# Patient Record
Sex: FEMALE | Race: BLACK OR AFRICAN AMERICAN | Employment: UNEMPLOYED | URBAN - METROPOLITAN AREA
[De-identification: names, ages, dates, MRNs, and addresses within clinical notes are randomized per-mention and may not be internally consistent; named-entity substitution may affect disease eponyms.]

---

## 2017-01-01 ENCOUNTER — APPOINTMENT (OUTPATIENT)
Dept: GENERAL RADIOLOGY | Age: 67
DRG: 166 | End: 2017-01-01
Attending: INTERNAL MEDICINE
Payer: MEDICARE

## 2017-01-01 ENCOUNTER — APPOINTMENT (OUTPATIENT)
Dept: GENERAL RADIOLOGY | Age: 67
DRG: 166 | End: 2017-01-01
Attending: HOSPITALIST
Payer: MEDICARE

## 2017-01-01 ENCOUNTER — APPOINTMENT (OUTPATIENT)
Dept: CT IMAGING | Age: 67
DRG: 166 | End: 2017-01-01
Attending: PHYSICIAN ASSISTANT
Payer: MEDICARE

## 2017-01-01 ENCOUNTER — APPOINTMENT (OUTPATIENT)
Dept: CT IMAGING | Age: 67
DRG: 166 | End: 2017-01-01
Attending: INTERNAL MEDICINE
Payer: MEDICARE

## 2017-01-01 ENCOUNTER — APPOINTMENT (OUTPATIENT)
Dept: GENERAL RADIOLOGY | Age: 67
DRG: 166 | End: 2017-01-01
Attending: NURSE PRACTITIONER
Payer: MEDICARE

## 2017-01-01 ENCOUNTER — HOSPITAL ENCOUNTER (INPATIENT)
Age: 67
LOS: 1 days | Discharge: HOME | DRG: 252 | End: 2017-09-22
Attending: EMERGENCY MEDICINE | Admitting: INTERNAL MEDICINE
Payer: MEDICARE

## 2017-01-01 ENCOUNTER — APPOINTMENT (OUTPATIENT)
Dept: GENERAL RADIOLOGY | Age: 67
DRG: 166 | End: 2017-01-01
Attending: PHYSICIAN ASSISTANT
Payer: MEDICARE

## 2017-01-01 ENCOUNTER — HOSPITAL ENCOUNTER (INPATIENT)
Age: 67
LOS: 7 days | DRG: 166 | End: 2017-10-28
Attending: EMERGENCY MEDICINE | Admitting: HOSPITALIST
Payer: MEDICARE

## 2017-01-01 ENCOUNTER — APPOINTMENT (OUTPATIENT)
Dept: CT IMAGING | Age: 67
DRG: 166 | End: 2017-01-01
Attending: EMERGENCY MEDICINE
Payer: MEDICARE

## 2017-01-01 ENCOUNTER — APPOINTMENT (OUTPATIENT)
Dept: GENERAL RADIOLOGY | Age: 67
DRG: 252 | End: 2017-01-01
Attending: SURGERY
Payer: MEDICARE

## 2017-01-01 ENCOUNTER — APPOINTMENT (OUTPATIENT)
Dept: GENERAL RADIOLOGY | Age: 67
DRG: 166 | End: 2017-01-01
Attending: EMERGENCY MEDICINE
Payer: MEDICARE

## 2017-01-01 ENCOUNTER — OFFICE VISIT (OUTPATIENT)
Dept: CARDIOLOGY CLINIC | Age: 67
End: 2017-01-01

## 2017-01-01 ENCOUNTER — ANESTHESIA EVENT (OUTPATIENT)
Dept: CARDIOTHORACIC SURGERY | Age: 67
DRG: 252 | End: 2017-01-01
Payer: MEDICARE

## 2017-01-01 ENCOUNTER — ANESTHESIA (OUTPATIENT)
Dept: CARDIOTHORACIC SURGERY | Age: 67
DRG: 252 | End: 2017-01-01
Payer: MEDICARE

## 2017-01-01 VITALS
WEIGHT: 168 LBS | DIASTOLIC BLOOD PRESSURE: 70 MMHG | HEIGHT: 67 IN | SYSTOLIC BLOOD PRESSURE: 140 MMHG | BODY MASS INDEX: 26.37 KG/M2 | HEART RATE: 82 BPM

## 2017-01-01 VITALS
HEART RATE: 106 BPM | WEIGHT: 182.98 LBS | RESPIRATION RATE: 21 BRPM | BODY MASS INDEX: 30.49 KG/M2 | TEMPERATURE: 99.6 F | DIASTOLIC BLOOD PRESSURE: 63 MMHG | SYSTOLIC BLOOD PRESSURE: 91 MMHG | HEIGHT: 65 IN | OXYGEN SATURATION: 100 %

## 2017-01-01 VITALS
WEIGHT: 176.15 LBS | BODY MASS INDEX: 28.31 KG/M2 | RESPIRATION RATE: 16 BRPM | TEMPERATURE: 97.7 F | OXYGEN SATURATION: 96 % | HEART RATE: 61 BPM | DIASTOLIC BLOOD PRESSURE: 52 MMHG | HEIGHT: 66 IN | SYSTOLIC BLOOD PRESSURE: 103 MMHG

## 2017-01-01 DIAGNOSIS — I11.9 HYPERTENSIVE HEART DISEASE WITHOUT HEART FAILURE: Primary | ICD-10-CM

## 2017-01-01 DIAGNOSIS — I44.30 AV BLOCK: ICD-10-CM

## 2017-01-01 DIAGNOSIS — N18.6: ICD-10-CM

## 2017-01-01 DIAGNOSIS — N18.6 ESRD ON DIALYSIS (HCC): ICD-10-CM

## 2017-01-01 DIAGNOSIS — J90 PLEURAL EFFUSION: ICD-10-CM

## 2017-01-01 DIAGNOSIS — R06.00 DYSPNEA, UNSPECIFIED TYPE: Primary | ICD-10-CM

## 2017-01-01 DIAGNOSIS — Z95.0 CARDIAC PACEMAKER IN SITU: ICD-10-CM

## 2017-01-01 DIAGNOSIS — Z99.2 ESRD ON DIALYSIS (HCC): ICD-10-CM

## 2017-01-01 DIAGNOSIS — I10 ESSENTIAL HYPERTENSION: ICD-10-CM

## 2017-01-01 DIAGNOSIS — T82.49XA CLOTTED DIALYSIS ACCESS, INITIAL ENCOUNTER (HCC): Primary | ICD-10-CM

## 2017-01-01 LAB
ACID FAST STN SPEC: NEGATIVE
ALBUMIN SERPL-MCNC: 2 G/DL (ref 3.4–5)
ALBUMIN SERPL-MCNC: 2.7 G/DL (ref 3.4–5)
ALBUMIN SERPL-MCNC: 3.2 G/DL (ref 3.4–5)
ALBUMIN SERPL-MCNC: 3.2 G/DL (ref 3.4–5)
ALBUMIN SERPL-MCNC: 3.4 G/DL (ref 3.4–5)
ALBUMIN/GLOB SERPL: 0.5 {RATIO} (ref 0.8–1.7)
ALBUMIN/GLOB SERPL: 0.7 {RATIO} (ref 0.8–1.7)
ALBUMIN/GLOB SERPL: 0.7 {RATIO} (ref 0.8–1.7)
ALBUMIN/GLOB SERPL: 0.8 {RATIO} (ref 0.8–1.7)
ALBUMIN/GLOB SERPL: 0.8 {RATIO} (ref 0.8–1.7)
ALP SERPL-CCNC: 142 U/L (ref 45–117)
ALP SERPL-CCNC: 173 U/L (ref 45–117)
ALP SERPL-CCNC: 178 U/L (ref 45–117)
ALP SERPL-CCNC: 206 U/L (ref 45–117)
ALP SERPL-CCNC: 214 U/L (ref 45–117)
ALT SERPL-CCNC: 10 U/L (ref 13–56)
ALT SERPL-CCNC: 11 U/L (ref 13–56)
ALT SERPL-CCNC: 15 U/L (ref 13–56)
ALT SERPL-CCNC: 9 U/L (ref 13–56)
ALT SERPL-CCNC: 9 U/L (ref 13–56)
ANION GAP SERPL CALC-SCNC: 12 MMOL/L (ref 3–18)
ANION GAP SERPL CALC-SCNC: 13 MMOL/L (ref 3–18)
ANION GAP SERPL CALC-SCNC: 5 MMOL/L (ref 3–18)
ANION GAP SERPL CALC-SCNC: 6 MMOL/L (ref 3–18)
ANION GAP SERPL CALC-SCNC: 7 MMOL/L (ref 3–18)
ANION GAP SERPL CALC-SCNC: 7 MMOL/L (ref 3–18)
ANION GAP SERPL CALC-SCNC: 8 MMOL/L (ref 3–18)
ANION GAP SERPL CALC-SCNC: 8 MMOL/L (ref 3–18)
ANION GAP SERPL CALC-SCNC: 9 MMOL/L (ref 3–18)
APPEARANCE FLD: ABNORMAL
APPEARANCE FLD: ABNORMAL
APTT PPP: 33 SEC (ref 23–36.4)
APTT PPP: 36.4 SEC (ref 23–36.4)
ARTERIAL PATENCY WRIST A: ABNORMAL
ARTERIAL PATENCY WRIST A: YES
AST SERPL-CCNC: 11 U/L (ref 15–37)
AST SERPL-CCNC: 16 U/L (ref 15–37)
AST SERPL-CCNC: 20 U/L (ref 15–37)
AST SERPL-CCNC: 7 U/L (ref 15–37)
AST SERPL-CCNC: 7 U/L (ref 15–37)
ATRIAL RATE: 76 BPM
ATRIAL RATE: 87 BPM
BACTERIA SPEC CULT: NORMAL
BASE DEFICIT BLD-SCNC: 10 MMOL/L
BASE DEFICIT BLD-SCNC: 2 MMOL/L
BASE DEFICIT BLD-SCNC: 3 MMOL/L
BASE DEFICIT BLD-SCNC: 4 MMOL/L
BASE DEFICIT BLD-SCNC: 4 MMOL/L
BASE DEFICIT BLD-SCNC: 6 MMOL/L
BASE DEFICIT BLD-SCNC: 6 MMOL/L
BASE DEFICIT BLD-SCNC: 7 MMOL/L
BASE DEFICIT BLD-SCNC: 8 MMOL/L
BASE DEFICIT BLD-SCNC: 9 MMOL/L
BASOPHILS # BLD: 0 K/UL (ref 0–0.06)
BASOPHILS # BLD: 0 K/UL (ref 0–0.1)
BASOPHILS NFR BLD: 0 % (ref 0–2)
BASOPHILS NFR BLD: 0 % (ref 0–3)
BASOPHILS NFR BLD: 0 % (ref 0–3)
BDY SITE: ABNORMAL
BILIRUB DIRECT SERPL-MCNC: 0.1 MG/DL (ref 0–0.2)
BILIRUB SERPL-MCNC: 0.3 MG/DL (ref 0.2–1)
BILIRUB SERPL-MCNC: 0.3 MG/DL (ref 0.2–1)
BILIRUB SERPL-MCNC: 0.4 MG/DL (ref 0.2–1)
BILIRUB SERPL-MCNC: 0.4 MG/DL (ref 0.2–1)
BILIRUB SERPL-MCNC: 0.5 MG/DL (ref 0.2–1)
BODY TEMPERATURE: 37
BODY TEMPERATURE: 98.6
BUN SERPL-MCNC: 16 MG/DL (ref 7–18)
BUN SERPL-MCNC: 22 MG/DL (ref 7–18)
BUN SERPL-MCNC: 25 MG/DL (ref 7–18)
BUN SERPL-MCNC: 26 MG/DL (ref 7–18)
BUN SERPL-MCNC: 30 MG/DL (ref 7–18)
BUN SERPL-MCNC: 39 MG/DL (ref 7–18)
BUN SERPL-MCNC: 40 MG/DL (ref 7–18)
BUN SERPL-MCNC: 43 MG/DL (ref 7–18)
BUN SERPL-MCNC: 70 MG/DL (ref 7–18)
BUN/CREAT SERPL: 5 (ref 12–20)
BUN/CREAT SERPL: 6 (ref 12–20)
BUN/CREAT SERPL: 7 (ref 12–20)
BUN/CREAT SERPL: 7 (ref 12–20)
BUN/CREAT SERPL: 8 (ref 12–20)
BUN/CREAT SERPL: 8 (ref 12–20)
BUN/CREAT SERPL: 9 (ref 12–20)
CALCIUM SERPL-MCNC: 8 MG/DL (ref 8.5–10.1)
CALCIUM SERPL-MCNC: 8 MG/DL (ref 8.5–10.1)
CALCIUM SERPL-MCNC: 8.1 MG/DL (ref 8.5–10.1)
CALCIUM SERPL-MCNC: 8.2 MG/DL (ref 8.5–10.1)
CALCIUM SERPL-MCNC: 8.3 MG/DL (ref 8.5–10.1)
CALCIUM SERPL-MCNC: 8.4 MG/DL (ref 8.5–10.1)
CALCIUM SERPL-MCNC: 8.4 MG/DL (ref 8.5–10.1)
CALCIUM SERPL-MCNC: 8.5 MG/DL (ref 8.5–10.1)
CALCIUM SERPL-MCNC: 8.6 MG/DL (ref 8.5–10.1)
CALCULATED R AXIS, ECG10: -122 DEGREES
CALCULATED R AXIS, ECG10: -86 DEGREES
CALCULATED T AXIS, ECG11: -2 DEGREES
CALCULATED T AXIS, ECG11: 94 DEGREES
CHLORIDE SERPL-SCNC: 101 MMOL/L (ref 100–108)
CHLORIDE SERPL-SCNC: 104 MMOL/L (ref 100–108)
CHLORIDE SERPL-SCNC: 107 MMOL/L (ref 100–108)
CHLORIDE SERPL-SCNC: 108 MMOL/L (ref 100–108)
CHLORIDE SERPL-SCNC: 108 MMOL/L (ref 100–108)
CHLORIDE SERPL-SCNC: 110 MMOL/L (ref 100–108)
CK MB CFR SERPL CALC: 1.8 % (ref 0–4)
CK MB CFR SERPL CALC: 10.4 % (ref 0–4)
CK MB CFR SERPL CALC: 8.3 % (ref 0–4)
CK MB SERPL-MCNC: 2.9 NG/ML (ref 5–25)
CK MB SERPL-MCNC: 3.4 NG/ML (ref 5–25)
CK MB SERPL-MCNC: 9 NG/ML (ref 5–25)
CK SERPL-CCNC: 28 U/L (ref 26–192)
CK SERPL-CCNC: 41 U/L (ref 26–192)
CK SERPL-CCNC: 502 U/L (ref 26–192)
CO2 SERPL-SCNC: 16 MMOL/L (ref 21–32)
CO2 SERPL-SCNC: 18 MMOL/L (ref 21–32)
CO2 SERPL-SCNC: 20 MMOL/L (ref 21–32)
CO2 SERPL-SCNC: 22 MMOL/L (ref 21–32)
CO2 SERPL-SCNC: 22 MMOL/L (ref 21–32)
CO2 SERPL-SCNC: 23 MMOL/L (ref 21–32)
CO2 SERPL-SCNC: 25 MMOL/L (ref 21–32)
CO2 SERPL-SCNC: 25 MMOL/L (ref 21–32)
CO2 SERPL-SCNC: 27 MMOL/L (ref 21–32)
COLOR FLD: ABNORMAL
COLOR FLD: YELLOW
CORTIS SERPL-MCNC: 22.7 UG/DL (ref 3.09–22.4)
CREAT SERPL-MCNC: 2.92 MG/DL (ref 0.6–1.3)
CREAT SERPL-MCNC: 3.76 MG/DL (ref 0.6–1.3)
CREAT SERPL-MCNC: 3.99 MG/DL (ref 0.6–1.3)
CREAT SERPL-MCNC: 4.18 MG/DL (ref 0.6–1.3)
CREAT SERPL-MCNC: 4.71 MG/DL (ref 0.6–1.3)
CREAT SERPL-MCNC: 4.79 MG/DL (ref 0.6–1.3)
CREAT SERPL-MCNC: 5.17 MG/DL (ref 0.6–1.3)
CREAT SERPL-MCNC: 6.06 MG/DL (ref 0.6–1.3)
CREAT SERPL-MCNC: 8.16 MG/DL (ref 0.6–1.3)
D DIMER PPP FEU-MCNC: 8.27 UG/ML(FEU)
DIAGNOSIS, 93000: NORMAL
DIAGNOSIS, 93000: NORMAL
DIFFERENTIAL METHOD BLD: ABNORMAL
EOSINOPHIL # BLD: 0 K/UL (ref 0–0.4)
EOSINOPHIL # BLD: 0.1 K/UL (ref 0–0.4)
EOSINOPHIL # BLD: 0.2 K/UL (ref 0–0.4)
EOSINOPHIL NFR BLD: 1 % (ref 0–5)
EOSINOPHIL NFR BLD: 2 % (ref 0–5)
EOSINOPHIL NFR FLD MANUAL: 0 %
EOSINOPHIL NFR FLD MANUAL: 0 %
ERYTHROCYTE [DISTWIDTH] IN BLOOD BY AUTOMATED COUNT: 13.5 % (ref 11.6–14.5)
ERYTHROCYTE [DISTWIDTH] IN BLOOD BY AUTOMATED COUNT: 13.8 % (ref 11.6–14.5)
ERYTHROCYTE [DISTWIDTH] IN BLOOD BY AUTOMATED COUNT: 13.8 % (ref 11.6–14.5)
ERYTHROCYTE [DISTWIDTH] IN BLOOD BY AUTOMATED COUNT: 13.9 % (ref 11.6–14.5)
ERYTHROCYTE [DISTWIDTH] IN BLOOD BY AUTOMATED COUNT: 13.9 % (ref 11.6–14.5)
ERYTHROCYTE [DISTWIDTH] IN BLOOD BY AUTOMATED COUNT: 14.2 % (ref 11.6–14.5)
ERYTHROCYTE [DISTWIDTH] IN BLOOD BY AUTOMATED COUNT: 15 % (ref 11.6–14.5)
ERYTHROCYTE [DISTWIDTH] IN BLOOD BY AUTOMATED COUNT: 16.5 % (ref 11.6–14.5)
ERYTHROCYTE [DISTWIDTH] IN BLOOD BY AUTOMATED COUNT: 16.6 % (ref 11.6–14.5)
GAS FLOW.O2 O2 DELIVERY SYS: ABNORMAL L/MIN
GAS FLOW.O2 SETTING OXYMISER: 15 BPM
GAS FLOW.O2 SETTING OXYMISER: 2.5 L/M
GAS FLOW.O2 SETTING OXYMISER: 20 BPM
GAS FLOW.O2 SETTING OXYMISER: 20 BPM
GAS FLOW.O2 SETTING OXYMISER: 22 BPM
GAS FLOW.O2 SETTING OXYMISER: 26 BPM
GAS FLOW.O2 SETTING OXYMISER: 26 BPM
GLOBULIN SER CALC-MCNC: 3.9 G/DL (ref 2–4)
GLOBULIN SER CALC-MCNC: 3.9 G/DL (ref 2–4)
GLOBULIN SER CALC-MCNC: 4 G/DL (ref 2–4)
GLOBULIN SER CALC-MCNC: 4.2 G/DL (ref 2–4)
GLOBULIN SER CALC-MCNC: 4.6 G/DL (ref 2–4)
GLUCOSE BLD STRIP.AUTO-MCNC: 100 MG/DL (ref 70–110)
GLUCOSE BLD STRIP.AUTO-MCNC: 102 MG/DL (ref 70–110)
GLUCOSE BLD STRIP.AUTO-MCNC: 103 MG/DL (ref 70–110)
GLUCOSE BLD STRIP.AUTO-MCNC: 55 MG/DL (ref 70–110)
GLUCOSE BLD STRIP.AUTO-MCNC: 61 MG/DL (ref 70–110)
GLUCOSE BLD STRIP.AUTO-MCNC: 69 MG/DL (ref 70–110)
GLUCOSE BLD STRIP.AUTO-MCNC: 76 MG/DL (ref 70–110)
GLUCOSE BLD STRIP.AUTO-MCNC: 81 MG/DL (ref 70–110)
GLUCOSE BLD STRIP.AUTO-MCNC: 82 MG/DL (ref 70–110)
GLUCOSE BLD STRIP.AUTO-MCNC: 84 MG/DL (ref 70–110)
GLUCOSE BLD STRIP.AUTO-MCNC: 85 MG/DL (ref 70–110)
GLUCOSE BLD STRIP.AUTO-MCNC: 86 MG/DL (ref 70–110)
GLUCOSE BLD STRIP.AUTO-MCNC: 86 MG/DL (ref 70–110)
GLUCOSE BLD STRIP.AUTO-MCNC: 87 MG/DL (ref 70–110)
GLUCOSE BLD STRIP.AUTO-MCNC: 87 MG/DL (ref 70–110)
GLUCOSE BLD STRIP.AUTO-MCNC: 88 MG/DL (ref 70–110)
GLUCOSE BLD STRIP.AUTO-MCNC: 88 MG/DL (ref 70–110)
GLUCOSE BLD STRIP.AUTO-MCNC: 90 MG/DL (ref 70–110)
GLUCOSE BLD STRIP.AUTO-MCNC: 90 MG/DL (ref 70–110)
GLUCOSE BLD STRIP.AUTO-MCNC: 91 MG/DL (ref 70–110)
GLUCOSE BLD STRIP.AUTO-MCNC: 91 MG/DL (ref 70–110)
GLUCOSE BLD STRIP.AUTO-MCNC: 92 MG/DL (ref 70–110)
GLUCOSE BLD STRIP.AUTO-MCNC: 93 MG/DL (ref 70–110)
GLUCOSE BLD STRIP.AUTO-MCNC: 93 MG/DL (ref 70–110)
GLUCOSE FLD-MCNC: 78 MG/DL
GLUCOSE FLD-MCNC: 89 MG/DL
GLUCOSE SERPL-MCNC: 107 MG/DL (ref 74–99)
GLUCOSE SERPL-MCNC: 117 MG/DL (ref 74–99)
GLUCOSE SERPL-MCNC: 73 MG/DL (ref 74–99)
GLUCOSE SERPL-MCNC: 75 MG/DL (ref 74–99)
GLUCOSE SERPL-MCNC: 76 MG/DL (ref 74–99)
GLUCOSE SERPL-MCNC: 82 MG/DL (ref 74–99)
GLUCOSE SERPL-MCNC: 84 MG/DL (ref 74–99)
GLUCOSE SERPL-MCNC: 89 MG/DL (ref 74–99)
GLUCOSE SERPL-MCNC: 90 MG/DL (ref 74–99)
GRAM STN SPEC: NORMAL
HBV SURFACE AB SER QL IA: NEGATIVE
HBV SURFACE AB SER QL IA: NEGATIVE
HBV SURFACE AB SERPL IA-ACNC: <3.1 MIU/ML
HBV SURFACE AB SERPL IA-ACNC: <3.1 MIU/ML
HBV SURFACE AG SER QL: <0.1 INDEX
HBV SURFACE AG SER QL: <0.1 INDEX
HBV SURFACE AG SER QL: NEGATIVE
HBV SURFACE AG SER QL: NEGATIVE
HCO3 BLD-SCNC: 17.6 MMOL/L (ref 22–26)
HCO3 BLD-SCNC: 17.9 MMOL/L (ref 22–26)
HCO3 BLD-SCNC: 18 MMOL/L (ref 22–26)
HCO3 BLD-SCNC: 18.9 MMOL/L (ref 22–26)
HCO3 BLD-SCNC: 21.4 MMOL/L (ref 22–26)
HCO3 BLD-SCNC: 21.4 MMOL/L (ref 22–26)
HCO3 BLD-SCNC: 21.7 MMOL/L (ref 22–26)
HCO3 BLD-SCNC: 22.7 MMOL/L (ref 22–26)
HCO3 BLD-SCNC: 23.9 MMOL/L (ref 22–26)
HCO3 BLD-SCNC: 24.4 MMOL/L (ref 22–26)
HCO3 BLD-SCNC: 24.8 MMOL/L (ref 22–26)
HCO3 BLD-SCNC: 26 MMOL/L (ref 22–26)
HCO3 BLD-SCNC: 26.7 MMOL/L (ref 22–26)
HCO3 BLD-SCNC: 27 MMOL/L (ref 22–26)
HCT VFR BLD AUTO: 26.8 % (ref 35–45)
HCT VFR BLD AUTO: 27.1 % (ref 35–45)
HCT VFR BLD AUTO: 27.3 % (ref 35–45)
HCT VFR BLD AUTO: 28.2 % (ref 35–45)
HCT VFR BLD AUTO: 29.8 % (ref 35–45)
HCT VFR BLD AUTO: 32.3 % (ref 35–45)
HCT VFR BLD AUTO: 35.1 % (ref 35–45)
HCT VFR BLD AUTO: 35.7 % (ref 35–45)
HCT VFR BLD AUTO: 35.8 % (ref 35–45)
HEP BS AB COMMENT,HBSAC: ABNORMAL
HEP BS AB COMMENT,HBSAC: ABNORMAL
HGB BLD-MCNC: 10.4 G/DL (ref 12–16)
HGB BLD-MCNC: 10.5 G/DL (ref 12–16)
HGB BLD-MCNC: 11 G/DL (ref 12–16)
HGB BLD-MCNC: 11.1 G/DL (ref 12–16)
HGB BLD-MCNC: 8.8 G/DL (ref 12–16)
HGB BLD-MCNC: 8.9 G/DL (ref 12–16)
HGB BLD-MCNC: 9.3 G/DL (ref 12–16)
HGB BLD-MCNC: 9.3 G/DL (ref 12–16)
HGB BLD-MCNC: 9.7 G/DL (ref 12–16)
INR PPP: 1 (ref 0.8–1.2)
INR PPP: 1.1 (ref 0.8–1.2)
INR PPP: 1.1 (ref 0.8–1.2)
INSPIRATION.DURATION SETTING TIME VENT: 0.9 SEC
INSPIRATION.DURATION SETTING TIME VENT: 1.25 SEC
LACTATE BLD-SCNC: 0.5 MMOL/L (ref 0.4–2)
LDH FLD L TO P-CCNC: 265 U/L
LDH FLD L TO P-CCNC: 69 U/L
LDH SERPL L TO P-CCNC: 260 U/L (ref 81–234)
LYMPHOCYTES # BLD: 0.2 K/UL (ref 0.8–3.5)
LYMPHOCYTES # BLD: 0.5 K/UL (ref 0.8–3.5)
LYMPHOCYTES # BLD: 0.5 K/UL (ref 0.9–3.6)
LYMPHOCYTES # BLD: 0.6 K/UL (ref 0.9–3.6)
LYMPHOCYTES # BLD: 0.8 K/UL (ref 0.9–3.6)
LYMPHOCYTES # BLD: 0.8 K/UL (ref 0.9–3.6)
LYMPHOCYTES # BLD: 0.9 K/UL (ref 0.9–3.6)
LYMPHOCYTES # BLD: 1 K/UL (ref 0.9–3.6)
LYMPHOCYTES # BLD: 1.1 K/UL (ref 0.9–3.6)
LYMPHOCYTES NFR BLD: 10 % (ref 21–52)
LYMPHOCYTES NFR BLD: 12 % (ref 21–52)
LYMPHOCYTES NFR BLD: 15 % (ref 21–52)
LYMPHOCYTES NFR BLD: 16 % (ref 21–52)
LYMPHOCYTES NFR BLD: 18 % (ref 21–52)
LYMPHOCYTES NFR BLD: 2 % (ref 20–51)
LYMPHOCYTES NFR BLD: 6 % (ref 20–51)
LYMPHOCYTES NFR BLD: 6 % (ref 21–52)
LYMPHOCYTES NFR BLD: 6 % (ref 21–52)
LYMPHOCYTES NFR FLD: 73 %
LYMPHOCYTES NFR FLD: 80 %
MACROPHAGES NFR FLD: 18 %
MAGNESIUM SERPL-MCNC: 2 MG/DL (ref 1.6–2.6)
MAGNESIUM SERPL-MCNC: 2.1 MG/DL (ref 1.6–2.6)
MAGNESIUM SERPL-MCNC: 2.1 MG/DL (ref 1.6–2.6)
MCH RBC QN AUTO: 32.7 PG (ref 24–34)
MCH RBC QN AUTO: 32.8 PG (ref 24–34)
MCH RBC QN AUTO: 33 PG (ref 24–34)
MCH RBC QN AUTO: 33 PG (ref 24–34)
MCH RBC QN AUTO: 33.2 PG (ref 24–34)
MCH RBC QN AUTO: 33.3 PG (ref 24–34)
MCH RBC QN AUTO: 33.5 PG (ref 24–34)
MCH RBC QN AUTO: 33.8 PG (ref 24–34)
MCH RBC QN AUTO: 34 PG (ref 24–34)
MCHC RBC AUTO-ENTMCNC: 29.1 G/DL (ref 31–37)
MCHC RBC AUTO-ENTMCNC: 31.1 G/DL (ref 31–37)
MCHC RBC AUTO-ENTMCNC: 31.3 G/DL (ref 31–37)
MCHC RBC AUTO-ENTMCNC: 32.5 G/DL (ref 31–37)
MCHC RBC AUTO-ENTMCNC: 32.5 G/DL (ref 31–37)
MCHC RBC AUTO-ENTMCNC: 32.6 G/DL (ref 31–37)
MCHC RBC AUTO-ENTMCNC: 33 G/DL (ref 31–37)
MCHC RBC AUTO-ENTMCNC: 33.2 G/DL (ref 31–37)
MCHC RBC AUTO-ENTMCNC: 34.1 G/DL (ref 31–37)
MCV RBC AUTO: 100 FL (ref 74–97)
MCV RBC AUTO: 100.3 FL (ref 74–97)
MCV RBC AUTO: 101.1 FL (ref 74–97)
MCV RBC AUTO: 103.9 FL (ref 74–97)
MCV RBC AUTO: 106.9 FL (ref 74–97)
MCV RBC AUTO: 108.3 FL (ref 74–97)
MCV RBC AUTO: 114.7 FL (ref 74–97)
MCV RBC AUTO: 98.2 FL (ref 74–97)
MCV RBC AUTO: 99.3 FL (ref 74–97)
MONOCYTES # BLD: 0.3 K/UL (ref 0.05–1.2)
MONOCYTES # BLD: 0.4 K/UL (ref 0.05–1.2)
MONOCYTES # BLD: 0.4 K/UL (ref 0.05–1.2)
MONOCYTES # BLD: 0.6 K/UL (ref 0.05–1.2)
MONOCYTES # BLD: 0.6 K/UL (ref 0–1)
MONOCYTES # BLD: 0.7 K/UL (ref 0–1)
MONOCYTES # BLD: 0.8 K/UL (ref 0.05–1.2)
MONOCYTES # BLD: 0.9 K/UL (ref 0.05–1.2)
MONOCYTES # BLD: 1 K/UL (ref 0.05–1.2)
MONOCYTES NFR BLD: 10 % (ref 3–10)
MONOCYTES NFR BLD: 13 % (ref 3–10)
MONOCYTES NFR BLD: 6 % (ref 3–10)
MONOCYTES NFR BLD: 7 % (ref 2–9)
MONOCYTES NFR BLD: 7 % (ref 2–9)
MONOCYTES NFR BLD: 7 % (ref 3–10)
MONOCYTES NFR BLD: 8 % (ref 3–10)
MONOCYTES NFR FLD: 0 %
MONOCYTES NFR FLD: 6 %
NEUTROPHILS NFR FLD: 14 %
NEUTROPHILS NFR FLD: 9 %
NEUTS BAND # FLD: 0 %
NEUTS BAND # FLD: 0 %
NEUTS BAND NFR BLD MANUAL: 5 % (ref 0–5)
NEUTS SEG # BLD: 3.7 K/UL (ref 1.8–8)
NEUTS SEG # BLD: 3.9 K/UL (ref 1.8–8)
NEUTS SEG # BLD: 5.3 K/UL (ref 1.8–8)
NEUTS SEG # BLD: 6.1 K/UL (ref 1.8–8)
NEUTS SEG # BLD: 6.4 K/UL (ref 1.8–8)
NEUTS SEG # BLD: 7 K/UL (ref 1.8–8)
NEUTS SEG # BLD: 7 K/UL (ref 1.8–8)
NEUTS SEG # BLD: 7.7 K/UL (ref 1.8–8)
NEUTS SEG # BLD: 9.3 K/UL (ref 1.8–8)
NEUTS SEG NFR BLD: 73 % (ref 40–73)
NEUTS SEG NFR BLD: 75 % (ref 40–73)
NEUTS SEG NFR BLD: 76 % (ref 40–73)
NEUTS SEG NFR BLD: 76 % (ref 40–73)
NEUTS SEG NFR BLD: 77 % (ref 40–73)
NEUTS SEG NFR BLD: 84 % (ref 42–75)
NEUTS SEG NFR BLD: 85 % (ref 40–73)
NEUTS SEG NFR BLD: 86 % (ref 40–73)
NEUTS SEG NFR BLD: 86 % (ref 42–75)
NUC CELL # FLD: 147 /CU MM
NUC CELL # FLD: 213 /CU MM
O2/TOTAL GAS SETTING VFR VENT: 0.3 %
O2/TOTAL GAS SETTING VFR VENT: 0.4 %
O2/TOTAL GAS SETTING VFR VENT: 100 %
O2/TOTAL GAS SETTING VFR VENT: 30 %
O2/TOTAL GAS SETTING VFR VENT: 30 %
O2/TOTAL GAS SETTING VFR VENT: 35 %
O2/TOTAL GAS SETTING VFR VENT: 40 %
O2/TOTAL GAS SETTING VFR VENT: 40 %
O2/TOTAL GAS SETTING VFR VENT: 50 %
O2/TOTAL GAS SETTING VFR VENT: 50 %
O2/TOTAL GAS SETTING VFR VENT: 60 %
P-R INTERVAL, ECG05: 64 MS
P-R INTERVAL, ECG05: 82 MS
PCO2 BLD: 100.2 MMHG (ref 35–45)
PCO2 BLD: 100.5 MMHG (ref 35–45)
PCO2 BLD: 116.1 MMHG (ref 35–45)
PCO2 BLD: 30.2 MMHG (ref 35–45)
PCO2 BLD: 34.2 MMHG (ref 35–45)
PCO2 BLD: 34.3 MMHG (ref 35–45)
PCO2 BLD: 35.7 MMHG (ref 35–45)
PCO2 BLD: 37.4 MMHG (ref 35–45)
PCO2 BLD: 38.2 MMHG (ref 35–45)
PCO2 BLD: 38.6 MMHG (ref 35–45)
PCO2 BLD: 45.3 MMHG (ref 35–45)
PCO2 BLD: 81.9 MMHG (ref 35–45)
PCO2 BLD: 84 MMHG (ref 35–45)
PCO2 BLD: 98.7 MMHG (ref 35–45)
PEEP RESPIRATORY: 10 CMH2O
PEEP RESPIRATORY: 5 CMH2O
PH BLD: 6.94 [PH] (ref 7.35–7.45)
PH BLD: 7.03 [PH] (ref 7.35–7.45)
PH BLD: 7.03 [PH] (ref 7.35–7.45)
PH BLD: 7.04 [PH] (ref 7.35–7.45)
PH BLD: 7.07 [PH] (ref 7.35–7.45)
PH BLD: 7.07 [PH] (ref 7.35–7.45)
PH BLD: 7.23 [PH] (ref 7.35–7.45)
PH BLD: 7.31 [PH] (ref 7.35–7.45)
PH BLD: 7.33 [PH] (ref 7.35–7.45)
PH BLD: 7.36 [PH] (ref 7.35–7.45)
PH BLD: 7.37 [PH] (ref 7.35–7.45)
PH BLD: 7.37 [PH] (ref 7.35–7.45)
PH BLD: 7.38 [PH] (ref 7.35–7.45)
PH BLD: 7.41 [PH] (ref 7.35–7.45)
PHOSPHATE SERPL-MCNC: 1.6 MG/DL (ref 2.5–4.9)
PHOSPHATE SERPL-MCNC: 1.7 MG/DL (ref 2.5–4.9)
PHOSPHATE SERPL-MCNC: 2.2 MG/DL (ref 2.5–4.9)
PHOSPHATE SERPL-MCNC: 2.4 MG/DL (ref 2.5–4.9)
PIP ISTAT,IPIP: 16
PIP ISTAT,IPIP: 16
PIP ISTAT,IPIP: 20
PIP ISTAT,IPIP: 22
PIP ISTAT,IPIP: 22
PIP ISTAT,IPIP: 30
PLATELET # BLD AUTO: 105 K/UL (ref 135–420)
PLATELET # BLD AUTO: 131 K/UL (ref 135–420)
PLATELET # BLD AUTO: 145 K/UL (ref 135–420)
PLATELET # BLD AUTO: 151 K/UL (ref 135–420)
PLATELET # BLD AUTO: 158 K/UL (ref 135–420)
PLATELET # BLD AUTO: 162 K/UL (ref 135–420)
PLATELET # BLD AUTO: 164 K/UL (ref 135–420)
PLATELET # BLD AUTO: 169 K/UL (ref 135–420)
PLATELET # BLD AUTO: 183 K/UL (ref 135–420)
PLATELET COMMENTS,PCOM: ABNORMAL
PLATELET COMMENTS,PCOM: ABNORMAL
PMV BLD AUTO: 10.2 FL (ref 9.2–11.8)
PMV BLD AUTO: 10.4 FL (ref 9.2–11.8)
PMV BLD AUTO: 10.4 FL (ref 9.2–11.8)
PMV BLD AUTO: 10.6 FL (ref 9.2–11.8)
PMV BLD AUTO: 10.7 FL (ref 9.2–11.8)
PMV BLD AUTO: 10.9 FL (ref 9.2–11.8)
PMV BLD AUTO: 12 FL (ref 9.2–11.8)
PMV BLD AUTO: 9.7 FL (ref 9.2–11.8)
PMV BLD AUTO: 9.9 FL (ref 9.2–11.8)
PO2 BLD: 109 MMHG (ref 80–100)
PO2 BLD: 115 MMHG (ref 80–100)
PO2 BLD: 116 MMHG (ref 80–100)
PO2 BLD: 116 MMHG (ref 80–100)
PO2 BLD: 120 MMHG (ref 80–100)
PO2 BLD: 126 MMHG (ref 80–100)
PO2 BLD: 142 MMHG (ref 80–100)
PO2 BLD: 172 MMHG (ref 80–100)
PO2 BLD: 205 MMHG (ref 80–100)
PO2 BLD: 316 MMHG (ref 80–100)
PO2 BLD: 74 MMHG (ref 80–100)
PO2 BLD: 75 MMHG (ref 80–100)
PO2 BLD: 79 MMHG (ref 80–100)
PO2 BLD: 97 MMHG (ref 80–100)
POTASSIUM SERPL-SCNC: 2.7 MMOL/L (ref 3.5–5.5)
POTASSIUM SERPL-SCNC: 3.2 MMOL/L (ref 3.5–5.5)
POTASSIUM SERPL-SCNC: 3.3 MMOL/L (ref 3.5–5.5)
POTASSIUM SERPL-SCNC: 3.6 MMOL/L (ref 3.5–5.5)
POTASSIUM SERPL-SCNC: 3.6 MMOL/L (ref 3.5–5.5)
POTASSIUM SERPL-SCNC: 3.7 MMOL/L (ref 3.5–5.5)
POTASSIUM SERPL-SCNC: 3.7 MMOL/L (ref 3.5–5.5)
POTASSIUM SERPL-SCNC: 3.8 MMOL/L (ref 3.5–5.5)
POTASSIUM SERPL-SCNC: 4.5 MMOL/L (ref 3.5–5.5)
PROT FLD-MCNC: 4 G/DL
PROT FLD-MCNC: 4.7 G/DL
PROT SERPL-MCNC: 5.9 G/DL (ref 6.4–8.2)
PROT SERPL-MCNC: 6.6 G/DL (ref 6.4–8.2)
PROT SERPL-MCNC: 7.2 G/DL (ref 6.4–8.2)
PROT SERPL-MCNC: 7.4 G/DL (ref 6.4–8.2)
PROT SERPL-MCNC: 8 G/DL (ref 6.4–8.2)
PROTHROMBIN TIME: 13.1 SEC (ref 11.5–15.2)
PROTHROMBIN TIME: 13.7 SEC (ref 11.5–15.2)
PROTHROMBIN TIME: 13.9 SEC (ref 11.5–15.2)
Q-T INTERVAL, ECG07: 440 MS
Q-T INTERVAL, ECG07: 480 MS
QRS DURATION, ECG06: 140 MS
QRS DURATION, ECG06: 162 MS
QTC CALCULATION (BEZET), ECG08: 484 MS
QTC CALCULATION (BEZET), ECG08: 540 MS
RBC # BLD AUTO: 2.68 M/UL (ref 4.2–5.3)
RBC # BLD AUTO: 2.7 M/UL (ref 4.2–5.3)
RBC # BLD AUTO: 2.78 M/UL (ref 4.2–5.3)
RBC # BLD AUTO: 2.82 M/UL (ref 4.2–5.3)
RBC # BLD AUTO: 2.97 M/UL (ref 4.2–5.3)
RBC # BLD AUTO: 3.11 M/UL (ref 4.2–5.3)
RBC # BLD AUTO: 3.12 M/UL (ref 4.2–5.3)
RBC # BLD AUTO: 3.24 M/UL (ref 4.2–5.3)
RBC # BLD AUTO: 3.34 M/UL (ref 4.2–5.3)
RBC # FLD: 8738 /CU MM
RBC # FLD: ABNORMAL /CU MM
RBC MORPH BLD: ABNORMAL
SAO2 % BLD: 100 % (ref 92–97)
SAO2 % BLD: 100 % (ref 92–97)
SAO2 % BLD: 84 % (ref 92–97)
SAO2 % BLD: 85 % (ref 92–97)
SAO2 % BLD: 86 % (ref 92–97)
SAO2 % BLD: 95 % (ref 92–97)
SAO2 % BLD: 95 % (ref 92–97)
SAO2 % BLD: 96 % (ref 92–97)
SAO2 % BLD: 97 % (ref 92–97)
SAO2 % BLD: 98 % (ref 92–97)
SAO2 % BLD: 98 % (ref 92–97)
SAO2 % BLD: 99 % (ref 92–97)
SERVICE CMNT-IMP: ABNORMAL
SERVICE CMNT-IMP: NORMAL
SODIUM SERPL-SCNC: 133 MMOL/L (ref 136–145)
SODIUM SERPL-SCNC: 135 MMOL/L (ref 136–145)
SODIUM SERPL-SCNC: 136 MMOL/L (ref 136–145)
SODIUM SERPL-SCNC: 136 MMOL/L (ref 136–145)
SODIUM SERPL-SCNC: 137 MMOL/L (ref 136–145)
SODIUM SERPL-SCNC: 140 MMOL/L (ref 136–145)
SODIUM SERPL-SCNC: 141 MMOL/L (ref 136–145)
SPECIMEN PREPARATION: NORMAL
SPECIMEN SOURCE FLD: ABNORMAL
SPECIMEN SOURCE FLD: ABNORMAL
SPECIMEN SOURCE FLD: NORMAL
SPECIMEN SOURCE: NORMAL
SPECIMEN TYPE: ABNORMAL
SPONTANEOUS TIMED, IST: YES
TOTAL RESP. RATE, ITRR: 12
TOTAL RESP. RATE, ITRR: 14
TOTAL RESP. RATE, ITRR: 15
TOTAL RESP. RATE, ITRR: 16
TOTAL RESP. RATE, ITRR: 16
TOTAL RESP. RATE, ITRR: 18
TOTAL RESP. RATE, ITRR: 20
TOTAL RESP. RATE, ITRR: 20
TOTAL RESP. RATE, ITRR: 22
TOTAL RESP. RATE, ITRR: 23
TOTAL RESP. RATE, ITRR: 24
TOTAL RESP. RATE, ITRR: 26
TOTAL RESP. RATE, ITRR: 26
TOTAL RESP. RATE, ITRR: 41
TROPONIN I SERPL-MCNC: 0.1 NG/ML (ref 0–0.04)
TROPONIN I SERPL-MCNC: 0.11 NG/ML (ref 0–0.04)
TROPONIN I SERPL-MCNC: 0.12 NG/ML (ref 0–0.04)
VANCOMYCIN SERPL-MCNC: 21.3 UG/ML (ref 5–40)
VANCOMYCIN SERPL-MCNC: 22.2 UG/ML (ref 5–40)
VANCOMYCIN SERPL-MCNC: 23.5 UG/ML (ref 5–40)
VENTILATION MODE VENT: ABNORMAL
VENTRICULAR RATE, ECG03: 73 BPM
VENTRICULAR RATE, ECG03: 76 BPM
VOLUME CONTROL IVLC: YES
VOLUME CONTROL IVLC: YES
VOLUME CONTROL PLUS IVLCP: YES
VT SETTING VENT: 350 ML
VT SETTING VENT: 375 ML
WBC # BLD AUTO: 10.4 K/UL (ref 4.6–13.2)
WBC # BLD AUTO: 5.1 K/UL (ref 4.6–13.2)
WBC # BLD AUTO: 5.1 K/UL (ref 4.6–13.2)
WBC # BLD AUTO: 6.9 K/UL (ref 4.6–13.2)
WBC # BLD AUTO: 8.1 K/UL (ref 4.6–13.2)
WBC # BLD AUTO: 8.2 K/UL (ref 4.6–13.2)
WBC # BLD AUTO: 8.2 K/UL (ref 4.6–13.2)
WBC # BLD AUTO: 8.4 K/UL (ref 4.6–13.2)
WBC # BLD AUTO: 9.1 K/UL (ref 4.6–13.2)
WBC MORPH BLD: ABNORMAL

## 2017-01-01 PROCEDURE — 74011250636 HC RX REV CODE- 250/636: Performed by: EMERGENCY MEDICINE

## 2017-01-01 PROCEDURE — 71010 XR CHEST PORT: CPT

## 2017-01-01 PROCEDURE — 99284 EMERGENCY DEPT VISIT MOD MDM: CPT

## 2017-01-01 PROCEDURE — 87340 HEPATITIS B SURFACE AG IA: CPT | Performed by: INTERNAL MEDICINE

## 2017-01-01 PROCEDURE — 74011250637 HC RX REV CODE- 250/637: Performed by: INTERNAL MEDICINE

## 2017-01-01 PROCEDURE — 74011000258 HC RX REV CODE- 258: Performed by: INTERNAL MEDICINE

## 2017-01-01 PROCEDURE — 74011000250 HC RX REV CODE- 250: Performed by: PHYSICIAN ASSISTANT

## 2017-01-01 PROCEDURE — 82550 ASSAY OF CK (CPK): CPT | Performed by: HOSPITALIST

## 2017-01-01 PROCEDURE — 74011000258 HC RX REV CODE- 258: Performed by: PHYSICIAN ASSISTANT

## 2017-01-01 PROCEDURE — 88305 TISSUE EXAM BY PATHOLOGIST: CPT | Performed by: INTERNAL MEDICINE

## 2017-01-01 PROCEDURE — 84100 ASSAY OF PHOSPHORUS: CPT | Performed by: INTERNAL MEDICINE

## 2017-01-01 PROCEDURE — 74011250636 HC RX REV CODE- 250/636

## 2017-01-01 PROCEDURE — 74011000258 HC RX REV CODE- 258: Performed by: HOSPITALIST

## 2017-01-01 PROCEDURE — 82803 BLOOD GASES ANY COMBINATION: CPT

## 2017-01-01 PROCEDURE — 74011250636 HC RX REV CODE- 250/636: Performed by: INTERNAL MEDICINE

## 2017-01-01 PROCEDURE — 0BH17EZ INSERTION OF ENDOTRACHEAL AIRWAY INTO TRACHEA, VIA NATURAL OR ARTIFICIAL OPENING: ICD-10-PCS | Performed by: ANESTHESIOLOGY

## 2017-01-01 PROCEDURE — 83735 ASSAY OF MAGNESIUM: CPT

## 2017-01-01 PROCEDURE — 86431 RHEUMATOID FACTOR QUANT: CPT | Performed by: INTERNAL MEDICINE

## 2017-01-01 PROCEDURE — 80202 ASSAY OF VANCOMYCIN: CPT | Performed by: HOSPITALIST

## 2017-01-01 PROCEDURE — 93005 ELECTROCARDIOGRAM TRACING: CPT

## 2017-01-01 PROCEDURE — 94660 CPAP INITIATION&MGMT: CPT

## 2017-01-01 PROCEDURE — 86706 HEP B SURFACE ANTIBODY: CPT | Performed by: INTERNAL MEDICINE

## 2017-01-01 PROCEDURE — 94640 AIRWAY INHALATION TREATMENT: CPT

## 2017-01-01 PROCEDURE — 74011250636 HC RX REV CODE- 250/636: Performed by: NURSE PRACTITIONER

## 2017-01-01 PROCEDURE — 83615 LACTATE (LD) (LDH) ENZYME: CPT | Performed by: INTERNAL MEDICINE

## 2017-01-01 PROCEDURE — 99218 HC RM OBSERVATION: CPT

## 2017-01-01 PROCEDURE — 65270000029 HC RM PRIVATE

## 2017-01-01 PROCEDURE — 85025 COMPLETE CBC W/AUTO DIFF WBC: CPT | Performed by: NURSE PRACTITIONER

## 2017-01-01 PROCEDURE — 85025 COMPLETE CBC W/AUTO DIFF WBC: CPT | Performed by: INTERNAL MEDICINE

## 2017-01-01 PROCEDURE — 74011000258 HC RX REV CODE- 258: Performed by: SURGERY

## 2017-01-01 PROCEDURE — 36600 WITHDRAWAL OF ARTERIAL BLOOD: CPT

## 2017-01-01 PROCEDURE — 85610 PROTHROMBIN TIME: CPT | Performed by: EMERGENCY MEDICINE

## 2017-01-01 PROCEDURE — 74011000258 HC RX REV CODE- 258

## 2017-01-01 PROCEDURE — 82945 GLUCOSE OTHER FLUID: CPT | Performed by: INTERNAL MEDICINE

## 2017-01-01 PROCEDURE — 74011250636 HC RX REV CODE- 250/636: Performed by: HOSPITALIST

## 2017-01-01 PROCEDURE — 74011250637 HC RX REV CODE- 250/637: Performed by: NURSE PRACTITIONER

## 2017-01-01 PROCEDURE — 87206 SMEAR FLUORESCENT/ACID STAI: CPT | Performed by: INTERNAL MEDICINE

## 2017-01-01 PROCEDURE — 65610000006 HC RM INTENSIVE CARE

## 2017-01-01 PROCEDURE — 74011250636 HC RX REV CODE- 250/636: Performed by: PHYSICIAN ASSISTANT

## 2017-01-01 PROCEDURE — 5A1D00Z PERFORMANCE OF URINARY FILTRATION, SINGLE: ICD-10-PCS | Performed by: INTERNAL MEDICINE

## 2017-01-01 PROCEDURE — 74011250636 HC RX REV CODE- 250/636: Performed by: SURGERY

## 2017-01-01 PROCEDURE — 85025 COMPLETE CBC W/AUTO DIFF WBC: CPT | Performed by: EMERGENCY MEDICINE

## 2017-01-01 PROCEDURE — 51702 INSERT TEMP BLADDER CATH: CPT

## 2017-01-01 PROCEDURE — 77030002933 HC SUT MCRYL J&J -A: Performed by: SURGERY

## 2017-01-01 PROCEDURE — 77030018846 HC SOL IRR STRL H20 ICUM -A

## 2017-01-01 PROCEDURE — 87102 FUNGUS ISOLATION CULTURE: CPT | Performed by: INTERNAL MEDICINE

## 2017-01-01 PROCEDURE — 77030002996 HC SUT SLK J&J -A: Performed by: SURGERY

## 2017-01-01 PROCEDURE — 77030012390 HC DRN CHST BTL GTNG -B

## 2017-01-01 PROCEDURE — 80048 BASIC METABOLIC PNL TOTAL CA: CPT | Performed by: INTERNAL MEDICINE

## 2017-01-01 PROCEDURE — 77030018836 HC SOL IRR NACL ICUM -A: Performed by: SURGERY

## 2017-01-01 PROCEDURE — 83605 ASSAY OF LACTIC ACID: CPT

## 2017-01-01 PROCEDURE — 85730 THROMBOPLASTIN TIME PARTIAL: CPT | Performed by: EMERGENCY MEDICINE

## 2017-01-01 PROCEDURE — 85025 COMPLETE CBC W/AUTO DIFF WBC: CPT | Performed by: PHYSICIAN ASSISTANT

## 2017-01-01 PROCEDURE — 96374 THER/PROPH/DIAG INJ IV PUSH: CPT

## 2017-01-01 PROCEDURE — 74011000250 HC RX REV CODE- 250: Performed by: SURGERY

## 2017-01-01 PROCEDURE — 87070 CULTURE OTHR SPECIMN AEROBIC: CPT | Performed by: INTERNAL MEDICINE

## 2017-01-01 PROCEDURE — 71010 XR CHEST SNGL V: CPT

## 2017-01-01 PROCEDURE — 03783ZZ DILATION OF LEFT BRACHIAL ARTERY, PERCUTANEOUS APPROACH: ICD-10-PCS | Performed by: SURGERY

## 2017-01-01 PROCEDURE — 90935 HEMODIALYSIS ONE EVALUATION: CPT

## 2017-01-01 PROCEDURE — 85610 PROTHROMBIN TIME: CPT | Performed by: HOSPITALIST

## 2017-01-01 PROCEDURE — 94003 VENT MGMT INPAT SUBQ DAY: CPT

## 2017-01-01 PROCEDURE — 36415 COLL VENOUS BLD VENIPUNCTURE: CPT | Performed by: HOSPITALIST

## 2017-01-01 PROCEDURE — 93931 UPPER EXTREMITY STUDY: CPT

## 2017-01-01 PROCEDURE — 87641 MR-STAPH DNA AMP PROBE: CPT | Performed by: INTERNAL MEDICINE

## 2017-01-01 PROCEDURE — 74011000250 HC RX REV CODE- 250: Performed by: HOSPITALIST

## 2017-01-01 PROCEDURE — B51W1ZZ FLUOROSCOPY OF DIALYSIS SHUNT/FISTULA USING LOW OSMOLAR CONTRAST: ICD-10-PCS | Performed by: SURGERY

## 2017-01-01 PROCEDURE — 77030013515 HC DEV INFL ANGI BSC -B: Performed by: SURGERY

## 2017-01-01 PROCEDURE — 82962 GLUCOSE BLOOD TEST: CPT

## 2017-01-01 PROCEDURE — 36415 COLL VENOUS BLD VENIPUNCTURE: CPT | Performed by: INTERNAL MEDICINE

## 2017-01-01 PROCEDURE — C1769 GUIDE WIRE: HCPCS | Performed by: SURGERY

## 2017-01-01 PROCEDURE — 74011636320 HC RX REV CODE- 636/320: Performed by: EMERGENCY MEDICINE

## 2017-01-01 PROCEDURE — 96375 TX/PRO/DX INJ NEW DRUG ADDON: CPT

## 2017-01-01 PROCEDURE — 84157 ASSAY OF PROTEIN OTHER: CPT | Performed by: PHYSICIAN ASSISTANT

## 2017-01-01 PROCEDURE — 86431 RHEUMATOID FACTOR QUANT: CPT

## 2017-01-01 PROCEDURE — 75710 ARTERY X-RAYS ARM/LEG: CPT

## 2017-01-01 PROCEDURE — 5A1D70Z PERFORMANCE OF URINARY FILTRATION, INTERMITTENT, LESS THAN 6 HOURS PER DAY: ICD-10-PCS | Performed by: INTERNAL MEDICINE

## 2017-01-01 PROCEDURE — 0B9J8ZX DRAINAGE OF LEFT LOWER LUNG LOBE, VIA NATURAL OR ARTIFICIAL OPENING ENDOSCOPIC, DIAGNOSTIC: ICD-10-PCS | Performed by: INTERNAL MEDICINE

## 2017-01-01 PROCEDURE — P9047 ALBUMIN (HUMAN), 25%, 50ML: HCPCS | Performed by: INTERNAL MEDICINE

## 2017-01-01 PROCEDURE — C1729 CATH, DRAINAGE: HCPCS

## 2017-01-01 PROCEDURE — 80053 COMPREHEN METABOLIC PANEL: CPT | Performed by: EMERGENCY MEDICINE

## 2017-01-01 PROCEDURE — 93306 TTE W/DOPPLER COMPLETE: CPT

## 2017-01-01 PROCEDURE — 77030011640 HC PAD GRND REM COVD -A: Performed by: SURGERY

## 2017-01-01 PROCEDURE — 74011000250 HC RX REV CODE- 250: Performed by: EMERGENCY MEDICINE

## 2017-01-01 PROCEDURE — 83735 ASSAY OF MAGNESIUM: CPT | Performed by: PHYSICIAN ASSISTANT

## 2017-01-01 PROCEDURE — 36415 COLL VENOUS BLD VENIPUNCTURE: CPT | Performed by: NURSE PRACTITIONER

## 2017-01-01 PROCEDURE — 5A1955Z RESPIRATORY VENTILATION, GREATER THAN 96 CONSECUTIVE HOURS: ICD-10-PCS | Performed by: INTERNAL MEDICINE

## 2017-01-01 PROCEDURE — 77030037878 HC DRSG MEPILEX >48IN BORD MOLN -B: Performed by: SURGERY

## 2017-01-01 PROCEDURE — 89051 BODY FLUID CELL COUNT: CPT | Performed by: PHYSICIAN ASSISTANT

## 2017-01-01 PROCEDURE — 77030013033 HC MSK BPAP/CPAP MMKA -B

## 2017-01-01 PROCEDURE — 76010000112 HC CV SURG 0.5 TO 1 HR: Performed by: SURGERY

## 2017-01-01 PROCEDURE — 82533 TOTAL CORTISOL: CPT | Performed by: PHYSICIAN ASSISTANT

## 2017-01-01 PROCEDURE — 74011000250 HC RX REV CODE- 250: Performed by: NURSE PRACTITIONER

## 2017-01-01 PROCEDURE — 74011250636 HC RX REV CODE- 250/636: Performed by: NURSE ANESTHETIST, CERTIFIED REGISTERED

## 2017-01-01 PROCEDURE — 94002 VENT MGMT INPAT INIT DAY: CPT

## 2017-01-01 PROCEDURE — 80048 BASIC METABOLIC PNL TOTAL CA: CPT | Performed by: PHYSICIAN ASSISTANT

## 2017-01-01 PROCEDURE — 86920 COMPATIBILITY TEST SPIN: CPT | Performed by: INTERNAL MEDICINE

## 2017-01-01 PROCEDURE — 84100 ASSAY OF PHOSPHORUS: CPT | Performed by: PHYSICIAN ASSISTANT

## 2017-01-01 PROCEDURE — 82550 ASSAY OF CK (CPK): CPT | Performed by: EMERGENCY MEDICINE

## 2017-01-01 PROCEDURE — 77030004565 HC CATH ANGI DX TMPO CARD -B: Performed by: SURGERY

## 2017-01-01 PROCEDURE — 88305 TISSUE EXAM BY PATHOLOGIST: CPT | Performed by: PHYSICIAN ASSISTANT

## 2017-01-01 PROCEDURE — 32551 INSERTION OF CHEST TUBE: CPT

## 2017-01-01 PROCEDURE — 74011000250 HC RX REV CODE- 250

## 2017-01-01 PROCEDURE — 77030005514 HC CATH URETH FOL14 BARD -A

## 2017-01-01 PROCEDURE — 85730 THROMBOPLASTIN TIME PARTIAL: CPT | Performed by: HOSPITALIST

## 2017-01-01 PROCEDURE — 32555 ASPIRATE PLEURA W/ IMAGING: CPT

## 2017-01-01 PROCEDURE — 99285 EMERGENCY DEPT VISIT HI MDM: CPT

## 2017-01-01 PROCEDURE — B51V1ZZ FLUOROSCOPY OF OTHER VEINS USING LOW OSMOLAR CONTRAST: ICD-10-PCS | Performed by: SURGERY

## 2017-01-01 PROCEDURE — 84100 ASSAY OF PHOSPHORUS: CPT

## 2017-01-01 PROCEDURE — 84157 ASSAY OF PROTEIN OTHER: CPT | Performed by: INTERNAL MEDICINE

## 2017-01-01 PROCEDURE — 74011000250 HC RX REV CODE- 250: Performed by: INTERNAL MEDICINE

## 2017-01-01 PROCEDURE — 87040 BLOOD CULTURE FOR BACTERIA: CPT | Performed by: EMERGENCY MEDICINE

## 2017-01-01 PROCEDURE — 88112 CYTOPATH CELL ENHANCE TECH: CPT | Performed by: INTERNAL MEDICINE

## 2017-01-01 PROCEDURE — P9016 RBC LEUKOCYTES REDUCED: HCPCS | Performed by: INTERNAL MEDICINE

## 2017-01-01 PROCEDURE — 85025 COMPLETE CBC W/AUTO DIFF WBC: CPT | Performed by: HOSPITALIST

## 2017-01-01 PROCEDURE — C1751 CATH, INF, PER/CENT/MIDLINE: HCPCS

## 2017-01-01 PROCEDURE — 85379 FIBRIN DEGRADATION QUANT: CPT | Performed by: EMERGENCY MEDICINE

## 2017-01-01 PROCEDURE — 80053 COMPREHEN METABOLIC PANEL: CPT | Performed by: NURSE PRACTITIONER

## 2017-01-01 PROCEDURE — 80053 COMPREHEN METABOLIC PANEL: CPT | Performed by: HOSPITALIST

## 2017-01-01 PROCEDURE — 31622 DX BRONCHOSCOPE/WASH: CPT

## 2017-01-01 PROCEDURE — 76060000032 HC ANESTHESIA 0.5 TO 1 HR: Performed by: SURGERY

## 2017-01-01 PROCEDURE — 30233N1 TRANSFUSION OF NONAUTOLOGOUS RED BLOOD CELLS INTO PERIPHERAL VEIN, PERCUTANEOUS APPROACH: ICD-10-PCS | Performed by: HOSPITALIST

## 2017-01-01 PROCEDURE — 83735 ASSAY OF MAGNESIUM: CPT | Performed by: EMERGENCY MEDICINE

## 2017-01-01 PROCEDURE — 0W9B30Z DRAINAGE OF LEFT PLEURAL CAVITY WITH DRAINAGE DEVICE, PERCUTANEOUS APPROACH: ICD-10-PCS | Performed by: INTERNAL MEDICINE

## 2017-01-01 PROCEDURE — 93971 EXTREMITY STUDY: CPT

## 2017-01-01 PROCEDURE — 89050 BODY FLUID CELL COUNT: CPT | Performed by: INTERNAL MEDICINE

## 2017-01-01 PROCEDURE — 36415 COLL VENOUS BLD VENIPUNCTURE: CPT | Performed by: PHYSICIAN ASSISTANT

## 2017-01-01 PROCEDURE — 87070 CULTURE OTHR SPECIMN AEROBIC: CPT | Performed by: PHYSICIAN ASSISTANT

## 2017-01-01 PROCEDURE — 80076 HEPATIC FUNCTION PANEL: CPT | Performed by: INTERNAL MEDICINE

## 2017-01-01 PROCEDURE — 74011636320 HC RX REV CODE- 636/320: Performed by: SURGERY

## 2017-01-01 PROCEDURE — 76210000016 HC OR PH I REC 1 TO 1.5 HR: Performed by: SURGERY

## 2017-01-01 PROCEDURE — 74011250637 HC RX REV CODE- 250/637: Performed by: PHYSICIAN ASSISTANT

## 2017-01-01 PROCEDURE — C1725 CATH, TRANSLUMIN NON-LASER: HCPCS | Performed by: SURGERY

## 2017-01-01 PROCEDURE — 74000 XR ABD PORT  1 V: CPT

## 2017-01-01 PROCEDURE — 83615 LACTATE (LD) (LDH) ENZYME: CPT | Performed by: PHYSICIAN ASSISTANT

## 2017-01-01 PROCEDURE — 71275 CT ANGIOGRAPHY CHEST: CPT

## 2017-01-01 PROCEDURE — 71250 CT THORAX DX C-: CPT

## 2017-01-01 PROCEDURE — 88112 CYTOPATH CELL ENHANCE TECH: CPT | Performed by: PHYSICIAN ASSISTANT

## 2017-01-01 PROCEDURE — 86900 BLOOD TYPING SEROLOGIC ABO: CPT | Performed by: INTERNAL MEDICINE

## 2017-01-01 PROCEDURE — 77030003390 HC NDL ANGI MRTM -A: Performed by: SURGERY

## 2017-01-01 PROCEDURE — 31500 INSERT EMERGENCY AIRWAY: CPT

## 2017-01-01 PROCEDURE — 82945 GLUCOSE OTHER FLUID: CPT | Performed by: PHYSICIAN ASSISTANT

## 2017-01-01 PROCEDURE — 057C3ZZ DILATION OF LEFT BASILIC VEIN, PERCUTANEOUS APPROACH: ICD-10-PCS | Performed by: SURGERY

## 2017-01-01 PROCEDURE — 82550 ASSAY OF CK (CPK): CPT | Performed by: PHYSICIAN ASSISTANT

## 2017-01-01 RX ORDER — SODIUM CHLORIDE 9 MG/ML
1000 INJECTION, SOLUTION INTRAVENOUS ONCE
Status: COMPLETED | OUTPATIENT
Start: 2017-01-01 | End: 2017-01-01

## 2017-01-01 RX ORDER — MIDAZOLAM HYDROCHLORIDE 1 MG/ML
INJECTION, SOLUTION INTRAMUSCULAR; INTRAVENOUS
Status: COMPLETED
Start: 2017-01-01 | End: 2017-01-01

## 2017-01-01 RX ORDER — SODIUM,POTASSIUM PHOSPHATES 280-250MG
1 POWDER IN PACKET (EA) ORAL 2 TIMES DAILY
Status: COMPLETED | OUTPATIENT
Start: 2017-01-01 | End: 2017-01-01

## 2017-01-01 RX ORDER — FENTANYL CITRATE 50 UG/ML
50 INJECTION, SOLUTION INTRAMUSCULAR; INTRAVENOUS AS NEEDED
Status: DISCONTINUED | OUTPATIENT
Start: 2017-01-01 | End: 2017-01-01

## 2017-01-01 RX ORDER — SODIUM CHLORIDE 9 MG/ML
150 INJECTION, SOLUTION INTRAVENOUS CONTINUOUS
Status: DISCONTINUED | OUTPATIENT
Start: 2017-01-01 | End: 2017-01-01

## 2017-01-01 RX ORDER — FENTANYL CITRATE 50 UG/ML
INJECTION, SOLUTION INTRAMUSCULAR; INTRAVENOUS
Status: COMPLETED
Start: 2017-01-01 | End: 2017-01-01

## 2017-01-01 RX ORDER — POTASSIUM CHLORIDE 1.5 G/1.77G
40 POWDER, FOR SOLUTION ORAL
Status: COMPLETED | OUTPATIENT
Start: 2017-01-01 | End: 2017-01-01

## 2017-01-01 RX ORDER — HEPARIN SODIUM 5000 [USP'U]/ML
5000 INJECTION, SOLUTION INTRAVENOUS; SUBCUTANEOUS EVERY 8 HOURS
Status: DISCONTINUED | OUTPATIENT
Start: 2017-01-01 | End: 2017-01-01 | Stop reason: HOSPADM

## 2017-01-01 RX ORDER — DIPHENHYDRAMINE HYDROCHLORIDE 50 MG/ML
12.5 INJECTION, SOLUTION INTRAMUSCULAR; INTRAVENOUS
Status: DISCONTINUED | OUTPATIENT
Start: 2017-01-01 | End: 2017-01-01 | Stop reason: HOSPADM

## 2017-01-01 RX ORDER — POLYETHYLENE GLYCOL 3350 17 G/17G
17 POWDER, FOR SOLUTION ORAL DAILY
Status: DISCONTINUED | OUTPATIENT
Start: 2017-01-01 | End: 2017-01-01 | Stop reason: HOSPADM

## 2017-01-01 RX ORDER — NOREPINEPHRINE BITARTRATE 1 MG/ML
INJECTION, SOLUTION INTRAVENOUS
Status: DISPENSED
Start: 2017-01-01 | End: 2017-01-01

## 2017-01-01 RX ORDER — SODIUM CHLORIDE 9 MG/ML
INJECTION, SOLUTION INTRAVENOUS
Status: DISCONTINUED | OUTPATIENT
Start: 2017-01-01 | End: 2017-01-01 | Stop reason: HOSPADM

## 2017-01-01 RX ORDER — WARFARIN SODIUM 5 MG/1
10 TABLET ORAL
Status: COMPLETED | OUTPATIENT
Start: 2017-01-01 | End: 2017-01-01

## 2017-01-01 RX ORDER — CEFAZOLIN SODIUM 2 G/50ML
2 SOLUTION INTRAVENOUS ONCE
Status: COMPLETED | OUTPATIENT
Start: 2017-01-01 | End: 2017-01-01

## 2017-01-01 RX ORDER — ALBUMIN HUMAN 250 G/1000ML
12.5 SOLUTION INTRAVENOUS
Status: COMPLETED | OUTPATIENT
Start: 2017-01-01 | End: 2017-01-01

## 2017-01-01 RX ORDER — PROPOFOL 10 MG/ML
INJECTION, EMULSION INTRAVENOUS
Status: COMPLETED
Start: 2017-01-01 | End: 2017-01-01

## 2017-01-01 RX ORDER — SODIUM CHLORIDE 9 MG/ML
500 INJECTION, SOLUTION INTRAVENOUS CONTINUOUS
Status: DISCONTINUED | OUTPATIENT
Start: 2017-01-01 | End: 2017-01-01

## 2017-01-01 RX ORDER — METOPROLOL TARTRATE 50 MG/1
50 TABLET ORAL 2 TIMES DAILY
Status: DISCONTINUED | OUTPATIENT
Start: 2017-01-01 | End: 2017-01-01 | Stop reason: HOSPADM

## 2017-01-01 RX ORDER — ONDANSETRON 2 MG/ML
4 INJECTION INTRAMUSCULAR; INTRAVENOUS
Status: DISCONTINUED | OUTPATIENT
Start: 2017-01-01 | End: 2017-01-01 | Stop reason: HOSPADM

## 2017-01-01 RX ORDER — MIDAZOLAM HYDROCHLORIDE 1 MG/ML
2 INJECTION, SOLUTION INTRAMUSCULAR; INTRAVENOUS ONCE
Status: COMPLETED | OUTPATIENT
Start: 2017-01-01 | End: 2017-01-01

## 2017-01-01 RX ORDER — SODIUM CHLORIDE, SODIUM LACTATE, POTASSIUM CHLORIDE, CALCIUM CHLORIDE 600; 310; 30; 20 MG/100ML; MG/100ML; MG/100ML; MG/100ML
75 INJECTION, SOLUTION INTRAVENOUS CONTINUOUS
Status: DISCONTINUED | OUTPATIENT
Start: 2017-01-01 | End: 2017-01-01 | Stop reason: HOSPADM

## 2017-01-01 RX ORDER — ACETAMINOPHEN 650 MG/1
650 SUPPOSITORY RECTAL
Status: DISCONTINUED | OUTPATIENT
Start: 2017-01-01 | End: 2017-01-01 | Stop reason: HOSPADM

## 2017-01-01 RX ORDER — HYDROCODONE BITARTRATE AND ACETAMINOPHEN 5; 325 MG/1; MG/1
1 TABLET ORAL ONCE
Status: ACTIVE | OUTPATIENT
Start: 2017-01-01 | End: 2017-01-01

## 2017-01-01 RX ORDER — ALBUTEROL SULFATE 90 UG/1
2 AEROSOL, METERED RESPIRATORY (INHALATION)
Status: DISCONTINUED | OUTPATIENT
Start: 2017-01-01 | End: 2017-01-01 | Stop reason: CLARIF

## 2017-01-01 RX ORDER — SODIUM CHLORIDE 9 MG/ML
250 INJECTION, SOLUTION INTRAVENOUS AS NEEDED
Status: DISCONTINUED | OUTPATIENT
Start: 2017-01-01 | End: 2017-01-01 | Stop reason: HOSPADM

## 2017-01-01 RX ORDER — SEVELAMER CARBONATE 800 MG/1
800 TABLET, FILM COATED ORAL
Status: DISCONTINUED | OUTPATIENT
Start: 2017-01-01 | End: 2017-01-01 | Stop reason: HOSPADM

## 2017-01-01 RX ORDER — HEPARIN SODIUM 1000 [USP'U]/ML
INJECTION, SOLUTION INTRAVENOUS; SUBCUTANEOUS AS NEEDED
Status: DISCONTINUED | OUTPATIENT
Start: 2017-01-01 | End: 2017-01-01 | Stop reason: HOSPADM

## 2017-01-01 RX ORDER — MORPHINE SULFATE 2 MG/ML
2 INJECTION, SOLUTION INTRAMUSCULAR; INTRAVENOUS
Status: DISCONTINUED | OUTPATIENT
Start: 2017-01-01 | End: 2017-01-01 | Stop reason: HOSPADM

## 2017-01-01 RX ORDER — ALBUMIN HUMAN 250 G/1000ML
12.5 SOLUTION INTRAVENOUS
Status: DISCONTINUED | OUTPATIENT
Start: 2017-01-01 | End: 2017-01-01 | Stop reason: HOSPADM

## 2017-01-01 RX ORDER — LIDOCAINE HYDROCHLORIDE 10 MG/ML
INJECTION, SOLUTION EPIDURAL; INFILTRATION; INTRACAUDAL; PERINEURAL
Status: DISPENSED
Start: 2017-01-01 | End: 2017-01-01

## 2017-01-01 RX ORDER — LIDOCAINE HYDROCHLORIDE 20 MG/ML
INJECTION, SOLUTION EPIDURAL; INFILTRATION; INTRACAUDAL; PERINEURAL AS NEEDED
Status: DISCONTINUED | OUTPATIENT
Start: 2017-01-01 | End: 2017-01-01 | Stop reason: HOSPADM

## 2017-01-01 RX ORDER — FENTANYL CITRATE 50 UG/ML
50 INJECTION, SOLUTION INTRAMUSCULAR; INTRAVENOUS ONCE
Status: COMPLETED | OUTPATIENT
Start: 2017-01-01 | End: 2017-01-01

## 2017-01-01 RX ORDER — ONDANSETRON 2 MG/ML
INJECTION INTRAMUSCULAR; INTRAVENOUS
Status: COMPLETED
Start: 2017-01-01 | End: 2017-01-01

## 2017-01-01 RX ORDER — HEPARIN SODIUM 200 [USP'U]/100ML
INJECTION, SOLUTION INTRAVENOUS
Status: DISPENSED
Start: 2017-01-01 | End: 2017-01-01

## 2017-01-01 RX ORDER — MIDAZOLAM HYDROCHLORIDE 1 MG/ML
4 INJECTION, SOLUTION INTRAMUSCULAR; INTRAVENOUS ONCE
Status: COMPLETED | OUTPATIENT
Start: 2017-01-01 | End: 2017-01-01

## 2017-01-01 RX ORDER — SODIUM,POTASSIUM PHOSPHATES 280-250MG
2 POWDER IN PACKET (EA) ORAL ONCE
Status: COMPLETED | OUTPATIENT
Start: 2017-01-01 | End: 2017-01-01

## 2017-01-01 RX ORDER — DOXERCALCIFEROL 4 UG/2ML
6 INJECTION INTRAVENOUS
Status: DISCONTINUED | OUTPATIENT
Start: 2017-01-01 | End: 2017-01-01 | Stop reason: HOSPADM

## 2017-01-01 RX ORDER — LORAZEPAM 2 MG/ML
0.5 INJECTION INTRAMUSCULAR ONCE
Status: COMPLETED | OUTPATIENT
Start: 2017-01-01 | End: 2017-01-01

## 2017-01-01 RX ORDER — IPRATROPIUM BROMIDE AND ALBUTEROL SULFATE 2.5; .5 MG/3ML; MG/3ML
3 SOLUTION RESPIRATORY (INHALATION)
Status: DISCONTINUED | OUTPATIENT
Start: 2017-01-01 | End: 2017-01-01 | Stop reason: HOSPADM

## 2017-01-01 RX ORDER — FENTANYL CITRATE 50 UG/ML
INJECTION, SOLUTION INTRAMUSCULAR; INTRAVENOUS AS NEEDED
Status: DISCONTINUED | OUTPATIENT
Start: 2017-01-01 | End: 2017-01-01 | Stop reason: HOSPADM

## 2017-01-01 RX ORDER — HEPARIN SODIUM 200 [USP'U]/100ML
INJECTION, SOLUTION INTRAVENOUS
Status: DISCONTINUED | OUTPATIENT
Start: 2017-01-01 | End: 2017-01-01 | Stop reason: HOSPADM

## 2017-01-01 RX ORDER — CHLORHEXIDINE GLUCONATE 1.2 MG/ML
10 RINSE ORAL 2 TIMES DAILY
Status: DISCONTINUED | OUTPATIENT
Start: 2017-01-01 | End: 2017-01-01

## 2017-01-01 RX ORDER — FENTANYL CITRATE 50 UG/ML
25-50 INJECTION, SOLUTION INTRAMUSCULAR; INTRAVENOUS
Status: DISCONTINUED | OUTPATIENT
Start: 2017-01-01 | End: 2017-01-01 | Stop reason: SDUPTHER

## 2017-01-01 RX ORDER — SODIUM CHLORIDE 9 MG/ML
100 INJECTION, SOLUTION INTRAVENOUS
Status: DISCONTINUED | OUTPATIENT
Start: 2017-01-01 | End: 2017-01-01 | Stop reason: HOSPADM

## 2017-01-01 RX ORDER — POTASSIUM CHLORIDE 20MEQ/15ML
40 LIQUID (ML) ORAL
Status: DISCONTINUED | OUTPATIENT
Start: 2017-01-01 | End: 2017-01-01 | Stop reason: RX

## 2017-01-01 RX ORDER — GLYCOPYRROLATE 0.2 MG/ML
0.2 INJECTION INTRAMUSCULAR; INTRAVENOUS
Status: DISCONTINUED | OUTPATIENT
Start: 2017-01-01 | End: 2017-01-01 | Stop reason: HOSPADM

## 2017-01-01 RX ORDER — SODIUM CHLORIDE 9 MG/ML
250 INJECTION, SOLUTION INTRAVENOUS ONCE
Status: COMPLETED | OUTPATIENT
Start: 2017-01-01 | End: 2017-01-01

## 2017-01-01 RX ORDER — DEXTROSE 50 % IN WATER (D50W) INTRAVENOUS SYRINGE
25-50 AS NEEDED
Status: DISCONTINUED | OUTPATIENT
Start: 2017-01-01 | End: 2017-01-01 | Stop reason: HOSPADM

## 2017-01-01 RX ORDER — FLUMAZENIL 0.1 MG/ML
0.2 INJECTION INTRAVENOUS ONCE
Status: COMPLETED | OUTPATIENT
Start: 2017-01-01 | End: 2017-01-01

## 2017-01-01 RX ORDER — PROPOFOL 10 MG/ML
INJECTION, EMULSION INTRAVENOUS AS NEEDED
Status: DISCONTINUED | OUTPATIENT
Start: 2017-01-01 | End: 2017-01-01 | Stop reason: HOSPADM

## 2017-01-01 RX ORDER — MIDAZOLAM HYDROCHLORIDE 1 MG/ML
1-2 INJECTION, SOLUTION INTRAMUSCULAR; INTRAVENOUS
Status: DISCONTINUED | OUTPATIENT
Start: 2017-01-01 | End: 2017-01-01 | Stop reason: HOSPADM

## 2017-01-01 RX ORDER — HEPARIN SODIUM 1000 [USP'U]/ML
1000 INJECTION, SOLUTION INTRAVENOUS; SUBCUTANEOUS
Status: DISCONTINUED | OUTPATIENT
Start: 2017-01-01 | End: 2017-01-01 | Stop reason: HOSPADM

## 2017-01-01 RX ORDER — SODIUM CHLORIDE 9 MG/ML
500 INJECTION, SOLUTION INTRAVENOUS ONCE
Status: COMPLETED | OUTPATIENT
Start: 2017-01-01 | End: 2017-01-01

## 2017-01-01 RX ORDER — IPRATROPIUM BROMIDE AND ALBUTEROL SULFATE 2.5; .5 MG/3ML; MG/3ML
3 SOLUTION RESPIRATORY (INHALATION)
Status: COMPLETED | OUTPATIENT
Start: 2017-01-01 | End: 2017-01-01

## 2017-01-01 RX ORDER — POTASSIUM CHLORIDE 1.5 G/1.77G
20 POWDER, FOR SOLUTION ORAL
Status: DISCONTINUED | OUTPATIENT
Start: 2017-01-01 | End: 2017-01-01

## 2017-01-01 RX ORDER — DEXTROSE MONOHYDRATE AND SODIUM CHLORIDE 5; .225 G/100ML; G/100ML
25 INJECTION, SOLUTION INTRAVENOUS CONTINUOUS
Status: DISCONTINUED | OUTPATIENT
Start: 2017-01-01 | End: 2017-01-01 | Stop reason: HOSPADM

## 2017-01-01 RX ORDER — MAGNESIUM SULFATE 100 %
4 CRYSTALS MISCELLANEOUS AS NEEDED
Status: DISCONTINUED | OUTPATIENT
Start: 2017-01-01 | End: 2017-01-01 | Stop reason: HOSPADM

## 2017-01-01 RX ORDER — POTASSIUM CHLORIDE 20MEQ/15ML
20 LIQUID (ML) ORAL
Status: DISCONTINUED | OUTPATIENT
Start: 2017-01-01 | End: 2017-01-01

## 2017-01-01 RX ORDER — ACETAMINOPHEN 325 MG/1
650 TABLET ORAL
Status: DISCONTINUED | OUTPATIENT
Start: 2017-01-01 | End: 2017-01-01 | Stop reason: HOSPADM

## 2017-01-01 RX ORDER — DIPHENHYDRAMINE HYDROCHLORIDE 50 MG/ML
25 INJECTION, SOLUTION INTRAMUSCULAR; INTRAVENOUS
Status: DISCONTINUED | OUTPATIENT
Start: 2017-01-01 | End: 2017-01-01 | Stop reason: HOSPADM

## 2017-01-01 RX ORDER — HEPARIN SODIUM 5000 [USP'U]/ML
5000 INJECTION, SOLUTION INTRAVENOUS; SUBCUTANEOUS EVERY 12 HOURS
Status: DISCONTINUED | OUTPATIENT
Start: 2017-01-01 | End: 2017-01-01

## 2017-01-01 RX ORDER — PROPOFOL 10 MG/ML
0-50 VIAL (ML) INTRAVENOUS
Status: DISCONTINUED | OUTPATIENT
Start: 2017-01-01 | End: 2017-01-01

## 2017-01-01 RX ORDER — MORPHINE SULFATE 2 MG/ML
2 INJECTION, SOLUTION INTRAMUSCULAR; INTRAVENOUS
Status: DISCONTINUED | OUTPATIENT
Start: 2017-01-01 | End: 2017-01-01

## 2017-01-01 RX ORDER — HYDROCODONE BITARTRATE AND ACETAMINOPHEN 5; 325 MG/1; MG/1
1 TABLET ORAL
Status: DISCONTINUED | OUTPATIENT
Start: 2017-01-01 | End: 2017-01-01 | Stop reason: HOSPADM

## 2017-01-01 RX ORDER — FUROSEMIDE 10 MG/ML
60 INJECTION INTRAMUSCULAR; INTRAVENOUS 2 TIMES DAILY
Status: DISCONTINUED | OUTPATIENT
Start: 2017-01-01 | End: 2017-01-01

## 2017-01-01 RX ORDER — HYDRALAZINE HYDROCHLORIDE 50 MG/1
50 TABLET, FILM COATED ORAL 3 TIMES DAILY
Status: DISCONTINUED | OUTPATIENT
Start: 2017-01-01 | End: 2017-01-01 | Stop reason: HOSPADM

## 2017-01-01 RX ORDER — PROPOFOL 10 MG/ML
INJECTION, EMULSION INTRAVENOUS
Status: DISCONTINUED | OUTPATIENT
Start: 2017-01-01 | End: 2017-01-01 | Stop reason: HOSPADM

## 2017-01-01 RX ORDER — LIDOCAINE HYDROCHLORIDE 10 MG/ML
INJECTION, SOLUTION EPIDURAL; INFILTRATION; INTRACAUDAL; PERINEURAL AS NEEDED
Status: DISCONTINUED | OUTPATIENT
Start: 2017-01-01 | End: 2017-01-01 | Stop reason: HOSPADM

## 2017-01-01 RX ORDER — HEPARIN SODIUM 5000 [USP'U]/ML
INJECTION, SOLUTION INTRAVENOUS; SUBCUTANEOUS
Status: DISPENSED
Start: 2017-01-01 | End: 2017-01-01

## 2017-01-01 RX ORDER — FAMOTIDINE 20 MG/50ML
20 INJECTION, SOLUTION INTRAVENOUS EVERY 12 HOURS
Status: DISCONTINUED | OUTPATIENT
Start: 2017-01-01 | End: 2017-01-01 | Stop reason: CLARIF

## 2017-01-01 RX ORDER — ALBUTEROL SULFATE 0.83 MG/ML
2.5 SOLUTION RESPIRATORY (INHALATION)
Status: DISCONTINUED | OUTPATIENT
Start: 2017-01-01 | End: 2017-01-01 | Stop reason: HOSPADM

## 2017-01-01 RX ADMIN — PROPOFOL 20 MG: 10 INJECTION, EMULSION INTRAVENOUS at 18:26

## 2017-01-01 RX ADMIN — SODIUM CHLORIDE 250 ML: 9 INJECTION, SOLUTION INTRAVENOUS at 09:19

## 2017-01-01 RX ADMIN — IPRATROPIUM BROMIDE AND ALBUTEROL SULFATE 3 ML: 2.5; .5 SOLUTION RESPIRATORY (INHALATION) at 17:03

## 2017-01-01 RX ADMIN — IPRATROPIUM BROMIDE AND ALBUTEROL SULFATE 3 ML: 2.5; .5 SOLUTION RESPIRATORY (INHALATION) at 20:45

## 2017-01-01 RX ADMIN — LIDOCAINE HYDROCHLORIDE: 10 INJECTION, SOLUTION EPIDURAL; INFILTRATION; INTRACAUDAL; PERINEURAL at 10:00

## 2017-01-01 RX ADMIN — MIDAZOLAM HYDROCHLORIDE 2 MG: 1 INJECTION, SOLUTION INTRAMUSCULAR; INTRAVENOUS at 20:00

## 2017-01-01 RX ADMIN — POTASSIUM & SODIUM PHOSPHATES POWDER PACK 280-160-250 MG 1 PACKET: 280-160-250 PACK at 08:23

## 2017-01-01 RX ADMIN — MIDAZOLAM HYDROCHLORIDE 2 MG: 1 INJECTION, SOLUTION INTRAMUSCULAR; INTRAVENOUS at 17:50

## 2017-01-01 RX ADMIN — FENTANYL CITRATE 25 MCG: 50 INJECTION, SOLUTION INTRAMUSCULAR; INTRAVENOUS at 18:42

## 2017-01-01 RX ADMIN — IPRATROPIUM BROMIDE AND ALBUTEROL SULFATE 3 ML: 2.5; .5 SOLUTION RESPIRATORY (INHALATION) at 03:27

## 2017-01-01 RX ADMIN — DEXTROSE MONOHYDRATE 4 MCG/MIN: 5 INJECTION, SOLUTION INTRAVENOUS at 09:04

## 2017-01-01 RX ADMIN — CHLORHEXIDINE GLUCONATE 10 ML: 1.2 RINSE ORAL at 17:18

## 2017-01-01 RX ADMIN — AMIODARONE HYDROCHLORIDE 0.5 MG/MIN: 1.8 INJECTION, SOLUTION INTRAVENOUS at 20:08

## 2017-01-01 RX ADMIN — IPRATROPIUM BROMIDE AND ALBUTEROL SULFATE 3 ML: 2.5; .5 SOLUTION RESPIRATORY (INHALATION) at 10:55

## 2017-01-01 RX ADMIN — FAMOTIDINE 20 MG: 10 INJECTION INTRAVENOUS at 22:21

## 2017-01-01 RX ADMIN — NOREPINEPHRINE BITARTRATE 2 MCG/MIN: 1 INJECTION INTRAVENOUS at 17:39

## 2017-01-01 RX ADMIN — IPRATROPIUM BROMIDE AND ALBUTEROL SULFATE 3 ML: 2.5; .5 SOLUTION RESPIRATORY (INHALATION) at 08:09

## 2017-01-01 RX ADMIN — CHLORHEXIDINE GLUCONATE 10 ML: 1.2 RINSE ORAL at 18:16

## 2017-01-01 RX ADMIN — PHENYLEPHRINE HYDROCHLORIDE 25 MCG/MIN: 10 INJECTION INTRAVENOUS at 23:11

## 2017-01-01 RX ADMIN — HEPARIN SODIUM 5000 UNITS: 5000 INJECTION, SOLUTION INTRAVENOUS; SUBCUTANEOUS at 00:09

## 2017-01-01 RX ADMIN — CEFEPIME HYDROCHLORIDE 0.5 G: 1 INJECTION, POWDER, FOR SOLUTION INTRAMUSCULAR; INTRAVENOUS at 21:35

## 2017-01-01 RX ADMIN — HEPARIN SODIUM 5000 UNITS: 5000 INJECTION, SOLUTION INTRAVENOUS; SUBCUTANEOUS at 00:38

## 2017-01-01 RX ADMIN — CHLORHEXIDINE GLUCONATE 10 ML: 1.2 RINSE ORAL at 17:28

## 2017-01-01 RX ADMIN — IPRATROPIUM BROMIDE AND ALBUTEROL SULFATE 3 ML: 2.5; .5 SOLUTION RESPIRATORY (INHALATION) at 01:22

## 2017-01-01 RX ADMIN — IPRATROPIUM BROMIDE AND ALBUTEROL SULFATE 3 ML: 2.5; .5 SOLUTION RESPIRATORY (INHALATION) at 09:24

## 2017-01-01 RX ADMIN — IPRATROPIUM BROMIDE AND ALBUTEROL SULFATE 3 ML: 2.5; .5 SOLUTION RESPIRATORY (INHALATION) at 16:20

## 2017-01-01 RX ADMIN — CHLORHEXIDINE GLUCONATE 10 ML: 1.2 RINSE ORAL at 17:20

## 2017-01-01 RX ADMIN — HEPARIN SODIUM 5000 UNITS: 5000 INJECTION, SOLUTION INTRAVENOUS; SUBCUTANEOUS at 00:50

## 2017-01-01 RX ADMIN — Medication 25 MCG/HR: at 20:14

## 2017-01-01 RX ADMIN — HEPARIN SODIUM 1000 UNITS: 1000 INJECTION, SOLUTION INTRAVENOUS; SUBCUTANEOUS at 12:13

## 2017-01-01 RX ADMIN — VANCOMYCIN HYDROCHLORIDE 2000 MG: 10 INJECTION, POWDER, LYOPHILIZED, FOR SOLUTION INTRAVENOUS at 04:46

## 2017-01-01 RX ADMIN — MIDAZOLAM HYDROCHLORIDE 2 MG: 1 INJECTION, SOLUTION INTRAMUSCULAR; INTRAVENOUS at 00:28

## 2017-01-01 RX ADMIN — SODIUM CHLORIDE 1 G: 900 INJECTION, SOLUTION INTRAVENOUS at 00:37

## 2017-01-01 RX ADMIN — ONDANSETRON 4 MG: 2 INJECTION INTRAMUSCULAR; INTRAVENOUS at 19:30

## 2017-01-01 RX ADMIN — PROPOFOL 50 MCG/KG/MIN: 10 INJECTION, EMULSION INTRAVENOUS at 17:41

## 2017-01-01 RX ADMIN — CHLORHEXIDINE GLUCONATE 10 ML: 1.2 RINSE ORAL at 08:23

## 2017-01-01 RX ADMIN — LIDOCAINE HYDROCHLORIDE: 10 INJECTION, SOLUTION EPIDURAL; INFILTRATION; INTRACAUDAL; PERINEURAL at 07:24

## 2017-01-01 RX ADMIN — Medication 2 MG: at 22:00

## 2017-01-01 RX ADMIN — FAMOTIDINE 20 MG: 10 INJECTION, SOLUTION INTRAVENOUS at 03:30

## 2017-01-01 RX ADMIN — ERYTHROPOIETIN 1000 UNITS: 2000 INJECTION, SOLUTION INTRAVENOUS; SUBCUTANEOUS at 21:11

## 2017-01-01 RX ADMIN — HYDRALAZINE HYDROCHLORIDE 50 MG: 50 TABLET, FILM COATED ORAL at 09:30

## 2017-01-01 RX ADMIN — HEPARIN SODIUM 5000 UNITS: 5000 INJECTION, SOLUTION INTRAVENOUS; SUBCUTANEOUS at 02:07

## 2017-01-01 RX ADMIN — HEPARIN SODIUM 5000 UNITS: 5000 INJECTION, SOLUTION INTRAVENOUS; SUBCUTANEOUS at 12:18

## 2017-01-01 RX ADMIN — IPRATROPIUM BROMIDE AND ALBUTEROL SULFATE 3 ML: 2.5; .5 SOLUTION RESPIRATORY (INHALATION) at 19:44

## 2017-01-01 RX ADMIN — FAMOTIDINE 20 MG: 10 INJECTION INTRAVENOUS at 21:37

## 2017-01-01 RX ADMIN — HYDRALAZINE HYDROCHLORIDE 50 MG: 50 TABLET, FILM COATED ORAL at 22:44

## 2017-01-01 RX ADMIN — CHLORHEXIDINE GLUCONATE 10 ML: 1.2 RINSE ORAL at 08:25

## 2017-01-01 RX ADMIN — FENTANYL CITRATE 25 MCG: 50 INJECTION INTRAMUSCULAR; INTRAVENOUS at 13:55

## 2017-01-01 RX ADMIN — IPRATROPIUM BROMIDE AND ALBUTEROL SULFATE 3 ML: 2.5; .5 SOLUTION RESPIRATORY (INHALATION) at 21:10

## 2017-01-01 RX ADMIN — HEPARIN SODIUM 1000 UNITS: 1000 INJECTION, SOLUTION INTRAVENOUS; SUBCUTANEOUS at 21:41

## 2017-01-01 RX ADMIN — FAMOTIDINE 20 MG: 10 INJECTION, SOLUTION INTRAVENOUS at 09:21

## 2017-01-01 RX ADMIN — WARFARIN SODIUM 10 MG: 5 TABLET ORAL at 13:20

## 2017-01-01 RX ADMIN — CHLORHEXIDINE GLUCONATE 10 ML: 1.2 RINSE ORAL at 08:24

## 2017-01-01 RX ADMIN — FENTANYL CITRATE 50 MCG: 50 INJECTION, SOLUTION INTRAMUSCULAR; INTRAVENOUS at 13:20

## 2017-01-01 RX ADMIN — SODIUM CHLORIDE 150 ML/HR: 9 INJECTION, SOLUTION INTRAVENOUS at 23:52

## 2017-01-01 RX ADMIN — FENTANYL CITRATE 50 MCG: 50 INJECTION INTRAMUSCULAR; INTRAVENOUS at 07:10

## 2017-01-01 RX ADMIN — VANCOMYCIN HYDROCHLORIDE 500 MG: 500 INJECTION, POWDER, LYOPHILIZED, FOR SOLUTION INTRAVENOUS at 12:18

## 2017-01-01 RX ADMIN — ALBUMIN (HUMAN) 12.5 G: 0.25 INJECTION, SOLUTION INTRAVENOUS at 19:25

## 2017-01-01 RX ADMIN — VANCOMYCIN HYDROCHLORIDE 500 MG: 500 INJECTION, POWDER, LYOPHILIZED, FOR SOLUTION INTRAVENOUS at 19:56

## 2017-01-01 RX ADMIN — LIDOCAINE HYDROCHLORIDE 100 MG: 20 INJECTION, SOLUTION EPIDURAL; INFILTRATION; INTRACAUDAL; PERINEURAL at 18:30

## 2017-01-01 RX ADMIN — POTASSIUM & SODIUM PHOSPHATES POWDER PACK 280-160-250 MG 2 PACKET: 280-160-250 PACK at 09:22

## 2017-01-01 RX ADMIN — Medication 2 MG: at 18:43

## 2017-01-01 RX ADMIN — CHLORHEXIDINE GLUCONATE 10 ML: 1.2 RINSE ORAL at 08:31

## 2017-01-01 RX ADMIN — CHLORHEXIDINE GLUCONATE 10 ML: 1.2 RINSE ORAL at 09:05

## 2017-01-01 RX ADMIN — FUROSEMIDE 60 MG: 10 INJECTION, SOLUTION INTRAMUSCULAR; INTRAVENOUS at 01:58

## 2017-01-01 RX ADMIN — IPRATROPIUM BROMIDE AND ALBUTEROL SULFATE 3 ML: 2.5; .5 SOLUTION RESPIRATORY (INHALATION) at 14:20

## 2017-01-01 RX ADMIN — DEXTROSE MONOHYDRATE 25 G: 25 INJECTION, SOLUTION INTRAVENOUS at 12:18

## 2017-01-01 RX ADMIN — HEPARIN SODIUM 5000 UNITS: 5000 INJECTION, SOLUTION INTRAVENOUS; SUBCUTANEOUS at 11:31

## 2017-01-01 RX ADMIN — CEFAZOLIN SODIUM 2 G: 2 SOLUTION INTRAVENOUS at 18:21

## 2017-01-01 RX ADMIN — SODIUM CHLORIDE: 9 INJECTION, SOLUTION INTRAVENOUS at 18:21

## 2017-01-01 RX ADMIN — PROPOFOL 100 MCG/KG/MIN: 10 INJECTION, EMULSION INTRAVENOUS at 18:30

## 2017-01-01 RX ADMIN — IPRATROPIUM BROMIDE AND ALBUTEROL SULFATE 3 ML: 2.5; .5 SOLUTION RESPIRATORY (INHALATION) at 13:23

## 2017-01-01 RX ADMIN — SODIUM CHLORIDE 150 ML/HR: 9 INJECTION, SOLUTION INTRAVENOUS at 17:18

## 2017-01-01 RX ADMIN — HEPARIN SODIUM 5000 UNITS: 5000 INJECTION, SOLUTION INTRAVENOUS; SUBCUTANEOUS at 10:00

## 2017-01-01 RX ADMIN — FENTANYL CITRATE 25 MCG: 50 INJECTION INTRAMUSCULAR; INTRAVENOUS at 22:30

## 2017-01-01 RX ADMIN — CHLORHEXIDINE GLUCONATE 10 ML: 1.2 RINSE ORAL at 17:47

## 2017-01-01 RX ADMIN — AMIODARONE HYDROCHLORIDE 0.5 MG/MIN: 1.8 INJECTION, SOLUTION INTRAVENOUS at 07:06

## 2017-01-01 RX ADMIN — SODIUM CHLORIDE 1000 ML: 9 INJECTION, SOLUTION INTRAVENOUS at 19:47

## 2017-01-01 RX ADMIN — IPRATROPIUM BROMIDE AND ALBUTEROL SULFATE 3 ML: 2.5; .5 SOLUTION RESPIRATORY (INHALATION) at 01:24

## 2017-01-01 RX ADMIN — SODIUM CHLORIDE 250 ML/HR: 9 INJECTION, SOLUTION INTRAVENOUS at 11:31

## 2017-01-01 RX ADMIN — FENTANYL CITRATE 50 MCG: 50 INJECTION INTRAMUSCULAR; INTRAVENOUS at 02:28

## 2017-01-01 RX ADMIN — FENTANYL CITRATE 50 MCG: 50 INJECTION, SOLUTION INTRAMUSCULAR; INTRAVENOUS at 18:26

## 2017-01-01 RX ADMIN — DEXTROSE MONOHYDRATE AND SODIUM CHLORIDE 25 ML/HR: 5; .225 INJECTION, SOLUTION INTRAVENOUS at 22:43

## 2017-01-01 RX ADMIN — METOPROLOL TARTRATE 50 MG: 50 TABLET ORAL at 09:30

## 2017-01-01 RX ADMIN — IPRATROPIUM BROMIDE AND ALBUTEROL SULFATE 3 ML: 2.5; .5 SOLUTION RESPIRATORY (INHALATION) at 20:36

## 2017-01-01 RX ADMIN — MIDAZOLAM HYDROCHLORIDE 4 MG: 1 INJECTION, SOLUTION INTRAMUSCULAR; INTRAVENOUS at 22:37

## 2017-01-01 RX ADMIN — POTASSIUM & SODIUM PHOSPHATES POWDER PACK 280-160-250 MG 1 PACKET: 280-160-250 PACK at 17:21

## 2017-01-01 RX ADMIN — DOXERCALCIFEROL 6 MCG: 4 INJECTION, SOLUTION INTRAVENOUS at 11:56

## 2017-01-01 RX ADMIN — IPRATROPIUM BROMIDE AND ALBUTEROL SULFATE 3 ML: 2.5; .5 SOLUTION RESPIRATORY (INHALATION) at 09:21

## 2017-01-01 RX ADMIN — FENTANYL CITRATE 50 MCG: 50 INJECTION INTRAMUSCULAR; INTRAVENOUS at 17:49

## 2017-01-01 RX ADMIN — POLYETHYLENE GLYCOL 3350 17 G: 17 POWDER, FOR SOLUTION ORAL at 08:25

## 2017-01-01 RX ADMIN — FENTANYL CITRATE 50 MCG: 50 INJECTION, SOLUTION INTRAMUSCULAR; INTRAVENOUS at 17:49

## 2017-01-01 RX ADMIN — IPRATROPIUM BROMIDE AND ALBUTEROL SULFATE 3 ML: 2.5; .5 SOLUTION RESPIRATORY (INHALATION) at 01:08

## 2017-01-01 RX ADMIN — PROPOFOL 35 MCG/KG/MIN: 10 INJECTION, EMULSION INTRAVENOUS at 20:13

## 2017-01-01 RX ADMIN — SODIUM CHLORIDE 500 G: 900 INJECTION, SOLUTION INTRAVENOUS at 21:23

## 2017-01-01 RX ADMIN — FENTANYL CITRATE 50 MCG: 50 INJECTION, SOLUTION INTRAMUSCULAR; INTRAVENOUS at 12:24

## 2017-01-01 RX ADMIN — SODIUM CHLORIDE 1 G: 900 INJECTION, SOLUTION INTRAVENOUS at 01:49

## 2017-01-01 RX ADMIN — Medication 2 MG: at 21:00

## 2017-01-01 RX ADMIN — POTASSIUM CHLORIDE 40 MEQ: 1.5 POWDER, FOR SOLUTION ORAL at 08:23

## 2017-01-01 RX ADMIN — IPRATROPIUM BROMIDE AND ALBUTEROL SULFATE 3 ML: 2.5; .5 SOLUTION RESPIRATORY (INHALATION) at 21:20

## 2017-01-01 RX ADMIN — IPRATROPIUM BROMIDE AND ALBUTEROL SULFATE 3 ML: 2.5; .5 SOLUTION RESPIRATORY (INHALATION) at 14:48

## 2017-01-01 RX ADMIN — CHLORHEXIDINE GLUCONATE 10 ML: 1.2 RINSE ORAL at 09:22

## 2017-01-01 RX ADMIN — POLYETHYLENE GLYCOL 3350 17 G: 17 POWDER, FOR SOLUTION ORAL at 10:14

## 2017-01-01 RX ADMIN — MIDAZOLAM HYDROCHLORIDE 2 MG: 1 INJECTION, SOLUTION INTRAMUSCULAR; INTRAVENOUS at 22:28

## 2017-01-01 RX ADMIN — MIDAZOLAM HYDROCHLORIDE 2 MG: 1 INJECTION, SOLUTION INTRAMUSCULAR; INTRAVENOUS at 17:38

## 2017-01-01 RX ADMIN — IOPAMIDOL 50 ML: 755 INJECTION, SOLUTION INTRAVENOUS at 20:34

## 2017-01-01 RX ADMIN — HEPARIN SODIUM 5000 UNITS: 5000 INJECTION, SOLUTION INTRAVENOUS; SUBCUTANEOUS at 12:11

## 2017-01-01 RX ADMIN — SODIUM CHLORIDE 500 G: 900 INJECTION, SOLUTION INTRAVENOUS at 17:47

## 2017-01-01 RX ADMIN — SODIUM CHLORIDE 500 G: 900 INJECTION, SOLUTION INTRAVENOUS at 18:16

## 2017-01-01 RX ADMIN — IPRATROPIUM BROMIDE AND ALBUTEROL SULFATE 3 ML: 2.5; .5 SOLUTION RESPIRATORY (INHALATION) at 07:22

## 2017-01-01 RX ADMIN — POTASSIUM & SODIUM PHOSPHATES POWDER PACK 280-160-250 MG 1 PACKET: 280-160-250 PACK at 17:47

## 2017-01-01 RX ADMIN — MIDAZOLAM HYDROCHLORIDE 2 MG: 1 INJECTION, SOLUTION INTRAMUSCULAR; INTRAVENOUS at 23:58

## 2017-01-01 RX ADMIN — SODIUM CHLORIDE 150 ML/HR: 9 INJECTION, SOLUTION INTRAVENOUS at 06:31

## 2017-01-01 RX ADMIN — FAMOTIDINE 20 MG: 10 INJECTION, SOLUTION INTRAVENOUS at 22:36

## 2017-01-01 RX ADMIN — LIDOCAINE HYDROCHLORIDE: 10 INJECTION, SOLUTION EPIDURAL; INFILTRATION; INTRACAUDAL; PERINEURAL at 11:32

## 2017-01-01 RX ADMIN — HEPARIN SODIUM 5000 UNITS: 5000 INJECTION, SOLUTION INTRAVENOUS; SUBCUTANEOUS at 17:18

## 2017-01-01 RX ADMIN — FAMOTIDINE 20 MG: 10 INJECTION, SOLUTION INTRAVENOUS at 16:34

## 2017-01-01 RX ADMIN — FAMOTIDINE 20 MG: 10 INJECTION INTRAVENOUS at 22:00

## 2017-01-01 RX ADMIN — HEPARIN SODIUM 5000 UNITS: 5000 INJECTION, SOLUTION INTRAVENOUS; SUBCUTANEOUS at 10:13

## 2017-01-01 RX ADMIN — IPRATROPIUM BROMIDE AND ALBUTEROL SULFATE 3 ML: 2.5; .5 SOLUTION RESPIRATORY (INHALATION) at 01:34

## 2017-01-01 RX ADMIN — MIDAZOLAM HYDROCHLORIDE 1 MG: 1 INJECTION, SOLUTION INTRAMUSCULAR; INTRAVENOUS at 22:38

## 2017-01-01 RX ADMIN — SEVELAMER CARBONATE 800 MG: 800 TABLET, FILM COATED ORAL at 09:30

## 2017-01-01 RX ADMIN — SODIUM CHLORIDE 500 ML: 9 INJECTION, SOLUTION INTRAVENOUS at 16:09

## 2017-01-01 RX ADMIN — HEPARIN SODIUM 5000 UNITS: 5000 INJECTION, SOLUTION INTRAVENOUS; SUBCUTANEOUS at 02:18

## 2017-01-01 RX ADMIN — IPRATROPIUM BROMIDE AND ALBUTEROL SULFATE 3 ML: 2.5; .5 SOLUTION RESPIRATORY (INHALATION) at 01:06

## 2017-01-01 RX ADMIN — FAMOTIDINE 20 MG: 10 INJECTION INTRAVENOUS at 23:11

## 2017-01-01 RX ADMIN — IPRATROPIUM BROMIDE AND ALBUTEROL SULFATE 3 ML: 2.5; .5 SOLUTION RESPIRATORY (INHALATION) at 07:40

## 2017-01-01 RX ADMIN — IPRATROPIUM BROMIDE AND ALBUTEROL SULFATE 3 ML: 2.5; .5 SOLUTION RESPIRATORY (INHALATION) at 13:19

## 2017-01-01 RX ADMIN — LIDOCAINE HYDROCHLORIDE: 10 INJECTION, SOLUTION EPIDURAL; INFILTRATION; INTRACAUDAL; PERINEURAL at 09:06

## 2017-01-01 RX ADMIN — IPRATROPIUM BROMIDE AND ALBUTEROL SULFATE 3 ML: 2.5; .5 SOLUTION RESPIRATORY (INHALATION) at 01:48

## 2017-01-01 RX ADMIN — SEVELAMER CARBONATE 800 MG: 800 TABLET, FILM COATED ORAL at 13:05

## 2017-01-01 RX ADMIN — SODIUM CHLORIDE 250 ML: 9 INJECTION, SOLUTION INTRAVENOUS at 22:29

## 2017-01-01 RX ADMIN — DIPHENHYDRAMINE HYDROCHLORIDE 25 MG: 50 INJECTION INTRAMUSCULAR; INTRAVENOUS at 20:30

## 2017-01-01 RX ADMIN — IPRATROPIUM BROMIDE AND ALBUTEROL SULFATE 3 ML: 2.5; .5 SOLUTION RESPIRATORY (INHALATION) at 08:33

## 2017-01-01 RX ADMIN — POTASSIUM & SODIUM PHOSPHATES POWDER PACK 280-160-250 MG 1 PACKET: 280-160-250 PACK at 12:11

## 2017-01-01 RX ADMIN — HEPARIN SODIUM 3000 UNITS: 1000 INJECTION, SOLUTION INTRAVENOUS; SUBCUTANEOUS at 18:39

## 2017-01-01 RX ADMIN — HEPARIN SODIUM 5000 UNITS: 5000 INJECTION, SOLUTION INTRAVENOUS; SUBCUTANEOUS at 09:22

## 2017-01-01 RX ADMIN — SODIUM CHLORIDE: 9 INJECTION, SOLUTION INTRAVENOUS at 23:45

## 2017-01-01 RX ADMIN — PHENYLEPHRINE HYDROCHLORIDE 25 MCG/MIN: 10 INJECTION INTRAVENOUS at 18:23

## 2017-01-01 RX ADMIN — HEPARIN SODIUM 5000 UNITS: 5000 INJECTION, SOLUTION INTRAVENOUS; SUBCUTANEOUS at 02:38

## 2017-01-01 RX ADMIN — ERYTHROPOIETIN 2000 UNITS: 2000 INJECTION, SOLUTION INTRAVENOUS; SUBCUTANEOUS at 11:56

## 2017-01-01 RX ADMIN — IPRATROPIUM BROMIDE AND ALBUTEROL SULFATE 3 ML: 2.5; .5 SOLUTION RESPIRATORY (INHALATION) at 13:45

## 2017-01-01 RX ADMIN — FAMOTIDINE 20 MG: 10 INJECTION INTRAVENOUS at 00:00

## 2017-01-01 RX ADMIN — SODIUM CHLORIDE 250 ML: 9 INJECTION, SOLUTION INTRAVENOUS at 21:24

## 2017-01-01 RX ADMIN — HEPARIN SODIUM 5000 UNITS: 5000 INJECTION, SOLUTION INTRAVENOUS; SUBCUTANEOUS at 17:21

## 2017-01-01 RX ADMIN — FLUMAZENIL 0.2 MG: 0.1 INJECTION, SOLUTION INTRAVENOUS at 21:03

## 2017-01-01 RX ADMIN — IPRATROPIUM BROMIDE AND ALBUTEROL SULFATE 3 ML: 2.5; .5 SOLUTION RESPIRATORY (INHALATION) at 16:12

## 2017-01-01 RX ADMIN — LORAZEPAM 0.5 MG: 2 INJECTION INTRAMUSCULAR; INTRAVENOUS at 20:00

## 2017-01-01 RX ADMIN — DOXERCALCIFEROL 6 MCG: 4 INJECTION, SOLUTION INTRAVENOUS at 21:11

## 2017-01-01 RX ADMIN — Medication 50 MCG/HR: at 06:59

## 2017-01-01 RX ADMIN — POLYETHYLENE GLYCOL 3350 17 G: 17 POWDER, FOR SOLUTION ORAL at 09:22

## 2017-01-01 RX ADMIN — GLYCOPYRROLATE 0.2 MG: 0.2 INJECTION, SOLUTION INTRAMUSCULAR; INTRAVENOUS at 18:50

## 2017-04-17 NOTE — MR AVS SNAPSHOT
Visit Information Date & Time Provider Department Dept. Phone Encounter #  
 4/17/2017  1:40 PM Ryan Live MD [facility] [address] [id number] Your Appointments 5/24/2017  2:40 PM  
CARELINK with Francisco Colóni [facility] ([address]) Appt Note: [address] [place] [address],[facility] Yobany Wilhelm [id number] [phone] [address] [place] [address] [address] Upcoming Health Maintenance Date Due Hepatitis C Screening 1950 DTaP/Tdap/Td series (1 - Tdap) 3/30/1970 BREAST CANCER SCRN MAMMOGRAM 3/30/1999 FOBT Q 1 YEAR AGE 50-75 3/30/1999 ZOSTER VACCINE AGE 60> 3/30/2009 GLAUCOMA SCREENING Q2Y 3/30/2014 OSTEOPOROSIS SCREENING (DEXA) 3/30/2014 MEDICARE YEARLY EXAM 3/30/2014 INFLUENZA AGE 9 TO ADULT 8/1/2016 Pneumococcal 65+ High/Highest Risk (2 of 2 - PCV13) 9/30/2016 Allergies as of 4/17/2017  Review Complete On: 2/17/2016 By: Ryan Live MD  
 No Known Allergies Current Immunizations  Reviewed on 9/30/2015 Name Date Influenza Vaccine (Quad) PF 9/30/2015  8:58 AM  
 Pneumococcal Polysaccharide (PPSV-23) 9/30/2015  9:01 AM  
  
 Not reviewed this visit You Were Diagnosed With   
  
 Codes Comments AV block    -  Primary ICD-10-CM: I44.30 ICD-9-CM: 426.10 Essential hypertension     ICD-10-CM: I10 
ICD-9-CM: 401.9 Hypertensive heart disease without heart failure     ICD-10-CM: I11.9 ICD-9-CM: 402.90 Vitals BP Pulse Height(growth percentile) Weight(growth percentile) BMI OB Status 140/70 82 5' 7\" (1.702 m) 168 lb (76.2 kg) 26.31 kg/m2 Postmenopausal  
 Smoking Status Never Smoker Vitals History BMI and BSA Data Body Mass Index Body Surface Area  
 26.31 kg/m 2 1.9 m 2 Preferred Pharmacy Pharmacy Name Phone [phone] Your Updated Medication List  
  
   
This list is accurate as of: 4/17/17  2:24 PM.  Always use your most recent med list.  
  
  
  
  
 albuterol 90 mcg/actuation inhaler Commonly known as:  PROVENTIL HFA, VENTOLIN HFA, PROAIR HFA Take 2 Puffs by inhalation every four (4) hours as needed for Wheezing. hydrALAZINE 25 mg tablet Commonly known as:  APRESOLINE Take 50 mg by mouth three (3) times daily. metoprolol tartrate 50 mg tablet Commonly known as:  LOPRESSOR 50 mg two (2) times a day. ondansetron hcl 4 mg tablet Commonly known as:  ZOFRAN (AS HYDROCHLORIDE) Take 2 tablets by mouth every eight (8) hours as needed for Nausea. oxyCODONE-acetaminophen 5-325 mg per tablet Commonly known as:  PERCOCET  
  
 sevelamer carbonate 800 mg Tab tab Commonly known as:  Veldon [place] Take 1 Tab by mouth three (3) times daily (with meals). warfarin 5 mg tablet Commonly known as:  COUMADIN We Performed the Following AMB POC EKG ROUTINE W/ 12 LEADS, INTER & REP [13354 CPT(R)] Introducing [facility]! Madelyn Daniel introduces [employer] patient portal. Now you can access parts of your medical record, email your doctor's office, and request medication refills online. 1. In your internet browser, go to [email or web address] 2. Click on the First Time User? Click Here link in the Sign In box. You will see the New Member Sign Up page. 3. Enter your [employer] Access Code exactly as it appears below. You will not need to use this code after youve completed the sign-up process. If you do not sign up before the expiration date, you must request a new code. · [employer] Access Code: [email or web address] Expires: 5/14/2017  4:47 PM 
 
4. Enter the last four digits of your Social Security Number (xxxx) and Date of Birth (mm/dd/yyyy) as indicated and click Submit.  You will be taken to the next sign-up page. 5. Create a [employer] ID. This will be your [employer] login ID and cannot be changed, so think of one that is secure and easy to remember. 6. Create a [employer] password. You can change your password at any time. 7. Enter your Password Reset Question and Answer. This can be used at a later time if you forget your password. 8. Enter your e-mail address. You will receive e-mail notification when new information is available in [address]. 9. Click Sign Up. You can now view and download portions of your medical record. 10. Click the Download Summary menu link to download a portable copy of your medical information. If you have questions, please visit the Frequently Asked Questions section of the [employer] website. Remember, [employer] is NOT to be used for urgent needs. For medical emergencies, dial 911. Now available from your iPhone and Android! Please provide this summary of care documentation to your next provider. Your primary care clinician is listed as Tia Goodwin. If you have any questions after today's visit, please call [phone].

## 2017-04-17 NOTE — PROGRESS NOTES
1. Have you been to the ER, urgent care clinic since your last visit? Hospitalized since your last visit? No     2. Have you seen or consulted any other health care providers outside of the [address] [place] [address] since your last visit? Include any pap smears or colon screening.  No

## 2017-04-17 NOTE — PROGRESS NOTES
HISTORY OF PRESENT ILLNESS  Ned Porter is a 75 y.o. female. Hypertension       Patient presents for a follow-up office visit. Patient has a past medical history significant for hypertension, hypertensive cardiovascular disease, and end-stage renal disease, now on hemodialysis. The patient was found to have frequent episodes of type II second-degree AV block  on a 30 day event monitor. The patient also has frequent 2-1 AV block and reports a near syncopal episode last week associated with bradycardia. Patient subsequently underwent implantation of a dual-chamber permanent pacemaker in October 2014. The patient was last seen in the office over a year ago. Since that time, she reports occasional dizzy spells and episodes of hypotension while on hemodialysis, but this has never happened when she is not on dialysis. Patient denies any prolonged palpitations. No syncope or near syncope. She has not had any chest pain or pressure. She does get leg swelling from time to time, but this usually improves after dialysis or when she elevates her legs. No orthopnea, no PND. Past Medical History:   Diagnosis Date    Cardiac echocardiogram 09/03/2014    Sm LV cavity. Hyperdynamic LV function. EF 75-80%. No WMA. Severe conc LVH. RVSP 25-30 mmHg. Mod-marked LAE. Mild MR.  Carotid duplex 04/26/2005    No significant occlusive disease bilaterally.  Chronic kidney disease     Hypertension     Murmur 2005    Second degree AV block 2014    Medtronic dual-chamber permanent pacemaker    [facility]roke [facility])       Current Outpatient Prescriptions   Medication Sig Dispense Refill    hydrALAZINE (APRESOLINE) 25 mg tablet Take 50 mg by mouth three (3) times daily.  warfarin (COUMADIN) 5 mg tablet       oxyCODONE-acetaminophen (PERCOCET) 5-325 mg per tablet       metoprolol (LOPRESSOR) 50 mg tablet 50 mg two (2) times a day.       albuterol (PROVENTIL HFA, VENTOLIN HFA, PROAIR HFA) 90 mcg/actuation inhaler Take 2 Puffs by inhalation every four (4) hours as needed for Wheezing.  ondansetron hcl (ZOFRAN, AS HYDROCHLORIDE,) 4 mg tablet Take 2 tablets by mouth every eight (8) hours as needed for Nausea. 12 tablet 0    sevelamer carbonate (RENVELA) 800 mg tab tab Take 1 Tab by mouth three (3) times daily (with meals). 90 Tab 0       No Known Allergies     Social History   Substance Use Topics    Smoking status: Never Smoker    Smokeless tobacco: Never Used    Alcohol use No            Review of Systems   Constitutional: Negative for chills, fever and weight loss. HENT: Negative for nosebleeds. Eyes: Negative for blurred vision and double vision. Respiratory: Negative for cough and wheezing. Cardiovascular: Negative for palpitations, orthopnea, claudication, leg swelling and PND. Gastrointestinal: Negative for heartburn, nausea and vomiting. Genitourinary: Negative for dysuria and hematuria. Musculoskeletal: Negative for falls and myalgias. Skin: Negative for rash. Neurological: Negative for dizziness and focal weakness. Endo/Heme/Allergies: Does not bruise/bleed easily. Psychiatric/Behavioral: Negative for substance abuse. Visit Vitals    /70    Pulse 82    Ht 5' 7\" (1.702 m)    Wt 76.2 kg (168 lb)    BMI 26.31 kg/m2      Physical Exam   Constitutional: She is oriented to person, place, and time. She appears well-developed and well-nourished. HENT:   Head: Normocephalic and atraumatic. Eyes: Conjunctivae are normal.   Neck: Neck supple. No JVD present. Carotid bruit is not present. Cardiovascular: Normal rate, regular rhythm, S1 normal, S2 normal and normal pulses. Exam reveals no gallop. Murmur heard. Holosystolic murmur is present   Pulmonary/Chest: Effort normal and breath sounds normal. She has no wheezes. She has no rales. Abdominal: Soft. Bowel sounds are normal. There is no tenderness. Musculoskeletal: She exhibits no edema. Neurological: She is alert and oriented to person, place, and time. Skin: Skin is warm and dry. EKG: Normal sinus rhythm, prolonged first-degree A-V block, intrinsic QRS with right bundle branch block morphology and nonspecific ST-T changes, demand ventricular pacing, left anterior fascicular block, frequent atrial premature contractions. Compared to previous EKG, frequent APCs are now present. Pacemaker interrogation. Battery life estimated at 11 years. Pacing in the atrium 40% in the ventricle 35%. No significant arrhythmias. Normal device function. Scanned document for details. ASSESSMENT and PLAN    Hypertensive cardiovascular disease. Volume status is now well controlled with dialysis. Her blood pressure is also reasonably well controlled on her current regimen which includes metoprolol and hydralazine. High-grade AV block. Patient with documented Mobitz type II, second-degree AV block and 2:1 AV block with correlating symptoms of near syncope. Status post dual-chamber permanent pacemaker In 2014. Normal device function on interrogation today. Patient's battery remains at the beginning of life. End-stage renal disease. Patient started on hemodialysis in 2014. Upper extremity DVT. Patient is now on warfarin therapy. [employer] checks every 3 months. Followup in the office annually, sooner if needed.

## 2017-09-21 PROBLEM — T82.49XA CLOTTED DIALYSIS ACCESS (HCC): Status: ACTIVE | Noted: 2017-01-01

## 2017-09-21 PROBLEM — T82.868A ARTERIOVENOUS FISTULA THROMBOSIS (HCC): Status: ACTIVE | Noted: 2017-01-01

## 2017-09-21 NOTE — ED TRIAGE NOTES
Pt was at dialysis when her AV access site began bleeding. Unable to control bleeding. Pt arrives with a BP cuff to control bleeding. Radial pulse intact, pt c/o cool fingers/ tingling.

## 2017-09-21 NOTE — IP AVS SNAPSHOT
Sai Wyatt 
 
 
 [address] [facility] [address] [place] [address] Patient: Ama Butt MRN: [id number] Current Discharge Medication List  
  
CONTINUE these medications which have NOT CHANGED Dose & Instructions Dispensing Information Comments Morning Noon Evening Bedtime  
 albuterol 90 mcg/actuation inhaler Commonly known as:  PROVENTIL HFA, VENTOLIN HFA, PROAIR HFA Your last dose was: Your next dose is:    
   
   
 Dose:  2 Puff Take 2 Puffs by inhalation every four (4) hours as needed for Wheezing. Refills:  0  
     
   
   
   
  
 hydrALAZINE 25 mg tablet Commonly known as:  APRESOLINE Your last dose was: Your next dose is:    
   
   
 Dose:  50 mg Take 50 mg by mouth three (3) times daily. Refills:  0  
     
   
   
   
  
 metoprolol tartrate 50 mg tablet Commonly known as:  LOPRESSOR Your last dose was: Your next dose is:    
   
   
 Dose:  50 mg  
50 mg two (2) times a day. Refills:  0  
     
   
   
   
  
 ondansetron hcl 4 mg tablet Commonly known as:  ZOFRAN (AS HYDROCHLORIDE) Your last dose was: Your next dose is:    
   
   
 Dose:  8 mg Take 2 tablets by mouth every eight (8) hours as needed for Nausea. Quantity:  12 tablet Refills:  0  
     
   
   
   
  
 oxyCODONE-acetaminophen 5-325 mg per tablet Commonly known as:  PERCOCET Your last dose was: Your next dose is:    
   
   
  Refills:  0  
     
   
   
   
  
 sevelamer carbonate 800 mg Tab tab Commonly known as:  Tesha [place] Your last dose was: Your next dose is:    
   
   
 Dose:  800 mg Take 1 Tab by mouth three (3) times daily (with meals). Quantity:  90 Tab Refills:  0  
     
   
   
   
  
 warfarin 5 mg tablet Commonly known as:  COUMADIN Your last dose was: Your next dose is:    
   
   
  Refills:  0

## 2017-09-21 NOTE — H&P
Hospitalist Admission Note    NAME: Ama Butt   :  1950   MRN:  [id number]     Date/Time of admission:  2017 3:50 PM    Patient PCP: Srinivas Karimi MD  ________________________________________________________________________    My assessment of this patient's clinical condition and my plan of care is as follows. Assessment / Plan:  1. Acute fistula thrombosis  2. ESRD, on HD TTS  3. Benign essential HTN  4. H/o CVA  5. H/o Second degree AVB s/p PPM  6. Anemia of chronic disease    1. Admit to medsurg  2. Nephrology on board for HD  3. Vascular consulted for possible thrombectomy  4. Defer to nephrology for need of anticoagulation - was on coumadin, according to med list, but INR 1.1  5. Otherwise, continue medical management from outpt regime  6. Observation    Code Status: full  Surrogate Decision Maker: patient    DVT Prophylaxis: sc hep  GI Prophylaxis: not indicated          Subjective:   CHIEF COMPLAINT:   Fistula malfunction    HISTORY OF PRESENT ILLNESS:     Maxi Staley is a 75 y.o.  female who presents with inability to effectively access her AVF during HD. Pt has h/o ESRD and HTN who was at HD on today and staff could not access her AVF and it began to bleed uncontrollably, per report. Pt was brought tot he ED and bleeding was controlled. Nephrology was called and fistula was imaged and noted to have an acute occlusive clot within. Vascular has been consulted and nephrology is awaiting appropriate access for completion of HD on today. Of note, med list includes coumadin, but INR 1.1 on today. We were asked to admit for work up and evaluation of the above problems. Past Medical History:   Diagnosis Date    Cardiac echocardiogram 2014    Sm LV cavity. Hyperdynamic LV function. EF 75-80%. No WMA. Severe conc LVH. RVSP 25-30 mmHg. Mod-marked LAE. Mild MR.  Carotid duplex 2005    No significant occlusive disease bilaterally.  Chronic kidney disease     Hypertension     Murmur 2005    Second degree AV block 2014    Medtronic dual-chamber permanent pacemaker    Stroke [facility])         Past Surgical History:   Procedure Laterality Date    HX TUBAL LIGATION         Social History   Substance Use Topics    Smoking status: Never Smoker    Smokeless tobacco: Never Used    Alcohol use No        Family History   Problem Relation Age of Onset    Hypertension Mother      No Known Allergies     Prior to Admission medications    Medication Sig Start Date End Date Taking? Authorizing Provider   hydrALAZINE (APRESOLINE) 25 mg tablet Take 50 mg by mouth three (3) times daily. Historical Provider   warfarin (COUMADIN) 5 mg tablet  2/11/16   Historical Provider   oxyCODONE-acetaminophen (PERCOCET) 5-325 mg per tablet  11/25/15   Historical Provider   metoprolol (LOPRESSOR) 50 mg tablet 50 mg two (2) times a day. Historical Provider   albuterol (PROVENTIL HFA, VENTOLIN HFA, PROAIR HFA) 90 mcg/actuation inhaler Take 2 Puffs by inhalation every four (4) hours as needed for Wheezing. Historical Provider   ondansetron hcl (ZOFRAN, AS HYDROCHLORIDE,) 4 mg tablet Take 2 tablets by mouth every eight (8) hours as needed for Nausea. 2/3/15   Rl Lerma MD   sevelamer carbonate (RENVELA) 800 mg tab tab Take 1 Tab by mouth three (3) times daily (with meals). 6/28/14   Jovanna Baird MD       REVIEW OF SYSTEMS:     I am not able to complete the review of systems because:    The patient is intubated and sedated   x The patient has altered mental status due to his acute medical problems    The patient has baseline aphasia from prior stroke(s)    The patient has baseline dementia and is not reliable historian    The patient is in acute medical distress and unable to provide information           Total of 12 systems reviewed as follows:       POSITIVE= bolded text  Negative = text not underlined  General:  fever, chills, sweats, generalized weakness, weight loss/gain,      loss of appetite   Eyes:    blurred vision, eye pain, loss of vision, double vision  ENT:    rhinorrhea, pharyngitis   Respiratory:   cough, sputum production, SOB, [place], wheezing, pleuritic pain   Cardiology:   chest pain, palpitations, orthopnea, PND, edema, syncope   Gastrointestinal:  abdominal pain , N/V, diarrhea, dysphagia, constipation, bleeding   Genitourinary:  frequency, urgency, dysuria, hematuria, incontinence   Muskuloskeletal :  arthralgia, myalgia, back pain  Hematology:  easy bruising, nose or gum bleeding, lymphadenopathy   Dermatological: rash, ulceration, pruritis, color change / jaundice  Endocrine:   hot flashes or polydipsia   Neurological:  headache, dizziness, confusion, focal weakness, paresthesia,     Speech difficulties, memory loss, gait difficulty  Psychological: Feelings of anxiety, depression, agitation    Objective:   VITALS:    Visit Vitals    /60    Pulse (!) 53    Temp 98.1 °F (36.7 °C)    Resp 30    Ht 5' 6\" (1.676 m)    Wt 79.9 kg (176 lb 2.4 oz)    SpO2 94%    BMI 28.43 kg/m2       PHYSICAL EXAM:    General:    Somnolent but able to be aroused with loud verbal cues, cooperative, no distress, appears stated age, sleeping postoperatively. HEENT: Atraumatic, anicteric sclerae, pink conjunctivae     No oral ulcers, mucosa moist, throat clear, dentition poor  Neck:  Supple, symmetrical,  thyroid: non tender  Lungs:   Clear to auscultation bilaterally. No Wheezing or Rhonchi. No rales. Chest wall:  No tenderness  No Accessory muscle use. Heart:   Regular  rhythm,  No  murmur   No edema  Abdomen:   Soft, non-tender. Not distended. Bowel sounds normal  Extremities: No cyanosis. No clubbing,      Skin turgor normal, Capillary refill normal, Radial dial pulse 2+  Skin:     Not pale. Not Jaundiced  No rashes   Psych:  Good insight. Not depressed. Not anxious or agitated. Neurologic: EOMs intact. No facial asymmetry.  No aphasia or slurred speech. Symmetrical strength, Sensation grossly intact. Alert and oriented X 4.     _______________________________________________________________________  Care Plan discussed with:    Comments   Patient x    Family      RN x    Care Manager                    Consultant:      _______________________________________________________________________  Expected  Disposition:   Home with Family x   HH/PT/OT/RN    SNF/LTC    MOIÉSS    ________________________________________________________________________  TOTAL TIME:  48 Minutes    Critical Care Provided     Minutes non procedure based      Comments    x Reviewed previous records   >50% of visit spent in counseling and coordination of care x Discussion with patient and/or family and questions answered       ________________________________________________________________________      Procedures: see electronic medical records for all procedures/Xrays and details which were not copied into this note but were reviewed prior to creation of Plan. LAB DATA REVIEWED:    Recent Results (from the past 24 hour(s))   CBC WITH AUTOMATED DIFF    Collection Time: 09/21/17  9:50 AM   Result Value Ref Range    WBC 5.1 4.6 - 13.2 K/uL    RBC 3.12 (L) 4.20 - 5.30 M/uL    HGB 10.4 (L) 12.0 - 16.0 g/dL    HCT 35.8 35.0 - 45.0 %    .7 (H) 74.0 - 97.0 FL    MCH 33.3 24.0 - 34.0 PG    MCHC 29.1 (L) 31.0 - 37.0 g/dL    RDW 13.8 11.6 - 14.5 %    PLATELET 476 (L) 964 - 420 K/uL    MPV 12.0 (H) 9.2 - 11.8 FL    NEUTROPHILS 73 40 - 73 %    LYMPHOCYTES 18 (L) 21 - 52 %    MONOCYTES 7 3 - 10 %    EOSINOPHILS 2 0 - 5 %    BASOPHILS 0 0 - 2 %    ABS. NEUTROPHILS 3.7 1.8 - 8.0 K/UL    ABS. LYMPHOCYTES 0.9 0.9 - 3.6 K/UL    ABS. MONOCYTES 0.3 0.05 - 1.2 K/UL    ABS. EOSINOPHILS 0.1 0.0 - 0.4 K/UL    ABS.  BASOPHILS 0.0 0.0 - 0.06 K/UL    DF AUTOMATED     METABOLIC PANEL, COMPREHENSIVE    Collection Time: 09/21/17  9:50 AM   Result Value Ref Range    Sodium 141 136 - 145 mmol/L Potassium 4.5 3.5 - 5.5 mmol/L    Chloride 108 100 - 108 mmol/L    CO2 25 21 - 32 mmol/L    Anion gap 8 3.0 - 18 mmol/L    Glucose 84 74 - 99 mg/dL    BUN 70 (H) 7.0 - 18 MG/DL    Creatinine 8.16 (H) 0.6 - 1.3 MG/DL    BUN/Creatinine ratio 9 (L) 12 - 20      GFR est AA 6 (L) >60 ml/min/1.73m2    GFR est non-AA 5 (L) >60 ml/min/1.73m2    Calcium 8.4 (L) 8.5 - 10.1 MG/DL    Bilirubin, total 0.4 0.2 - 1.0 MG/DL    ALT (SGPT) 11 (L) 13 - 56 U/L    AST (SGOT) 7 (L) 15 - 37 U/L    Alk.  phosphatase 173 (H) 45 - 117 U/L    Protein, total 7.2 6.4 - 8.2 g/dL    Albumin 3.2 (L) 3.4 - 5.0 g/dL    Globulin 4.0 2.0 - 4.0 g/dL    A-G Ratio 0.8 0.8 - 1.7     PROTHROMBIN TIME + INR    Collection Time: 09/21/17  9:50 AM   Result Value Ref Range    Prothrombin time 13.1 11.5 - 15.2 sec    INR 1.0 0.8 - 1.2         Haleigh Devi MD  Internal Medicine  Hospitalist Division

## 2017-09-21 NOTE — PROCEDURES
[facility]  *** [facility] ***    Name: Lisbeth Cuevas  MRN: [id number]  : 30 Mar 1950  HIS Order #: [id number]  [id number] [facility] Visit #: [id number]  Date: 21 Sep 2017    TYPE OF TEST: Extremity Arterial Duplex    REASON FOR TEST  Continued bleeding from left arm AV graft after dialysis cannulation. Right                     Left  Artery               PSV   Finding             PSV   Finding  ------------------  -----  ---------------    -----  ---------------  Subclavian:  Axillary:                                      94.0  Brachial:                                      72.0  Radial:                                        73.0  Ulnar:                                         34.0    Digit pressures:      R:        L:  Wrist/brachial index: R:        L:      INTERPRETATION/FINDINGS  Duplex images were obtained using 2-D gray scale, color flow, and  spectral Doppler analysis. Left arm :  1. No significant occlusive disease in the arteries examined. 2. Acute, nearly occlusive thrombus seen in the AV graft from the  distal arm to the venous anastamosis. ADDITIONAL COMMENTS  Results called to Dr. Brianne Grajeda in the ER. I have personally reviewed the data relevant to the interpretation of  this  study. TECHNOLOGIST: KEVEN Juarez, DARREN  Signed: 2017 02:12 PM    PHYSICIAN: Arya Jean D.O.   Signed: 2017 08:53 AM

## 2017-09-21 NOTE — IP AVS SNAPSHOT
Kevin Ya 
 
 
 [address] [facility] [address] [facility] Patient: Ezekiel Juares MRN: [id number] QME:6/22/9816 You are allergic to the following No active allergies Recent Documentation Height Weight Breastfeeding? BMI OB Status Smoking Status 1.676 m 79.9 kg No 28.43 kg/m2 Postmenopausal Never Smoker Emergency Contacts Name Discharge Info Relation Home Work Mobile Christie Soto DISCHARGE CAREGIVER [3] Child [2] [phone] Sivan Record  Child [2]   [phone] About your hospitalization You were admitted on:  September 21, 2017 You last received care in the:  [facility] [address] You were discharged on:  September 22, 2017 Unit phone number:  [phone] Why you were hospitalized Your primary diagnosis was:  Not on File Your diagnoses also included:  Clotted Dialysis Access (Hcc), Arteriovenous Fistula Thrombosis (Hcc) Providers Seen During Your Hospitalizations Provider Role Specialty Primary office phone Jada Brush MD Attending Provider Emergency Medicine [phone] Arline Cooper MD Attending Provider Internal Medicine [phone] Your Primary Care Physician (PCP) Primary Care Physician Office Phone Office Fax [address] [phone] Follow-up Information Follow up With Details Comments Contact Info Gurjit Blackwood MD  Doctor sees patient out of town, will contact patient and set up home visit upon his return monday. [address] [facility] [address] [place] [id number] [phone] Current Discharge Medication List  
  
CONTINUE these medications which have NOT CHANGED Dose & Instructions Dispensing Information Comments Morning Noon Evening Bedtime  
 albuterol 90 mcg/actuation inhaler Commonly known as:  PROVENTIL HFA, VENTOLIN HFA, PROAIR HFA  
   
 Your last dose was: Your next dose is:    
   
   
 Dose:  2 Puff Take 2 Puffs by inhalation every four (4) hours as needed for Wheezing. Refills:  0  
     
   
   
   
  
 hydrALAZINE 25 mg tablet Commonly known as:  APRESOLINE Your last dose was: Your next dose is:    
   
   
 Dose:  50 mg Take 50 mg by mouth three (3) times daily. Refills:  0  
     
   
   
   
  
 metoprolol tartrate 50 mg tablet Commonly known as:  LOPRESSOR Your last dose was: Your next dose is:    
   
   
 Dose:  50 mg  
50 mg two (2) times a day. Refills:  0  
     
   
   
   
  
 ondansetron hcl 4 mg tablet Commonly known as:  ZOFRAN (AS HYDROCHLORIDE) Your last dose was: Your next dose is:    
   
   
 Dose:  8 mg Take 2 tablets by mouth every eight (8) hours as needed for Nausea. Quantity:  12 tablet Refills:  0  
     
   
   
   
  
 oxyCODONE-acetaminophen 5-325 mg per tablet Commonly known as:  PERCOCET Your last dose was: Your next dose is:    
   
   
  Refills:  0  
     
   
   
   
  
 sevelamer carbonate 800 mg Tab tab Commonly known as:  Nathalia Hickman Your last dose was: Your next dose is:    
   
   
 Dose:  800 mg Take 1 Tab by mouth three (3) times daily (with meals). Quantity:  90 Tab Refills:  0  
     
   
   
   
  
 warfarin 5 mg tablet Commonly known as:  COUMADIN Your last dose was: Your next dose is:    
   
   
  Refills:  0 Discharge Instructions Patient armband removed and shredded Videostirhart Activation Thank you for requesting access to Acteavo. Please follow the instructions below to securely access and download your online medical record. Acteavo allows you to send messages to your doctor, view your test results, renew your prescriptions, schedule appointments, and more. How Do I Sign Up? 1. In your internet browser, go to [email or web address] 
2. Click on the First Time User? Click Here link in the Sign In box. You will be redirect to the New Member Sign Up page. 3. Enter your AnonymAsk Access Code exactly as it appears below. You will not need to use this code after youve completed the sign-up process. If you do not sign up before the expiration date, you must request a new code. AnonymAsk Access Code: [email or web address] Expires: 2017 10:24 AM (This is the date your AnonymAsk access code will ) 4. Enter the last four digits of your Social Security Number (xxxx) and Date of Birth (mm/dd/yyyy) as indicated and click Submit. You will be taken to the next sign-up page. 5. Create a AnonymAsk ID. This will be your AnonymAsk login ID and cannot be changed, so think of one that is secure and easy to remember. 6. Create a AnonymAsk password. You can change your password at any time. 7. Enter your Password Reset Question and Answer. This can be used at a later time if you forget your password. 8. Enter your e-mail address. You will receive e-mail notification when new information is available in [address] 9. Click Sign Up. You can now view and download portions of your medical record. 10. Click the Download Summary menu link to download a portable copy of your medical information. Additional Information If you have questions, please visit the Frequently Asked Questions section of the AnonymAsk website at [email or web address] Remember, AnonymAsk is NOT to be used for urgent needs. For medical emergencies, dial 911. DISCHARGE SUMMARY from Nurse The following personal items are in your possession at time of discharge: 
 
Dental Appliances: None Visual Aid: Glasses, At bedside Home Medications: None Jewelry: Michela Sonia Clothing: Other (comment) ([address]) Other Valuables: [employer] Personal Items Sent to Safe: Wallet and earings PATIENT INSTRUCTIONS: 
 
 
F-face looks uneven A-arms unable to move or move unevenly S-speech slurred or non-existent T-time-call 911 as soon as signs and symptoms begin-DO NOT go Back to bed or wait to see if you get better-TIME IS BRAIN. Warning Signs of HEART ATTACK Call 911 if you have these symptoms: 
? Chest discomfort. Most heart attacks involve discomfort in the center of the chest that lasts more than a few minutes, or that goes away and comes back. It can feel like uncomfortable pressure, squeezing, fullness, or pain. ? Discomfort in other areas of the upper body. Symptoms can include pain or discomfort in one or both arms, the back, neck, jaw, or stomach. ? Shortness of breath with or without chest discomfort. ? Other signs may include breaking out in a cold sweat, nausea, or lightheadedness. Don't wait more than five minutes to call [address]! Fast action can save your life. Calling 911 is almost always the fastest way to get lifesaving treatment. Emergency Medical Services staff can begin treatment when they arrive  up to an hour sooner than if someone gets to the hospital by car. The discharge information has been reviewed with the patient. The patient verbalized understanding. Discharge medications reviewed with the patient and appropriate educational materials and side effects teaching were provided. Discharge Orders None Introducing [facility]!    
 Giles Reese introduces [employer] patient portal. Now you can access parts of your medical record, email your doctor's office, and request medication refills online. 1. In your internet browser, go to [email or web address] 2. Click on the First Time User? Click Here link in the Sign In box. You will see the New Member Sign Up page. 3. Enter your GLSS Access Code exactly as it appears below. You will not need to use this code after youve completed the sign-up process. If you do not sign up before the expiration date, you must request a new code. · GLSS Access Code: [email or web address] Expires: 12/21/2017 10:24 AM 
 
4. Enter the last four digits of your Social Security Number (xxxx) and Date of Birth (mm/dd/yyyy) as indicated and click Submit. You will be taken to the next sign-up page. 5. Create a GLSS ID. This will be your GLSS login ID and cannot be changed, so think of one that is secure and easy to remember. 6. Create a GLSS password. You can change your password at any time. 7. Enter your Password Reset Question and Answer. This can be used at a later time if you forget your password. 8. Enter your e-mail address. You will receive e-mail notification when new information is available in [address] 9. Click Sign Up. You can now view and download portions of your medical record. 10. Click the Download Summary menu link to download a portable copy of your medical information. If you have questions, please visit the Frequently Asked Questions section of the GLSS website. Remember, GLSS is NOT to be used for urgent needs. For medical emergencies, dial 911. Now available from your iPhone and Android! General Information Please provide this summary of care documentation to your next provider. Patient Signature:  ____________________________________________________________ Date:  ____________________________________________________________  
  
Connye Brod Provider Signature:  ____________________________________________________________ Date:  ____________________________________________________________

## 2017-09-21 NOTE — ED NOTES
Pt states she wants to leave. Provider notified. Pt sitting up awake, alert, no bleeding to site noted.

## 2017-09-21 NOTE — CONSULTS
Consult requested by: Dr. Davis Sanders. Veronica Damico is a 75 y.o. female [address] who is being seen on consult for ESRD management . Chief Complaint   Patient presents with    Vascular Access Problem     Admission diagnosis: vascular access    HPI:  69y F with ESRD, HTN, was sent from HD unit as she had bleeding from acess after canulation. In ER her bleeding was secured, no hemodynamic instability. Her USG shows clotted access, admitted for vascular eval and possible intervention. Nephrology service consulted for ESRD management. She denies for any short of breath, nausea, vomiting, chest pain. Her access was created just a year ago, her last fistulogram was around last thanks giving. Patient goes to dialysis unit at [facility] on TTS schedule. Last dialysis was on Tuesday . I have discussed with dialysis unit staff. Past Medical History:   Diagnosis Date    Cardiac echocardiogram 09/03/2014    Sm LV cavity. Hyperdynamic LV function. EF 75-80%. No WMA. Severe conc LVH. RVSP 25-30 mmHg. Mod-marked LAE. Mild MR.  Carotid duplex 04/26/2005    No significant occlusive disease bilaterally.  Chronic kidney disease     Hypertension     Murmur 2005    Second degree AV block 2014    Medtronic dual-chamber permanent pacemaker    Stroke [facility])       Past Surgical History:   Procedure Laterality Date    HX TUBAL LIGATION         Social History     Social History    Marital status: SINGLE     Spouse name: N/A    Number of children: N/A    Years of education: N/A     Occupational History    Not on file.      Social History Main Topics    Smoking status: Never Smoker    Smokeless tobacco: Never Used    Alcohol use No    Drug use: Not on file    Sexual activity: No     Other Topics Concern    Not on file     Social History Narrative       Family History   Problem Relation Age of Onset    Hypertension Mother      No Known Allergies     Home Medications: Prior to Admission Medications   Prescriptions Last Dose Informant Patient Reported? Taking? albuterol (PROVENTIL HFA, VENTOLIN HFA, PROAIR HFA) 90 mcg/actuation inhaler   Yes No   Sig: Take 2 Puffs by inhalation every four (4) hours as needed for Wheezing. hydrALAZINE (APRESOLINE) 25 mg tablet   Yes No   Sig: Take 50 mg by mouth three (3) times daily. metoprolol (LOPRESSOR) 50 mg tablet   Yes No   Si mg two (2) times a day. ondansetron hcl (ZOFRAN, AS HYDROCHLORIDE,) 4 mg tablet   No No   Sig: Take 2 tablets by mouth every eight (8) hours as needed for Nausea. oxyCODONE-acetaminophen (PERCOCET) 5-325 mg per tablet   Yes No   sevelamer carbonate (RENVELA) 800 mg tab tab   No No   Sig: Take 1 Tab by mouth three (3) times daily (with meals). warfarin (COUMADIN) 5 mg tablet   Yes No      Facility-Administered Medications: None       No current facility-administered medications for this encounter. Current Outpatient Prescriptions   Medication Sig    hydrALAZINE (APRESOLINE) 25 mg tablet Take 50 mg by mouth three (3) times daily.  warfarin (COUMADIN) 5 mg tablet     oxyCODONE-acetaminophen (PERCOCET) 5-325 mg per tablet     metoprolol (LOPRESSOR) 50 mg tablet 50 mg two (2) times a day.  albuterol (PROVENTIL HFA, VENTOLIN HFA, PROAIR HFA) 90 mcg/actuation inhaler Take 2 Puffs by inhalation every four (4) hours as needed for Wheezing.  ondansetron hcl (ZOFRAN, AS HYDROCHLORIDE,) 4 mg tablet Take 2 tablets by mouth every eight (8) hours as needed for Nausea.  sevelamer carbonate (RENVELA) 800 mg tab tab Take 1 Tab by mouth three (3) times daily (with meals). Review of Systems:     Complete 10-point review of systems were obtained and discussed in length  with the patient. Complete review of systems was negative/unremarkable  except as mentioned in HPI section.   Data Review:    Labs: Results:       Chemistry Recent Labs      17   0950   GLU  84   NA  141   K  4.5   CL  108 CO2  25   BUN  70*   CREA  8.16*   CA  8.4*   AGAP  8   BUCR  9*   AP  173*   TP  7.2   ALB  3.2*   GLOB  4.0   AGRAT  0.8      CBC w/Diff Recent Labs      09/21/17   0950   WBC  5.1   RBC  3.12*   HGB  10.4*   HCT  35.8   PLT  105*   GRANS  73   LYMPH  18*   EOS  2      Coagulation Recent Labs      09/21/17   0950   PTP  13.1   INR  1.0       Iron/Ferritin No results for input(s): IRON in the last 72 hours. No lab exists for component: TIBCCALC   BNP No results for input(s): BNPP in the last 72 hours. Cardiac Enzymes No results for input(s): CPK, CKND1, BONI in the last 72 hours. No lab exists for component: CKRMB, TROIP   Liver Enzymes Recent Labs      09/21/17   0950   TP  7.2   ALB  3.2*   AP  173*   SGOT  7*      Thyroid Studies Lab Results   Component Value Date/Time    TSH 0.89 09/03/2014 07:40 AM            Physical Assessment:     Visit Vitals    /60    Pulse (!) 53    Temp 98.1 °F (36.7 °C)    Resp 30    Ht 5' 6\" (1.676 m)    Wt 79.9 kg (176 lb 2.4 oz)    SpO2 94%    BMI 28.43 kg/m2     Weight change:   No intake or output data in the 24 hours ending 09/21/17 1447  Physical Exam  General: comfortable, no acute distress   HEENT sclera anicteric, supple neck, no thyromegaly  CVS: S1S2 heard,  no rub  RS: + air entry b/l,   Abd: Soft, Non tender,  Neuro:  A XO x 3bed bound   Extrm: ++edema, no cyanosis, clubbing   Skin: no visible  Rash  Access; lert arm graft , pulsating  Procedures/imaging: see electronic medical records for all procedures, Xrays and details which were not copied into this note but were reviewed prior to creation of Plan      Impression & Plan:   IMPRESSION:   ESRD, TTS, due for HD today did not received HD because of clotted access  Access; left arm AVG, clotted, reviewed USG  Anemia , on epo  Elevated phos, on binders  HTN   PLAN:   Admitted for vascular intervention,awaiting for vascular colleague input  Will arrange HD after fistulogram   Resume phos binders  If patient need IV contrast for CT imaging, need to be coordinated with renal team.   Avoid Gadolinium due to its association with nephrogenic systemic fibrosis in a patients with severe ARF and ESRD.    Please dose all medications for creatinine clearance <15/dialysis.    Avoid blood pressure checks, blood draws, peripheral iv's on arm with access. AvoidPICC lines on either arm in order to preserve veins for dialysis access creation.       Discussed with Dr. Donald Juarez. Thank you very much for allowing me to participate in the care of this patient.     We will continue to monitor with you and make recommendations as needed.           Yashira Barbosa MD  September 21, 2017  [facility]  Office [phone]

## 2017-09-21 NOTE — ANESTHESIA PREPROCEDURE EVALUATION
Anesthetic History     PONV          Review of Systems / Medical History  Patient summary reviewed, nursing notes reviewed and pertinent labs reviewed    Pulmonary  Within defined limits                 Neuro/Psych       CVA       Cardiovascular    Hypertension: well controlled        Dysrhythmias : atrial fibrillation  Pacemaker    Exercise tolerance: <4 METS     GI/Hepatic/Renal         Renal disease: ESRD and dialysis       Endo/Other        Morbid obesity     Other Findings   Comments: Risk Factors for Postoperative nausea/vomiting:YES       History of postoperative nausea/vomiting? Female? YES       Motion sickness? NO       Intended opioid administration for postoperative analgesia? NO      Smoking Abstinence  Current Smoker? NO  Elective Surgery? NO  Seen preoperatively by anesthesiologist or proxy prior to day of surgery? YES  Pt abstained from smoking 24 hours prior to anesthesia?  N/A         Physical Exam    Airway  Mallampati: IV  TM Distance: 4 - 6 cm  Neck ROM: short neck   Mouth opening: Diminished (comment)     Cardiovascular    Rhythm: irregular           Dental    Dentition: Poor dentition     Pulmonary  Breath sounds clear to auscultation               Abdominal  GI exam deferred       Other Findings            Anesthetic Plan    ASA: 4  Anesthesia type: MAC            Anesthetic plan and risks discussed with: Patient

## 2017-09-21 NOTE — ED NOTES
No bleeding noted. Dressing removed by provider. Discolored area to fistula. Two small needle gill observed. Bandaid applied. Awaiting vascular. Pt denies any needs at this time. Call bell placed and pt instructed to notify nurse if any bleeding is observed.

## 2017-09-21 NOTE — ED PROVIDER NOTES
HPI Comments: 9:23 AM Maye Echevarria is a 75 y.o. female with h/o HTN, CKD, Murmur,and Stroke who presents to ED complaining of constant uncontrolled bleeding from dialysis AV access on upper left arm onset today. Patient was at dialysis when the bleeding began. EMS personnel placed a BP cuff on the sight to control the bleeding. Patient states that her fingers are cold and tingly. Her lower av access is fine. Denies numbness. No other concerns or symptoms at this time. PCP: Madisyn Barajas MD      The history is provided by the patient. Past Medical History:   Diagnosis Date    Cardiac echocardiogram 09/03/2014    Sm LV cavity. Hyperdynamic LV function. EF 75-80%. No WMA. Severe conc LVH. RVSP 25-30 mmHg. Mod-marked LAE. Mild MR.  Carotid duplex 04/26/2005    No significant occlusive disease bilaterally.  Chronic kidney disease     Hypertension     Murmur 2005    Second degree AV block 2014    Medtronic dual-chamber permanent pacemaker    Stroke [facility])        Past Surgical History:   Procedure Laterality Date    HX TUBAL LIGATION           Family History:   Problem Relation Age of Onset    Hypertension Mother        Social History     Social History    Marital status: SINGLE     Spouse name: N/A    Number of children: N/A    Years of education: N/A     Occupational History    Not on file. Social History Main Topics    Smoking status: Never Smoker    Smokeless tobacco: Never Used    Alcohol use No    Drug use: Not on file    Sexual activity: No     Other Topics Concern    Not on file     Social History Narrative         ALLERGIES: Review of patient's allergies indicates no known allergies. Review of Systems   Skin:        Bleeding from left arm AV access   Neurological: Negative for numbness. All other systems reviewed and are negative.       Vitals:    09/21/17 0850   BP: (!) 174/112   Pulse: 74   Resp: 20   Temp: 98.1 °F (36.7 °C)   SpO2: 94%   Weight: 79.9 kg (176 lb 2.4 oz)   Height: 5' 6\" (1.676 m)            Physical Exam   Constitutional: She is oriented to person, place, and time. She appears well-developed. HENT:   Head: Normocephalic and atraumatic. Eyes: EOM are normal. Pupils are equal, round, and reactive to light. Neck: Normal range of motion. Neck supple. Cardiovascular: Normal rate, regular rhythm and normal heart sounds. Exam reveals no friction rub. No murmur heard. Left av fistula with thrill     Pulmonary/Chest: Effort normal and breath sounds normal. No respiratory distress. She has no wheezes. Abdominal: Soft. She exhibits no distension. There is no tenderness. There is no rebound and no guarding. Musculoskeletal: Normal range of motion. Neurological: She is alert and oriented to person, place, and time. Skin: Skin is warm and dry. Psychiatric: She has a normal mood and affect. Her behavior is normal. Thought content normal.        MDM  Number of Diagnoses or Management Options  Diagnosis management comments:  79-year-old female presents with bleeding vascular access. She was at dialysis and had not yet started her dialysis. They could not control the bleeding from the needle stick. EMS was able to control it with a blood pressure cuff. We removed the blood pressure cuff and the gauze underneath it was blood soaked but the bleeding has been controlled. We will observe for further brief period of time and then remove  the gauze. 10:50 AM Patient was taking the bleeding has stopped. There is a slight discoloration probably 1.5 mm x 1 centimeter long; The patient states musculoskeletal patient is discussed with vascular surgery    2:37 PM graft is clotted will admt  D/w dr dEward Ziegler. nephrololgy  D/w dr Sanjay Rojas will admit. Pending discussion with vascular.       the graft had a when I initially saw her and after taking off the blood pressure cuff for compression to stop the bleeding,  Minimal flow as per vascular with chronic clot. ED Course     Progress Notes  9:52 AM Checked on patient and tourniquet has been removed for 30 minutes. Bleeding is controlled. Will continue with observation and take down dressing. Procedures          Scribe Attestation      Naseem Farias acting as a scribe for and in the presence of Taylor Gonzalez MD      September 21, 2017 at 9:23 AM       Provider Attestation:      I personally performed the services described in the documentation, reviewed the documentation, as recorded by the scribe in my presence, and it accurately and completely records my words and actions.  September 21, 2017 at 9:23 AM - Taylor Gonzalez MD

## 2017-09-22 NOTE — DISCHARGE INSTRUCTIONS
Patient armband removed and shredded    MyChart Activation    Thank you for requesting access to [employer]. Please follow the instructions below to securely access and download your online medical record. [employer] allows you to send messages to your doctor, view your test results, renew your prescriptions, schedule appointments, and more. How Do I Sign Up? 1. In your internet browser, go to [email or web address]  2. Click on the First Time User? Click Here link in the Sign In box. You will be redirect to the New Member Sign Up page. 3. Enter your [employer] Access Code exactly as it appears below. You will not need to use this code after youve completed the sign-up process. If you do not sign up before the expiration date, you must request a new code. [employer] Access Code: [email or web address]  Expires: 2017 10:24 AM (This is the date your [employer] access code will )    4. Enter the last four digits of your Social Security Number (xxxx) and Date of Birth (mm/dd/yyyy) as indicated and click Submit. You will be taken to the next sign-up page. 5. Create a [employer]or ID. This will be your [employer] login ID and cannot be changed, so think of one that is secure and easy to remember. 6. Create a [employer] password. You can change your password at any time. 7. Enter your Password Reset Question and Answer. This can be used at a later time if you forget your password. 8. Enter your e-mail address. You will receive e-mail notification when new information is available in [address] 9. Click Sign Up. You can now view and download portions of your medical record. 10. Click the Download Summary menu link to download a portable copy of your medical information. Additional Information    If you have questions, please visit the Frequently Asked Questions section of the [employer] website at [email or web address] Remember, [employer] is NOT to be used for urgent needs.  For medical emergencies, dial 911.      DISCHARGE SUMMARY from Nurse    The following personal items are in your possession at time of discharge:    Dental Appliances: None  Visual Aid: Glasses, At bedside     Home Medications: None  Jewelry: Earrings  Clothing: Other (comment) ([address])  Other Valuables: [address] [place] [address] Items Sent to Safe: Wallet and earings          PATIENT INSTRUCTIONS:    After general anesthesia or intravenous sedation, for 24 hours or while taking prescription Narcotics:  · Limit your activities  · Do not drive and operate hazardous machinery  · Do not make important personal or business decisions  · Do  not drink alcoholic beverages  · If you have not urinated within 8 hours after discharge, please contact your surgeon on call. Report the following to your surgeon:  · Excessive pain, swelling, redness or odor of or around the surgical area  · Temperature over 100.5  · Nausea and vomiting lasting longer than 4 hours or if unable to take medications  · Any signs of decreased circulation or nerve impairment to extremity: change in color, persistent  numbness, tingling, coldness or increase pain  · Any questions        What to do at Home:  Recommended activity: Activity as tolerated    If you experience any of the following symptoms Nausea, vomiting, diarrhea, fever greater than 100.5, dizziness, severe headache, shortness of breath, chest pain, increased pain, please follow up with PCP. *  Please give a list of your current medications to your Primary Care Provider. *  Please update this list whenever your medications are discontinued, doses are      changed, or new medications (including over-the-counter products) are added. *  Please carry medication information at all times in case of emergency situations.           These are general instructions for a healthy lifestyle:    No smoking/ No tobacco products/ Avoid exposure to second hand smoke    Surgeon General's Warning:  Quitting smoking now greatly reduces serious risk to your health. Obesity, smoking, and sedentary lifestyle greatly increases your risk for illness    A healthy diet, regular physical exercise & weight monitoring are important for maintaining a healthy lifestyle    You may be retaining fluid if you have a history of heart failure or if you experience any of the following symptoms:  Weight gain of 3 pounds or more overnight or 5 pounds in a week, increased swelling in our hands or feet or shortness of breath while lying flat in bed. Please call your doctor as soon as you notice any of these symptoms; do not wait until your next office visit. Recognize signs and symptoms of STROKE:    F-face looks uneven    A-arms unable to move or move unevenly    S-speech slurred or non-existent    T-time-call 911 as soon as signs and symptoms begin-DO NOT go       Back to bed or wait to see if you get better-TIME IS BRAIN. Warning Signs of HEART ATTACK     Call 911 if you have these symptoms:   Chest discomfort. Most heart attacks involve discomfort in the center of the chest that lasts more than a few minutes, or that goes away and comes back. It can feel like uncomfortable pressure, squeezing, fullness, or pain.  Discomfort in other areas of the upper body. Symptoms can include pain or discomfort in one or both arms, the back, neck, jaw, or stomach.  Shortness of breath with or without chest discomfort.  Other signs may include breaking out in a cold sweat, nausea, or lightheadedness. Don't wait more than five minutes to call 911 - MINUTES MATTER! Fast action can save your life. Calling 911 is almost always the fastest way to get lifesaving treatment. Emergency Medical Services staff can begin treatment when they arrive -- up to an hour sooner than if someone gets to the hospital by car. The discharge information has been reviewed with the patient. The patient verbalized understanding.     Discharge medications reviewed with the patient and appropriate educational materials and side effects teaching were provided.

## 2017-09-22 NOTE — PROGRESS NOTES
TRANSFER - IN REPORT:    Verbal report received from [address] RN(name) on [employer]  being received from [employer]) for routine progression of care      Report consisted of patients Situation, Background, Assessment and   Recommendations(SBAR). Information from the following report(s) SBAR was reviewed with the receiving nurse. Opportunity for questions and clarification was provided. Assessment completed upon patients arrival to unit and care assumed.

## 2017-09-22 NOTE — PROGRESS NOTES
Back on floor from dialysis. D/c instructions given to patient with verbal understanding of instructions. D/cd off floor via stretcher with paramedics.

## 2017-09-22 NOTE — PROGRESS NOTES
Patient is on observation. NERI and Observation paperwork provided. For HD today prior to DC and to continue regular HD schedule. Stretcher transport to be arranged.

## 2017-09-22 NOTE — ANESTHESIA POSTPROCEDURE EVALUATION
Post-Anesthesia Evaluation and Assessment    Patient: Marlee Lu MRN: [id number]  SSN: [id number]    YOB: 1950  Age: 75 y.o. Sex: female       Cardiovascular Function/Vital Signs  Visit Vitals    /74    Pulse 64    Temp 36.3 °C (97.4 °F)    Resp 14    Ht 5' 6\" (1.676 m)    Wt 79.9 kg (176 lb 2.4 oz)    SpO2 99%    BMI 28.43 kg/m2       Patient is status post MAC anesthesia for Procedure(s):  THROMBECTOMY LEFT ARM FISTULA withR/C-ARM. Nausea/Vomiting: None    Postoperative hydration reviewed and adequate. Pain:  Pain Scale 1: Numeric (0 - 10) (09/21/17 1924)  Pain Intensity 1: 0 (09/21/17 1924)   Managed    Neurological Status:   Neuro (WDL): Within Defined Limits (very drowsy post op but answers appropriately) (09/21/17 1924)   At baseline    Mental Status and Level of Consciousness: Alert and oriented     Pulmonary Status:   O2 Device: Nasal cannula (09/21/17 1954)   Adequate oxygenation and airway patent    Complications related to anesthesia: None    Post-anesthesia assessment completed.  No concerns    Signed By: Lyn Esquivel CRNA     September 21, 2017

## 2017-09-22 NOTE — PERIOP NOTES
TRANSFER - OUT REPORT:    Verbal report given to Magdalena CORMIER on Madie Young  being transferred to  for routine post - op       Report consisted of patients Situation, Background, Assessment and   Recommendations(SBAR). Information from the following report(s) SBAR and MAR was reviewed with the receiving nurse. Lines:   Peripheral IV 10/17/14 Right Antecubital (Active)       Peripheral IV 10/17/14 Right Arm (Active)       Peripheral IV 09/21/17 Right Antecubital (Active)   Site Assessment Clean, dry, & intact 9/21/2017  7:24 PM   Phlebitis Assessment 0 9/21/2017  7:24 PM   Infiltration Assessment 0 9/21/2017  7:24 PM   Dressing Status Clean, dry, & intact 9/21/2017  7:24 PM   Dressing Type Tape;Transparent 9/21/2017  7:24 PM   Hub Color/Line Status Pink;Capped 9/21/2017  7:24 PM   Action Taken Blood drawn 9/21/2017  9:50 AM        Opportunity for questions and clarification was provided.       Patient transported with:   O2 @ 2 liters

## 2017-09-22 NOTE — DIALYSIS
ACUTE HEMODIALYSIS FLOW SHEET    HEMODIALYSIS ORDERS: Physician: Angeli Harden     Dialyzer: Revaclear        Duration: 3.5 hr  BFR: 350   DFR: 600   Dialysate:  Temp 37 K+   2    Ca+  2.5 Na 140 Bicarb 35   Weight:   kg    Bed Scale []     Unable to Obtain []      Dry weight/UF Goal: 1500 Access LUE AVF  Needle Gauge 15    Heparin []  Bolus      Units    [] Hourly       Units    [x]None      Catheter locking solution NA   Pre BP:   104/50    Pulse:     60     Temperature:   97.6  Respirations: 16  Tx: NS       ml/Bolus  Other        [x] N/A   Labs: Pre    Hep B Ag, hep B Ab    Post:        [] N/A   Additional Orders:  Okay to run UF in 90s     [] N/A     [x] [facility] Consent Verified     CATHETER ACCESS: [x]N/A   []Right   []Left   []IJ     []Fem   [] First use X-ray verified     []Tunnel                [] Non Tunneled   []No S/S infection  []Redness  []Drainage []Cultured []Swelling []Pain   []Medical Aseptic Prep Utilized   []Dressing Changed  [] Biopatch  Date:       []Clotted   []Patent   Flows: []Good  []Poor  []Reversed   If access problem,  notified: []Yes    []N/A  Date:           GRAFT/FISTULA ACCESS:  []N/A     []Right     [x]Left     [x]UE     []LE   []AVG   [x]AVF        []Buttonhole    [x]Medical Aseptic Prep Utilized   [x]No S/S infection  []Redness  []Drainage []Cultured []Swelling []Pain    Bruit:   [x] Strong    [] Weak       Thrill :   [x] Strong    [] Weak       Needle Gauge: 15   Length: 1 inch   If access problem,  notified: [x]Yes     []N/A  Date:  9/22/17      Please describe access if present and not used:       GENERAL ASSESSMENT:    LUNGS:  Rate 16 SaO2%        [x] N/A    [x] Clear  [] Coarse  [] Crackles  [] Wheezing        [] Diminished     Location : []RLL   []LLL    []RUL  []CHI   Cough: []Productive  []Dry  [x]N/A   Respirations:  [x]Easy  []Labored   Therapy:  [x]RA  []NC  l/min    Mask: []NRB []Venti       O2%                  []Ventilator  []Intubated  [] Trach  [] BiPaP CARDIAC: []Regular      [x] Irregular   [] Pericardial Rub  [] JVD        []  Monitored  [] Bedside  [] Remotely monitored [] N/A  Rhythm:    EDEMA: [] None  [x]Generalized  [] Pitting [] 1    [] 2    [] 3    [] 4                 [] Facial  [] Pedal  []  UE  [] LE   SKIN:   [x] Warm  [] Hot     [] Cold   [x] Dry     [] Pale   [] Diaphoretic                  [] Flushed  [] Jaundiced  [] Cyanotic  [] Rash  [] Weeping   LOC:    [x] Alert      [x]Oriented:    [x] Person     [x] Place  [x]Time               [] Confused  [] Lethargic  [] Medicated  [] Non-responsive     GI / ABDOMEN:  [] Flat    [x] Distended    [] Soft    [] Firm   []  Obese                             [] Diarrhea  [x] Bowel Sounds  [] Nausea  [] Vomiting       / URINE ASSESSMENT:[] Voiding   [x] Oliguria  [] Anuria   []  Saunders     [] Incontinent    []  Incontinent Brief      []  Bathroom Privileges     PAIN: [x] 0 []1  []2   []3   []4   []5   []6   []7   []8   []9   []10            Scale 0-10  Action/Follow Up:    MOBILITY:  [] Amb    [] Amb/Assist    [x] Bed    [] Wheelchair  [] Stretcher      All Vitals and Treatment Details on Attached [address]: [facility]          Room # [phone]     [] 1st Time Acute  [] Stat  [x] Routine  [] Urgent     [x] Acute Room  []  Bedside  [] ICU/CCU  [] ER   Isolation Precautions:  [x] Dialysis   [] Airborne   [] Contact    [] Reverse   Special Considerations:         [] Blood Consent Verified [x]N/A     ALLERGIES:   [x] NKA          Code Status:  [x] Full Code  [] DNR  [] Other           HBsAg ONLY: Date Drawn 9/22/17         [x]Negative []Positive []Unknown   HBsAb: Date  9/22/17    [x] Susceptible   [] [id number] []Not Drawn  [] Drawn     Current Labs:    Date of Labs:    9/21/17       Today []     Results for Estefania Aparicio (MRN [id number]) as of 9/22/2017 17:54   Ref.  Range 9/21/2017 09:50   WBC Latest Ref Range: 4.6 - 13.2 K/uL 5.1   RBC Latest Ref Range: 4.20 - 5.30 M/uL 3.12 (L)   HGB Latest Ref Range: 12.0 - 16.0 g/dL 10.4 (L)   HCT Latest Ref Range: 35.0 - 45.0 % 35.8   MCV Latest Ref Range: 74.0 - 97.0 .7 (H)   MCH Latest Ref Range: 24.0 - 34.0 PG 33.3   MCHC Latest Ref Range: 31.0 - 37.0 g/dL 29.1 (L)   RDW Latest Ref Range: 11.6 - 14.5 % 13.8   PLATELET Latest Ref Range: 135 - 420 K/uL 105 (L)   MPV Latest Ref Range: 9.2 - 11.8 FL 12.0 (H)   NEUTROPHILS Latest Ref Range: 40 - 73 % 73   LYMPHOCYTES Latest Ref Range: 21 - 52 % 18 (L)   MONOCYTES Latest Ref Range: 3 - 10 % 7   EOSINOPHILS Latest Ref Range: 0 - 5 % 2   BASOPHILS Latest Ref Range: 0 - 2 % 0   DF Latest Units:   AUTOMATED   ABS. NEUTROPHILS Latest Ref Range: 1.8 - 8.0 K/UL 3.7   ABS. LYMPHOCYTES Latest Ref Range: 0.9 - 3.6 K/UL 0.9   ABS. MONOCYTES Latest Ref Range: 0.05 - 1.2 K/UL 0.3   ABS. EOSINOPHILS Latest Ref Range: 0.0 - 0.4 K/UL 0.1   ABS. BASOPHILS Latest Ref Range: 0.0 - 0.06 K/UL 0.0   INR Latest Ref Range: 0.8 - 1.2   1.0   Prothrombin time Latest Ref Range: 11.5 - 15.2 sec 13.1   Sodium Latest Ref Range: 136 - 145 mmol/L 141   Potassium Latest Ref Range: 3.5 - 5.5 mmol/L 4.5   Chloride Latest Ref Range: 100 - 108 mmol/L 108   CO2 Latest Ref Range: 21 - 32 mmol/L 25   Anion gap Latest Ref Range: 3.0 - 18 mmol/L 8   Glucose Latest Ref Range: 74 - 99 mg/dL 84   BUN Latest Ref Range: 7.0 - 18 MG/DL 70 (H)   Creatinine Latest Ref Range: 0.6 - 1.3 MG/DL 8.16 (H)   BUN/Creatinine ratio Latest Ref Range: 12 - 20   9 (L)   Calcium Latest Ref Range: 8.5 - 10.1 MG/DL 8.4 (L)   GFR est non-AA Latest Ref Range: >60 ml/min/1.73m2 5 (L)   GFR est AA Latest Ref Range: >60 ml/min/1.73m2 6 (L)   Bilirubin, total Latest Ref Range: 0.2 - 1.0 MG/DL 0.4   Protein, total Latest Ref Range: 6.4 - 8.2 g/dL 7.2   Albumin Latest Ref Range: 3.4 - 5.0 g/dL 3.2 (L)   Globulin Latest Ref Range: 2.0 - 4.0 g/dL 4.0   A-G Ratio Latest Ref Range: 0.8 - 1.7   0.8   ALT (SGPT) Latest Ref Range: 13 - 56 U/L 11 (L)   AST Latest Ref Range: 15 - 37 U/L 7 (L)   Alk. phosphatase Latest Ref Range: 45 - 117 U/L 173 (H)                                                                                                                                   DIET:  [x] Renal    [] Other     [] NPO     []  Diabetic      PRIMARY NURSE REPORT: First initial/Last name/Title      Pre Dialysis: Marcia Gale RN    Time: 1250      EDUCATION:    [x] Patient [] Other         Knowledge Basis: []None []Minimal [x] Substantial   Barriers to learning  [x]N/A   [x] Access Care     [] S&S of infection     [] Fluid Management     []K+     [x]Procedural    []Albumin     [] Medications     [] Tx Options     [] Transplant     [] Diet     [] Other   Teaching Tools:  [x] Explain  [] Demo  [] Handouts [] Video  Patient response:   [x] Verbalized understanding  [] Teach back  [] Return demonstration [x] Requires follow up   Inappropriate due to            [x] Time Out/Safety Check       [address] Before each treatment:     Machine Number:                   [facility]                                   [x] Unit Machine # 9 with centralized RO                                  [] Portable Machine #1/RO serial # [id number]                                  [] Portable Machine #2/RO serial # [id number]                                  [] Portable Machine #3/RO serial # [id number]                                                                                                       [address] [place] [address]                                  [] Portable Machine #11/RO serial # [id number]                                   [] Portable Machine #12/RO serial # [id number]                                  [] Portable Machine #13/RO serial #  [id number]      Alarm Test:  Pass time 1330         Other:         [x] RO/Machine Log Complete      Temp    37            [x]Extracorporeal Circuit Tested for integrity   Dialysate: pH  7.4 Conductivity: Meter   14     HD Machine   14                  TCD: 13.9  Dialyzer Lot # [id number]            Blood Tubing Lot # [id number]          Saline Lot #  [id number]     CHLORINE TESTING-Before each treatment and every 4 hours    Total Chlorine: [x] less than 0.1 ppm  Time: 1200 4 Hr/2nd Check Time: 1600   (if greater than 0.1 ppm from Primary then every 30 minutes from Secondary)     TREATMENT INITIATION  with Dialysis Precautions:   [x] All Connections Secured                 [x] Saline Line Double Clamped   [x] Venous Parameters Set                  [x] Arterial Parameters Set    [x] Prime Given  250                              [x]Air Foam Detector Engaged      Treatment Initiation Note: Patient arrived to unit alert and oriented x4, positive affect, VSS, in no acute distress. Verified consent. Pt went to vascular yesterday for clotted access. Cannulated pt with difficulty today, drawing clots from fistula despite positive thrill and bruit. Charge nurse Sera  also having difficulty, Dr. Jose Mota notified. Was able to cannulate on another attempt. Treatment initiated and maintained at this time. Will continue to monitor. 1630: Arterial acces no longer functioning, Returned blood, increased goal to compensate and re-cannulated successfully. Will continue to monitor     Medication Dose Volume Route Initials Dialyzer Cleared: [] Good [x] Fair  [] Poor    Blood processed:  64.1 L  UF Removed  1500 Ml    Post Wt:     kg  POst BP:   94/55       Pulse: 66      Respirations: 16  Temperature: 97.7                                   Post Tx Vascular Access: AVF/AVG: Bleeding stopped Art 5 min. Howard. 5 Min   N/A                                   Catheter: Locking solution: Heparin 1:1000 Art. Howard.    N/A                                 Post Assessment:                                    Skin:  [x] Warm  [x] Dry [] Diaphoretic    [] Flushed  [] Pale [] Cyanotic   [facility] Signatures Title Initials  Time Lungs: [x] Clear    [] Course  [] Crackles  [] Wheezing [] Diminished   Denita Carroll RN SN Cardiac: [] Regular   [x] Irregular   [] Monitor  [] N/A  Rhythm:           Edema:  [] None    [x] General     [] Facial   [] Pedal    [] UE    [] LE       Pain: [x]0  []1  []2   []3  []4   []5   []6   []7   []8   []9   []10         Post Treatment Note: Pt completed treatment without complication. All blood returned via AVF. VSS and in no acute distress at handoff. Affect appropriate and no complaints at this time.      POST TREATMENT PRIMARY NURSE HANDOFF REPORT:     First initial/Last name/Title         Post Dialysis: Lance Chavez RN Time:  0297     Abbreviations: AVG-arterial venous graft, AVF-arterial venous fistula, IJ-Internal Jugular, Subcl-Subclavian, Fem-Femoral, Tx-treatment, AP/HR-apical heart rate, DFR-dialysate flow rate, BFR-blood flow rate, AP-arterial pressure, -venous pressure, UF-ultrafiltrate, TMP-transmembrane pressure, Howard-Venous, Art-Arterial, RO-Reverse Osmosis

## 2017-09-22 NOTE — PROGRESS NOTES
conducted an initial consultation and Spiritual Assessment for Marlee Lu, who is a 75 y.o.,female. Patients Primary Language is: [place]. According to the patients EMR Yazidi Affiliation is: [facility].     The reason the Patient came to the hospital is:   Patient Active Problem List    Diagnosis Date Noted    Clotted dialysis access [facility]) 09/21/2017    [facility] 09/21/2017    DVT (deep venous thrombosis) ([facility] 02/17/2016    High-grade AV block 02/17/2016    Essential hypertension 02/17/2016    End-stage renal disease needing dialysis ([facility] 09/29/2015    Vomiting 02/03/2015    Diarrhea 02/03/2015    Abdominal pain 02/03/2015    Cough 02/03/2015    Myalgia 02/03/2015    Gastroenteritis 02/03/2015    HCVD (hypertensive cardiovascular disease) 10/15/2014    Second degree heart block 10/15/2014    Near syncope 09/02/2014    Renal failure 06/26/2014    UTI (urinary tract infection) 06/26/2014    Anemia requiring transfusions 06/26/2014    End stage renal failure untreated by renal replacement therapy ([facility] 06/26/2014        The  provided the following Interventions:  Initiated a relationship of care and support. Explored issues of trey, belief, spirituality and Jew/ritual needs while hospitalized. Listened empathically. Provided chaplaincy education. Provided information about Spiritual Care Services. Offered prayer and assurance of continued prayers on patient's behalf. Chart reviewed. The following outcomes where achieved:  Patient shared limited information about both their medical narrative and spiritual journey/beliefs.  confirmed Patient's Yazidi Affiliation. Patient processed feeling about current hospitalization. Patient expressed gratitude for 's visit. Assessment:  Patient does not have any Jew/cultural needs that will affect patients preferences in health care.   There are no spiritual or Roman Catholic issues which require intervention at this time. Plan:  Chaplains will continue to follow and will provide pastoral care on an as needed/requested basis.  recommends bedside caregivers page  on duty if patient shows signs of acute spiritual or emotional distress.  conducted an initial consultation and Spiritual Assessment for Maria Ines Pimentel, who is a 75 y.o.,female. Patients Primary Language is: [place]. According to the patients EMR Roman Catholic Affiliation is: Angelia Fuller.     The reason the Patient came to the hospital is:   Patient Active Problem List    Diagnosis Date Noted    Clotted dialysis access [facility]) 09/21/2017    [facility]ous fistula thrombosis ([facility] 09/21/2017    [facility] 02/17/2016    High-grade AV block 02/17/2016    Essential hypertension 02/17/2016    End-stage renal disease needing dialysis ([facility] 09/29/2015    Vomiting 02/03/2015    Diarrhea 02/03/2015    Abdominal pain 02/03/2015    Cough 02/03/2015    Myalgia 02/03/2015    Gastroenteritis 02/03/2015    HCVD (hypertensive cardiovascular disease) 10/15/2014    Second degree heart block 10/15/2014    Near syncope 09/02/2014    Renal failure 06/26/2014    UTI (urinary tract infection) 06/26/2014    Anemia requiring transfusions 06/26/2014    End stage renal failure untreated by renal replacement therapy ([facility] 06/26/2014        The  provided the following Interventions:  Initiated a relationship of care and support. Explored issues of trey, belief, spirituality and Roman Catholic/ritual needs while hospitalized. Listened empathically. Provided chaplaincy education. Provided information about Spiritual Care Services. Offered prayer and assurance of continued prayers on patient's behalf. Chart reviewed. The following outcomes where achieved:  Patient shared limited information about both their medical narrative and spiritual journey/beliefs.    confirmed Patient's Orthodox Affiliation. Patient processed feeling about current hospitalization. Patient expressed gratitude for 's visit. Assessment:  Patient does not have any Gnosticist/cultural needs that will affect patients preferences in health care. There are no spiritual or Gnosticist issues which require intervention at this time. Plan:  Chaplains will continue to follow and will provide pastoral care on an as needed/requested basis.  recommends bedside caregivers page  on duty if patient shows signs of acute spiritual or emotional distress.     [address] [facility], CPE Resident   Pager: [phone]  Phone: [phone]

## 2017-09-22 NOTE — ROUTINE PROCESS
Bedside and Verbal shift change report given to CASA Castaneda (oncoming nurse) by Rosie Kay RN (offgoing nurse). Report included the following information SBAR, Kardex, MAR and Recent Results. SITUATION:  Code Status: Full Code  Reason for Admission: Clotted dialysis access [facility])  DX  Arteriovenous fistula thrombosis [facility])  Hospital day: 1  Problem List:       Hospital Problems  Date Reviewed: 4/17/2017          Codes Class Noted POA    Clotted dialysis access [facility]) ICD-10-CM: T82.49XA  ICD-9-CM: 996.1  9/21/2017 Unknown        Arteriovenous fistula thrombosis ([facility] ICD-10-CM: A41.087B  ICD-9-CM: 996.73  9/21/2017 Unknown              BACKGROUND:   Past Medical History:   Past Medical History:   Diagnosis Date    Cardiac echocardiogram 09/03/2014    Sm LV cavity. Hyperdynamic LV function. EF 75-80%. No WMA. Severe conc LVH. RVSP 25-30 mmHg. Mod-marked LAE. Mild MR.  Carotid duplex 04/26/2005    No significant occlusive disease bilaterally.  Chronic kidney disease     Hypertension     Murmur 2005    Second degree AV block 2014    Medtronic dual-chamber permanent pacemaker    Stroke [facility])       Patient taking anticoagulants yes    Patient has a defibrillator: no    History of shots YES for example, flu, pneumonia, tetanus   Isolation History NO for example, MRSA, CDiff    ASSESSMENT:  Changes in Assessment Throughout Shift:   Significant Changes in 24 hours (for example, RR/code, fall)  Patient has Central Line: yes Reasons if NO  Patient has Saunders Cath: no Reasons if yes:     Mobility Issues  PT  IV Patency  OR Checklist  Pending Tests    Last Vitals:  Vitals w/ MEWS Score (last day)     Date/Time MEWS Score Pulse Resp Temp BP Level of Consciousness SpO2    09/22/17 0737 3 (!)  34 18 97.1 °F (36.2 °C) 111/64 Alert 95 %    09/22/17 0407 1 62 16 98.4 °F (36.9 °C) 154/78 Alert 97 %    09/22/17 0042 1 68 16 98.6 °F (37 °C) 143/77 Alert 98 %    09/21/17 2130 2 (!)  58 16 97.2 °F (36.2 °C) (!)  178/93 Responds to Voice 98 %    09/21/17 2035 -- -- -- -- 179/73 -- 98 %    09/21/17 2029 -- 69 14 -- -- -- 98 %    09/21/17 2020 -- 60 14 -- -- -- 99 %    09/21/17 2014 -- -- -- -- 151/84 -- 97 %    09/21/17 2011 -- 63 19 -- -- -- 100 %    09/21/17 2005 -- 69 21 -- 130/87 -- --    09/21/17 1954 -- 64 14 -- 168/62 -- 99 %    09/21/17 1947 -- 62 17 -- -- -- 100 %    09/21/17 1944 -- -- -- -- 168/74 -- 100 %    09/21/17 1938 -- 62 15 -- -- -- 100 %    09/21/17 1936 -- 62 16 -- -- -- 100 %    09/21/17 1934 -- 64 19 -- 170/74 -- --    09/21/17 1929 -- 76 13 97.4 °F (36.3 °C) (!)  173/93 -- 100 %    09/21/17 1925 -- 73 14 -- (!)  173/93 -- 100 %    09/21/17 1924 -- 73 14 97.4 °F (36.3 °C) (!)  163/113 -- 100 %    09/21/17 1621 1 68 18 97.8 °F (36.6 °C) 141/64 Alert 96 %    09/21/17 1215 -- (!)  53 30 -- 141/60 -- 94 %    09/21/17 1100 -- 61 (!)  37 -- 154/86 -- 95 %    09/21/17 1030 -- 62 30 -- (!)  168/92 -- 96 %    09/21/17 1000 -- (!)  59 30 -- 153/89 -- 96 %    09/21/17 0930 -- (!)  58 27 -- (!)  163/93 -- 98 %    09/21/17 0850 1 74 20 98.1 °F (36.7 °C) (!)  174/112 Alert 94 %            PAIN    Pain Assessment    Pain Intensity 1: 0 (09/22/17 0400)              Patient Stated Pain Goal: 0  Intervention effective: yes  Time of last intervention: No stated pain Reassessment Completed: yes   Other actions taken for pain:     Last 3 Weights:  Last 3 Recorded Weights in this Encounter    09/21/17 0850   Weight: 79.9 kg (176 lb 2.4 oz)   Weight change:     INTAKE/OUPUT    Current Shift: 09/22 0701 - 09/22 1900  In: 220 [P.O.:220]  Out: -     Last three shifts: 09/20 1901 - 09/22 0700  In: 7 [I.V.:7]  Out: -     RECOMMENDATIONS AND DISCHARGE PLANNING  Patient needs and requests:     Pending tests/procedures:      Discharge plan for patient: yes    Discharge planning Needs or Barriers: none    Estimated Discharge Date: 9/22/17 Posted on Whiteboard in Patients Room: yes       \"HEALS\" SAFETY CHECK  A safety check occurred in the patient's room between off going nurse and oncoming nurse listed above. The safety check included the below items:    H  High Alert Medications Verify all high alert medication drips (heparin, PCA, etc.)  E  Equipment Suction is set up for ALL patients (with sindy)  Red plugs utilized for all equipment (IV pumps, etc.)  WOWs wiped down at end of shift. Room stocked with oxygen, suction, and other unit-specific supplies  A  Alarms Bed alarm is set for fall risk patients  Ensure chair alarm is in place and activated if patient is up in a chair  L  Lines Check IV for any infiltration  Saunders bag is empty if patient has a Saunders   Tubing and IV bags are labeled  S  Safety  Room is clean, patient is clean, and equipment is clean. Hallways are clear from equipment besides carts. Fall bracelet on for fall risk patients  Ensure room is clear and free of clutter  Suction is set up for ALL patients (with sindy)  Hallways are clear from equipment besides carts.    Isolation precautions followed, supplies available outside room, sign posted    Rosa Carey RN

## 2017-09-22 NOTE — PROGRESS NOTES
Hemodialysis Rounding Note      Patient: Lang Khan               Sex: female          DOA: 2017  8:41 AM        YOB: 1950      Age:  75 y.o.        LOS:  LOS: 1 day     Subjective:     Lang Khan is a 75 y.o.  who presents with Clotted dialysis access [facility])  DX  Arteriovenous fistula thrombosis ([address] The patient is dialyzing utilizing the following method:Intermittent Hemodialysis        Complaints none. Current Facility-Administered Medications   Medication Dose Route Frequency    albumin human 25% (BUMINATE) solution 12.5 g  12.5 g IntraVENous DIALYSIS PRN    [START ON 2017] epoetin windy (EPOGEN;PROCRIT) injection 4,000 Units  4,000 Units IntraVENous DIALYSIS TUE, THU & SAT    hydrALAZINE (APRESOLINE) tablet 50 mg  50 mg Oral TID    metoprolol tartrate (LOPRESSOR) tablet 50 mg  50 mg Oral BID    sevelamer carbonate (RENVELA) tab 800 mg  800 mg Oral TID WITH MEALS    acetaminophen (TYLENOL) tablet 650 mg  650 mg Oral Q4H PRN    HYDROcodone-acetaminophen (NORCO) 5-325 mg per tablet 1 Tab  1 Tab Oral Q4H PRN    heparin (porcine) injection 5,000 Units  5,000 Units SubCUTAneous Q8H    dextrose 5 % - 0.2% NaCl infusion  25 mL/hr IntraVENous CONTINUOUS    lactated Ringers infusion  75 mL/hr IntraVENous CONTINUOUS    diphenhydrAMINE (BENADRYL) injection 25 mg  25 mg IntraVENous Multiple    ondansetron (ZOFRAN) injection 4 mg  4 mg IntraVENous Q4H PRN    albuterol (PROVENTIL VENTOLIN) nebulizer solution 2.5 mg  2.5 mg Nebulization Q4H PRN       701 - 1900  In: 560 [P.O.:560]  Out: -   1901 -  0700  In: 7 [I.V.:7]  Out: -       Objective:     Blood pressure 117/71, pulse 63, temperature 97.5 °F (36.4 °C), resp. rate 18, height 5' 6\" (1.676 m), weight 79.9 kg (176 lb 2.4 oz), SpO2 96 %, not currently breastfeeding.   Temp (24hrs), Av.7 °F (36.5 °C), Min:97.1 °F (36.2 °C), Max:98.6 °F (37 °C)      Blood Pressure: BP: 117/71  Pulse: Pulse (Heart Rate): 63  Temp:  Temp: 97.5 °F (36.4 °C)    Artificial Kidney     hours     Heparin Bolus    Blood flow rate     Dialysate rate     Arterial Access Pressure    Venous Return Pressure    Ultrafiltration Rate    Fluid Removal    Net Fluid Removal        PHYSICAL EXAM: alert, oriented x 3 afebrile  HEENT:  Non icteric  NECK:  No JVD  CHEST AND LUNGS:  Clear ant/lat  CVS:  Regular no rub  ABDOMEN:  Soft + bs  EXT:  + LE edema, left arm AVF    DATA REVIEW:    Labs: Results:       Chemistry Recent Labs      09/21/17   0950   GLU  84   NA  141   K  4.5   CL  108   CO2  25   BUN  70*   CREA  8.16*   CA  8.4*   AGAP  8   BUCR  9*   AP  173*   TP  7.2   ALB  3.2*   GLOB  4.0   AGRAT  0.8      CBC w/Diff Recent Labs      09/21/17   0950   WBC  5.1   RBC  3.12*   HGB  10.4*   HCT  35.8   PLT  105*   GRANS  73   LYMPH  18*   EOS  2      Coagulation Recent Labs      09/21/17   0950   PTP  13.1   INR  1.0       Iron/Ferritin No results for input(s): IRON in the last 72 hours. No lab exists for component: TIBCCALC   BNP No results for input(s): BNPP in the last 72 hours. Cardiac Enzymes No results for input(s): CPK, CKND1, BONI in the last 72 hours. No lab exists for component: CKRMB, TROIP   Liver Enzymes Recent Labs      09/21/17   0950   TP  7.2   ALB  3.2*   AP  173*   SGOT  7*      Thyroid Studies Lab Results   Component Value Date/Time    TSH 0.89 09/03/2014 07:40 AM          Olivia gram report noted        Assessment/Plan:     End Stage Renal Disease:  Patient is tolerating dialysis treatment well. .  Additionally the patient has experienced normal dialysis treatment during dialysis. Dry weight   same. She will go back to her outpt unit  tomorrow    At 4:00 PM on 9/22/2017, I saw and examined patient during hemodialysis treatment. The patient was receiving hemodialysis for treatment of end stage renal disease.  I have also reviewed vital signs, intake and output, lab results and recent events, and agreed with today's dialysis order.       Anemia:  Epo with HD    Renal Metabolic Bone Disease:  Cont renvela                      Hypertension: controlled    Access: Fistula adequate and difficult cannulation monitoring/no changes and sched follow up vasc follow up next week         Robert Friedman MD  9/22/2017

## 2017-09-22 NOTE — OP NOTES
[address]    Name:  Gary Chacon  MR#:  [id number]  :  1950  Account #:  [de-identified]  Date of Adm:  2017  Date of Surgery:  2017      PREOPERATIVE DIAGNOSIS: Clotted fistula, left arm, central venous  occlusion. POSTOPERATIVE DIAGNOSIS: Clotted fistula, left arm, central  venous occlusion. PROCEDURES PERFORMED  1. Fistulogram, left arm with interpretation. 2. Reflux arteriogram with interpretation. 3. Balloon angioplasty of arteriovenous fistula with associated  angiography interpretation. 4. Placement of catheter in central vein. 5. Central venogram with radiographic interpretation. COMPLICATIONS: 0.    ESTIMATED BLOOD LOSS: 0.    CONDITION OF THE PATIENT: Stable. SPECIMENS REMOVED: None. ANESTHESIA: Moderate sedation. DESCRIPTION OF PROCEDURE: The patient is a 79-year-old who  came to the emergency department initially with bleeding fistula they  placed a blood pressure cuff on. Unfortunately, it appeared clotted. She was n.p.o. I did bring her to the OR today. She had moderate  sedation, preoperative antibiotic. Her left arm was prepped and draped  in the usual standard fashion. She has what appears to be a basilic  vein transposition. It was pulsatile at the arterial portion. I localized and  accessed here and placed a 4-New Zealander sheath and performed a  fistulogram initially with a reflux arteriogram. The brachial artery and  proximal fistula are totally patent. At the mid fistula, there is total  occlusion, crossed this with a Glidewire and placed a catheter and  exchanged for a V18 wire and then balloon angioplastied the outflow of  the fistula, opening it completely. From the axilla to the elbow, the  fistula was wide open, but there was sluggish flow. I placed the  catheter into the central system. There is a pacemaker here.  I was able  to balloon all the way up to the clavicle, but the central vein to the  pacemaker wire was just totally occluded. I achieved a thrill in the  fistula. There was generous chest wall collaterals. I was uneasy getting  more aggressive for fear of disturbing the pacemaker wires and really  has a pretty dense occlusion. The Glidewire would not pass. I went  through this sheath, put a stitch of 3-0 Ethilon at the access site. There  was no bleeding. She had a palpable thrill in her fistula. We are going  to discharge her to go to dialysis. She will need followup with her  regular surgeon, likely will have to redress possibly with a new access  regarding the central venous occlusion. She understands.         DO LIBAN Myers  D:  09/22/2017   09:05  T:  09/22/2017   09:32  Job #:  [id number]

## 2017-09-22 NOTE — PROGRESS NOTES
Albuterol NEB was therapeutically interchanged for Albuterol INHAl per the P&T Committee approved Therapeutic Interchanges Policy.     Shilpa Vazquez [facility], Pharmacist  9/21/2017 10:27 PM

## 2017-09-22 NOTE — DISCHARGE SUMMARY
Hospitalist Discharge Summary     Patient ID:  Ama Butt  [id number]  22 y.o.  1950    PCP on record: Srinivas Karimi MD    Admit date: 9/21/2017  Discharge date and time: 9/22/2017      DISCHARGE DIAGNOSIS:    1. Acute fistula thrombosis  2. ESRD, on HD TTS  3. Benign essential HTN  4. H/o CVA  5. H/o Second degree AVB s/p PPM  6. Anemia of chronic disease      CONSULTATIONS:  Nephrology  Vascular Surgery    Excerpted HPI from H&P of Sharyle Cowboy, MD:  Christel Collazo is a 75 y.o.  female who presents with inability to effectively access her AVF during HD. Pt has h/o ESRD and HTN who was at HD on today and staff could not access her AVF and it began to bleed uncontrollably, per report. Pt was brought tot he ED and bleeding was controlled. Nephrology was called and fistula was imaged and noted to have an acute occlusive clot within. Vascular has been consulted and nephrology is awaiting appropriate access for completion of HD on today. Of note, med list includes coumadin, but INR 1.1 on today.      We were asked to admit for work up and evaluation of the above problems. ______________________________________________________________________  DISCHARGE SUMMARY/HOSPITAL COURSE:  for full details see H&P, daily progress notes, labs, consult notes. Pt was admitted for vascular procedure on AVF which she tolerated well. She was provided access for HD and had her session on today, 9/22/17. She had no signficant electrolyte issues and is scheduled to go back to her normal schedule tomorrow. Of note, she is on coumadin, but her INR was only 1.1. She was on hep gtt during her hospitalization and started back on coumadin. She was given 10 mg today and will continue 5 mg daily upon discharge. She will need her INR monitored at dialysis and her coumadin adjusted as needed.    _______________________________________________________________________  Patient seen and examined by me on discharge day. Pertinent Findings:  Gen:    Not in distress  Chest: Clear lungs  CVS:   Regular rhythm. No edema  Abd:  Soft, not distended, not tender  Neuro:  Alert, oriented x 3  _______________________________________________________________________  DISCHARGE MEDICATIONS:   Current Discharge Medication List      CONTINUE these medications which have NOT CHANGED    Details   albuterol (PROVENTIL HFA, VENTOLIN HFA, PROAIR HFA) 90 mcg/actuation inhaler Take 2 Puffs by inhalation every four (4) hours as needed for Wheezing. ondansetron hcl (ZOFRAN, AS HYDROCHLORIDE,) 4 mg tablet Take 2 tablets by mouth every eight (8) hours as needed for Nausea. Qty: 12 tablet, Refills: 0      sevelamer carbonate (RENVELA) 800 mg tab tab Take 1 Tab by mouth three (3) times daily (with meals). Qty: 90 Tab, Refills: 0      hydrALAZINE (APRESOLINE) 25 mg tablet Take 50 mg by mouth three (3) times daily. warfarin (COUMADIN) 5 mg tablet       oxyCODONE-acetaminophen (PERCOCET) 5-325 mg per tablet       metoprolol (LOPRESSOR) 50 mg tablet 50 mg two (2) times a day. My Recommended Diet, Activity, Wound Care, and follow-up labs are listed in the patient's Discharge Insturctions which I have personally completed and reviewed. _______________________________________________________________________  DISPOSITION:    Home with Family: x   Home with HH/PT/OT/RN:    SNF/LTC:    MOISÉS:    OTHER:        Condition at Discharge:  Stable  _______________________________________________________________________  Follow up with:   PCP : Gaby Patino MD  Follow-up Information     Follow up With Details Comments Magdalen Heimlich., MD  Doctor sees patient out of town, will contact patient and set up home visit upon his return monday.   [place] [address]  [phone]                Total time in minutes spent coordinating this discharge (includes going over instructions, follow-up, prescriptions, and preparing report for sign off to her PCP) :  45 minutes    Signed:  Junior Georges MD  Internal Medicine  Hospitalist Division

## 2017-10-21 PROBLEM — J96.90 RESPIRATORY FAILURE (HCC): Status: ACTIVE | Noted: 2017-01-01

## 2017-10-21 PROBLEM — Z99.2 ESRD ON DIALYSIS (HCC): Status: ACTIVE | Noted: 2017-01-01

## 2017-10-21 PROBLEM — N18.6 ESRD (END STAGE RENAL DISEASE) (HCC): Status: ACTIVE | Noted: 2017-01-01

## 2017-10-21 PROBLEM — J96.00 ACUTE RESPIRATORY FAILURE (HCC): Status: ACTIVE | Noted: 2017-01-01

## 2017-10-21 PROBLEM — N18.6 ESRD ON DIALYSIS (HCC): Status: ACTIVE | Noted: 2017-01-01

## 2017-10-21 PROBLEM — J90 PLEURAL EFFUSION: Status: ACTIVE | Noted: 2017-01-01

## 2017-10-21 PROBLEM — R41.82 ALTERED MENTAL STATUS: Status: ACTIVE | Noted: 2017-01-01

## 2017-10-21 NOTE — ED TRIAGE NOTES
Pt BIBA with c/o SOB when inside her home. Pt's O2 sats at 92% on RA upon EMS arrival. Pt with pacemaker with frequent pauses that resolved after EMS administered 2 L of O2 with O2 sats to 100%. Pt currently being worked up for possible allergy to something in her home. Pt had dialysis today, complete tx.

## 2017-10-21 NOTE — ED PROVIDER NOTES
HPI Comments: 2:53 PM Mariola Baker is a 75 y.o. female with a h/o HTN, stroke, chronic kidney disease who presents to the ED via EMS c/o intermittent SOB that began a month ago. She reports increased SOB when exposed to pollutants such as cigarette smoke and dust. She states that her inhaler provides only brief relief. She is currently only on O2 at dialysis. She notes associated cought and tremors and reduced appetite over the past few days, but denies fever. Her last dialysis treatment was completed today through a groin catheter. She denies pain associated with the catheter. Patient is unable to ambulate. Nephrology: Dr. Miguel Sanders  PCP:  Tamara Triplett MD      The history is provided by the patient. No  was used. Past Medical History:   Diagnosis Date    Cardiac echocardiogram 09/03/2014    Sm LV cavity. Hyperdynamic LV function. EF 75-80%. No WMA. Severe conc LVH. RVSP 25-30 mmHg. Mod-marked LAE. Mild MR.  Carotid duplex 04/26/2005    No significant occlusive disease bilaterally.  Chronic kidney disease     Hypertension     Murmur 2005    Second degree AV block 2014    Medtronic dual-chamber permanent pacemaker    Stroke [facility])        Past Surgical History:   Procedure Laterality Date    HX TUBAL LIGATION           Family History:   Problem Relation Age of Onset    Hypertension Mother        Social History     Social History    Marital status: SINGLE     Spouse name: N/A    Number of children: N/A    Years of education: N/A     Occupational History    Not on file. Social History Main Topics    Smoking status: Never Smoker    Smokeless tobacco: Never Used    Alcohol use No    Drug use: Not on file    Sexual activity: No     Other Topics Concern    Not on file     Social History Narrative         ALLERGIES: Review of patient's allergies indicates no known allergies.     Review of Systems   Constitutional: Positive for appetite change. Negative for activity change, fatigue and fever. HENT: Negative for congestion and rhinorrhea. Eyes: Negative for visual disturbance. Respiratory: Positive for cough and shortness of breath. Cardiovascular: Negative for chest pain and palpitations. Gastrointestinal: Negative for abdominal pain, diarrhea, nausea and vomiting. Genitourinary: Negative for dysuria and hematuria. Musculoskeletal: Negative for back pain. Skin: Negative for rash. Neurological: Positive for tremors. Negative for dizziness, weakness and light-headedness. Vitals:    10/21/17 2023 10/21/17 2024 10/21/17 2025 10/21/17 2030   BP:    157/64   Pulse: 90 91 95 97   Resp: 30 (!) 35 29 (!) 39   Temp:       SpO2: 99% 98% 99% 99%            Physical Exam   Constitutional: She is oriented to person, place, and time. She appears well-developed and well-nourished. No distress. Globally weak   HENT:   Head: Normocephalic and atraumatic. Right Ear: External ear normal.   Left Ear: External ear normal.   Nose: Nose normal.   Mouth/Throat: Oropharynx is clear and moist.   Eyes: Conjunctivae and EOM are normal. Pupils are equal, round, and reactive to light. No scleral icterus. Neck: Normal range of motion. Neck supple. No JVD present. No tracheal deviation present. No thyromegaly present. Cardiovascular: Normal rate, regular rhythm, normal heart sounds and intact distal pulses. Exam reveals no gallop and no friction rub. No murmur heard. Pacer in L chest wall    Pulmonary/Chest: Effort normal. She has wheezes. She exhibits no tenderness. Decreased air entry B   Abdominal: Soft. Bowel sounds are normal. She exhibits no distension. There is no tenderness. There is no rebound and no guarding. Musculoskeletal: Normal range of motion. She exhibits no edema or tenderness. R groin with HD cath noted  Suture noted in L UE, thrill noted in AVF    Lymphadenopathy:     She has no cervical adenopathy. Neurological: She is alert and oriented to person, place, and time. No cranial nerve deficit. Coordination normal.   Globally weak, nonambulatory, follows commands    Skin: Skin is warm and dry. Psychiatric: She has a normal mood and affect. Her behavior is normal. Judgment and thought content normal.   Supportive family at the bedside    Nursing note and vitals reviewed. MDM  Number of Diagnoses or Management Options  Diagnosis management comments: Pt is a 64yo female with a hx of ESRD on HD (TTS), hx of CVA, pacer for high degree av block, anemia, and reactive airway presents with one month of increasing dyspnea that seems to be related to enviromental changes. She notes that it is clear and productive and does not improve with HD. Suspect reactive airway and will trial of nebs, CXR, cardiac labs, INR, then reevaluate. Pt is on [facility] for a DVT 2 years ago. If not effectively anticoagulated will consider PE as well.  Chely Factor, DO 3:42 PM      ED Course       Procedures    Vitals:  Patient Vitals for the past 12 hrs:   Temp Pulse Resp BP SpO2   10/21/17 2030 - 97 (!) 39 157/64 99 %   10/21/17 2025 - 95 29 - 99 %   10/21/17 2024 - 91 (!) 35 - 98 %   10/21/17 2023 - 90 30 - 99 %   10/21/17 1900 - 75 27 - 100 %   10/21/17 1800 - 95 25 - 100 %   10/21/17 1600 - 73 29 - 99 %   10/21/17 1530 - 72 25 - 100 %   10/21/17 1503 97.4 °F (36.3 °C) 77 24 - 100 %   10/21/17 1500 - 73 28 125/59 100 %       Medications ordered:   Medications   albuterol-ipratropium (DUO-NEB) 2.5 MG-0.5 MG/3 ML (3 mL Nebulization Given 10/21/17 1620)   LORazepam (ATIVAN) injection 0.5 mg (0.5 mg IntraVENous Given 10/21/17 2000)   iopamidol (ISOVUE-370) 76 % injection  mL (50 mL IntraVENous Given 10/21/17 2034)   flumazenil (ROMAZICON) 0.1 mg/mL injection 0.2 mg (0.2 mg IntraVENous Given 10/21/17 8836)         Lab findings:  Recent Results (from the past 12 hour(s))   CBC WITH AUTOMATED DIFF    Collection Time: 10/21/17  3:35 PM   Result Value Ref Range    WBC 5.1 4.6 - 13.2 K/uL    RBC 3.34 (L) 4.20 - 5.30 M/uL    HGB 11.1 (L) 12.0 - 16.0 g/dL    HCT 35.7 35.0 - 45.0 %    .9 (H) 74.0 - 97.0 FL    MCH 33.2 24.0 - 34.0 PG    MCHC 31.1 31.0 - 37.0 g/dL    RDW 13.8 11.6 - 14.5 %    PLATELET 227 679 - 490 K/uL    MPV 9.9 9.2 - 11.8 FL    NEUTROPHILS 76 (H) 40 - 73 %    LYMPHOCYTES 15 (L) 21 - 52 %    MONOCYTES 7 3 - 10 %    EOSINOPHILS 2 0 - 5 %    BASOPHILS 0 0 - 2 %    ABS. NEUTROPHILS 3.9 1.8 - 8.0 K/UL    ABS. LYMPHOCYTES 0.8 (L) 0.9 - 3.6 K/UL    ABS. MONOCYTES 0.4 0.05 - 1.2 K/UL    ABS. EOSINOPHILS 0.1 0.0 - 0.4 K/UL    ABS. BASOPHILS 0.0 0.0 - 0.1 K/UL    DF AUTOMATED     METABOLIC PANEL, COMPREHENSIVE    Collection Time: 10/21/17  3:35 PM   Result Value Ref Range    Sodium 133 (L) 136 - 145 mmol/L    Potassium 3.2 (L) 3.5 - 5.5 mmol/L    Chloride 101 100 - 108 mmol/L    CO2 27 21 - 32 mmol/L    Anion gap 5 3.0 - 18 mmol/L    Glucose 73 (L) 74 - 99 mg/dL    BUN 16 7.0 - 18 MG/DL    Creatinine 2.92 (H) 0.6 - 1.3 MG/DL    BUN/Creatinine ratio 5 (L) 12 - 20      GFR est AA 19 (L) >60 ml/min/1.73m2    GFR est non-AA 16 (L) >60 ml/min/1.73m2    Calcium 8.0 (L) 8.5 - 10.1 MG/DL    Bilirubin, total 0.3 0.2 - 1.0 MG/DL    ALT (SGPT) 10 (L) 13 - 56 U/L    AST (SGOT) 11 (L) 15 - 37 U/L    Alk.  phosphatase 214 (H) 45 - 117 U/L    Protein, total 8.0 6.4 - 8.2 g/dL    Albumin 3.4 3.4 - 5.0 g/dL    Globulin 4.6 (H) 2.0 - 4.0 g/dL    A-G Ratio 0.7 (L) 0.8 - 1.7     MAGNESIUM    Collection Time: 10/21/17  3:35 PM   Result Value Ref Range    Magnesium 2.1 1.6 - 2.6 mg/dL   CARDIAC PANEL,(CK, CKMB & TROPONIN)    Collection Time: 10/21/17  3:35 PM   Result Value Ref Range    CK 41 26 - 192 U/L    CK - MB 3.4 <3.6 ng/ml    CK-MB Index 8.3 (H) 0.0 - 4.0 %    Troponin-I, Qt. 0.11 (H) 0.0 - 0.045 NG/ML   PROTHROMBIN TIME + INR    Collection Time: 10/21/17  3:35 PM   Result Value Ref Range    Prothrombin time 13.9 11.5 - 15.2 sec    INR 1.1 0.8 - 1.2 PTT    Collection Time: 10/21/17  3:35 PM   Result Value Ref Range    aPTT 33.0 23.0 - 36.4 SEC   D DIMER    Collection Time: 10/21/17  3:35 PM   Result Value Ref Range    D DIMER 8.27 (H) <0.46 ug/ml(FEU)   POC LACTIC ACID    Collection Time: 10/21/17  4:16 PM   Result Value Ref Range    Lactic Acid (POC) 0.5 0.4 - 2.0 mmol/L   POC G3    Collection Time: 10/21/17  9:18 PM   Result Value Ref Range    Device: NASAL CANNULA      Flow rate (POC) 2.5 L/M    pH (POC) 7.030 (LL) 7.35 - 7.45      pCO2 (POC) 98.7 (H) 35.0 - 45.0 MMHG    pO2 (POC) 79 (L) 80 - 100 MMHG    HCO3 (POC) 26.0 22 - 26 MMOL/L    sO2 (POC) 86 (L) 92 - 97 %    Base deficit (POC) 7 mmol/L    Allens test (POC) YES      Total resp. rate 41      Site RIGHT RADIAL      Specimen type (POC) ARTERIAL      Performed by Rojas Hu        EKG interpretation by ED Physician:  16:42  Paced @ 73 bpm. No STEMI    X-Ray, CT or other radiology findings or impressions:  XR CHEST SNGL V   1.  Large left pleural effusion which is potentially loculated.     2.  Mild interstitial pulmonary edema. As interpreted by RAD. CTA CHEST W OR W WO CONT    (Results Pending)      Nondiagnostic for PE. Please see above. .      Large left effusion, and resultant near complete collapse of the left lung. Small to moderate right effusion and partial collapse of the dependent right  lower lobe.     Global cardiomegaly with significant enlargement of the right atrium. End-stage  kidney disease.             Progress notes, Consult notes or additional Procedure notes:   Pt D-dimer is elevated and discussed the case with Dr. Sina Portillo. She agrees with contrast and can dialyze her as needed. Called by CT as patient cannot tolerate CT without sedatives. Ordered 0.5 mg of ativan due to the need for the CT. Pt alert and oriented without distress for CT. Pt returned from CT and was drowsy but would respond. Sats 100 on 3L NC. Pt difficult to awaken.   Called RT for ABG and to consider Bipap. CT notes a large L pleural effusion. Pt given as small dose of flumazinil as she is not on a benzo and does not have a hx of etoh use. Pt more alert after the reversal.      Discussed the case with Dr. Ronald Cruz and will admit to step down given her need for a likely procedure. Jona Thornton, DO 9:22 PM    Pt abg is suggestive of acute respiratory failure. Will start bipap and will discuss with the ICU. Jona Thornton DO 9:33 PM    Discussed the case with Dr. Lilian Ji and will admit to the ICU. Jona Thornton,  9:42 PM        Reevaluation of patient:   I have reassessed the patient and is more alert. .    Disposition:  Diagnosis:   1. Dyspnea, unspecified type    2. Pleural effusion    3. ESRD on dialysis [facility])        Disposition: Admit     Follow-up Information     None           Patient's Medications   Start Taking    No medications on file   Continue Taking    ALBUTEROL (PROVENTIL HFA, VENTOLIN HFA, PROAIR HFA) 90 MCG/ACTUATION INHALER    Take 2 Puffs by inhalation every four (4) hours as needed for Wheezing. HYDRALAZINE (APRESOLINE) 25 MG TABLET    Take 50 mg by mouth three (3) times daily. METOPROLOL (LOPRESSOR) 50 MG TABLET    50 mg two (2) times a day. ONDANSETRON HCL (ZOFRAN, AS HYDROCHLORIDE,) 4 MG TABLET    Take 2 tablets by mouth every eight (8) hours as needed for Nausea. OXYCODONE-ACETAMINOPHEN (PERCOCET) 5-325 MG PER TABLET        SEVELAMER CARBONATE (RENVELA) 800 MG TAB TAB    Take 1 Tab by mouth three (3) times daily (with meals).     WARFARIN (COUMADIN) 5 MG TABLET       These Medications have changed    No medications on file   Stop Taking    No medications on file         Via Yu Pruitt acting as a scribe for and in the presence of Lizet Baez DO      October 21, 2017 at 3:05 PM       Provider Attestation:      I personally performed the services described in the documentation, reviewed the documentation, as recorded by the scribe in my presence, and it accurately and completely records my words and actions.  October 21, 2017 at 3:05 PM - Katherine Lora DO

## 2017-10-21 NOTE — Clinical Note
Status[de-identified] Inpatient [101] Type of Bed: Intensive Care [6] Inpatient Hospitalization Certified Necessary for the Following Reasons: 4. Patient requires ICU level of care interventions (further clarification in H&P documentation) Admitting Diagnosis: [facility] [518.81. ICD-9-CM] Admitting Diagnosis: ESRD (end stage renal disease) ([facility] [627710] Admitting Diagnosis: Pleural effusion [520274] Admitting Physician: Shola Pereira [[id number]] Attending Physician: Shola Pereira [[id number]] Estimated Length of Stay: 2 Midnights Discharge Plan[de-identified] Home with Office Follow-up

## 2017-10-21 NOTE — ED NOTES
Bedside shift change report given to Elba Walker RN (oncoming nurse) by Almyra Lombard, RN   (offgoing nurse). Report included the following information SBAR, ED Summary, Intake/Output, MAR and Recent Results.

## 2017-10-22 NOTE — ED NOTES
TRANSFER - OUT REPORT:    Verbal report given to romi rn (name) on America Orellana  being transferred to MICU (unit) for routine progression of care       Report consisted of patients Situation, Background, Assessment and   Recommendations(SBAR). Information from the following report(s) SBAR was reviewed with the receiving nurse. Lines:   Peripheral IV 10/21/17 Right Antecubital (Active)   Site Assessment Clean, dry, & intact 10/21/2017  3:45 PM   Phlebitis Assessment 0 10/21/2017  3:45 PM   Infiltration Assessment 0 10/21/2017  3:45 PM   Dressing Status Clean, dry, & intact 10/21/2017  3:45 PM   Dressing Type Transparent 10/21/2017  3:45 PM   Hub Color/Line Status Pink;Flushed;Patent 10/21/2017  3:45 PM        Opportunity for questions and clarification was provided.       Patient transported with:   Monitor  O2 @ bipap  liters  Registered Nurse

## 2017-10-22 NOTE — PROGRESS NOTES
10/22/17 0025   Oxygen Therapy   O2 Sat (%) 94 %   Pulse via Oximetry 71 beats per minute   O2 Device BIPAP   FIO2 (%) 40 %   CPAP/BIPAP   CPAP/BIPAP Start/Stop On   Device Mode S/T   $$ Bipap Daily Yes   Mask Type and Size Full face   PIP Observed 20 cm H20   IPAP (cm H2O) 20 cm H2O   EPAP (cm H2O) 5 cm H2O   Inspiratory Time (sec) 1 seconds   Vt Spont (ml) 723 ml   Ve Observed (l/min) 12.6 l/min   Backup Rate 6   Total RR (Spontaneous) 14 breaths per minute   Insp Rise Time (sec) 3   Leak (Estimated) 50 L/min   Pt's Home Machine No   Biomedical Check Performed Yes   Settings Verified Yes   Alarm Settings   High Pressure 24   Low Pressure 3   Apnea 30   Low Ve 2   High Rate 45   Low Rate 6     Settings adjusted post ABG results. Increased FiO2 to 40% and IPAP to 20 cmH2O.

## 2017-10-22 NOTE — ED NOTES
Assumed care of patient from [place], [place]. Pt is currently in CT awaiting scan. Pt has dialysis today. Pt has dialysis T-Th-Sat. Pt is on NC 3L; per ;day shift RN pt is AOX4.

## 2017-10-22 NOTE — ED NOTES
Assumed care of pt from nino webber pt stable on cardiac monitor on bipap, no s/s of cardiac or respiratory distress, continue to monitor pt

## 2017-10-22 NOTE — PROGRESS NOTES
Admit Date: 10/21/2017  Date of Service: 10/22/2017    Reason for follow-up: acute respiratory failure      Assessment:         Acute hypercarbic respiratory failure with respiratory acidosis:  Etiology unlcear at this time. Now intubated  Large left pleural effusion: awaiting thoracentesis  ESRD on HD  Anemia of chronic disease:  Stable  Mild hyponatremia    Plan:   Discussed with Dr. Seamus Thompson. Awiating transfer to ICU  Patient awaiting thoracentesis- send fluid for LDH, cell count, protein, cytology and culture. Anticipate decline and requirement for intubation. Continue Vancomycin and cefepime  HD as per nephrology  Follow CBC, CMP  CXR in am  F/u blood cx  NPO      Addendum:   Went to evaluate patient following transfer to ICU. She is currently being intubated with assistance of anaesthesia. Current Antibtiocs:   vancomycin and cefepime 10/21- present    Lines:   Right groin HD cath  Peripheral IV    I have independently examined the patient and reviewed all lab studies and imgaing as well as review of nursing notes and physican notes from the past 24 hours. The plan of care has been discussed with the patient and all questions are answered. [address], [address] [facility]  Office [phone]  Fax [phone]      No Known Allergies        Subjective:      Pt seen and examined. Remains in ED awaiting transfer to ICU. Family in room. Patient occasionally opens eyes but remains poorly responsive. Objective:        Visit Vitals    /49    Pulse 68    Temp 97.4 °F (36.3 °C)    Resp 26    Ht 5' 5\" (1.651 m)    Wt 91.6 kg (202 lb)    SpO2 100%    BMI 33.61 kg/m2     No data recorded.         General:   critically ill appearing, obtunded, poorly responsive   Skin:   no rashes or skin lesions noted on limited exam, dry and warm; LUE AVF without warmth or erythema, right groin HD cath   HEENT:  No scleral icterus or pallor;    Lymph Nodes:   not assessed today   Lungs:   BiPap in place, tachypnic, decreased breath sounds on left with occasional crackle. No wheezes   Heart:  RRR, s1 and s2; systolic murmur; no rubs or gallops; trace b/l LE edema, + diminished pedal pulses   Abdomen:  soft,obese, non-distended, active bowel sounds   Genitourinary:  deferred   Extremities:   average muscle tone; no contractures, no joint effusions   Neurologic:  Obtunded, grimaces to pain   Psychiatric:   unable to assess due to clinical condition         Labs: Results:   Chemistry Recent Labs      10/22/17   0551  10/21/17   1535   GLU  82  73*   NA  135*  133*   K  3.8  3.2*   CL  104  101   CO2  25  27   BUN  22*  16   CREA  3.99*  2.92*   CA  8.3*  8.0*   AGAP  6  5   BUCR  6*  5*   AP  206*  214*   TP  7.4  8.0   ALB  3.2*  3.4   GLOB  4.2*  4.6*   AGRAT  0.8  0.7*      CBC w/Diff Recent Labs      10/22/17   0551  10/21/17   1535   WBC  6.9  5.1   RBC  3.24*  3.34*   HGB  11.0*  11.1*   HCT  35.1  35.7   PLT  164  169   GRANS  77*  76*   LYMPH  16*  15*   EOS  1  2        Lab Results   Component Value Date/Time    Specimen Description: BLOOD 05/15/2011 06:50 PM    Specimen Description: URINE 05/15/2011 06:50 PM    Specimen Description: BLOOD 05/15/2011 06:40 PM    Lab Results   Component Value Date/Time    Culture result: NO GROWTH AFTER 13 HOURS 10/21/2017 05:00 PM    Culture result: NO GROWTH AFTER 13 HOURS 10/21/2017 04:10 PM    Culture result: NO GROWTH 6 DAYS 02/03/2015 09:51 AM    Culture result: >100,000  COLONIES/mL  SERRATIA MARCESCENS   02/03/2015 09:45 AM    Culture result: >100,000  COLONIES/mL  PROVIDENCIA SPECIES   02/03/2015 09:45 AM    Culture result:  02/03/2015 09:45 AM     >100,000  COLONIES/mL  ENTEROCOCCUS FAECALIS GROUP D      Culture result: [id number]  COLONIES/mL  ALCALIGENES FAECALIS   02/03/2015 09:45 AM          Imaging:     10/22 CXR: Large left pleural effusion and probable small right pleural effusion.   Hazy opacities throughout the left lung and in the lower right lung.  Stable  left-sided pacer

## 2017-10-22 NOTE — PROCEDURES
Intubation Note    Referring physician: Torey Cristina MD     Called to bedside secondary to  impending respiratory failure. Patient pre-oxygenated with 100% oxygen. Smooth RSI with [id number], [id number]. DVL x 2. Attempt glide scope, no #3 blade available. #4 blade not adequate. Intubated via DL. Cords poorly visualized. EtCO2 yellow with vent. RN and RT confirmed BBS. O2Sat = 100%  Left unit. Called stat to return. Bag vent in progress. Sat had dropped,  Suspect tube dislodgment vs esop intubation. O2 Sat mid to high 90's  Etom 8mg, sux 60 mg IV  [place] scope from OR in room with #3 blade. Cords visualized. 7.0 ETT passed. BBS, + ETCO2      7.0 Regular/[place] ETT taped and secured at 22 cm at the teeth.    + Bilateral BS, + Chest rise, + ETCO2    VSS. CXR pending.         Abebe Luo MD

## 2017-10-22 NOTE — CONSULTS
[facility]  Pulmonary, Critical Care, and Sleep Medicine      Critical Care Initial Patient Consult      Consulting physician: Ángela Chacon    Reason for consult:     Chief complaints: Hypercarbic respiratory failure    Source:  Pt was very somnolent at bedside so history was obtained from the medical record    HPI:  Pt is a 76 y/o AA female with hx of ESRD on HD, presented with one month of worsening dyspnea. EMS was activated and patient was noted to be dyspneic. CXR showed large left sided effusion. Pt was given ativan and then became more somnolent. ICU was called overnight for advice and pt was started on Bipap for hypercarbic respiratory failure. Pt this morning is still somnolent and unable to provide any history. Past Medical History:   Diagnosis Date    Cardiac echocardiogram 09/03/2014    Sm LV cavity. Hyperdynamic LV function. EF 75-80%. No WMA. Severe conc LVH. RVSP 25-30 mmHg. Mod-marked LAE. Mild MR.  Carotid duplex 04/26/2005    No significant occlusive disease bilaterally.  Chronic kidney disease     Hypertension     Murmur 2005    Second degree AV block 2014    Medtronic dual-chamber permanent pacemaker    Stroke [facility])       Past Surgical History:   Procedure Laterality Date    HX TUBAL LIGATION        Prior to Admission medications    Medication Sig Start Date End Date Taking? Authorizing Provider   hydrALAZINE (APRESOLINE) 25 mg tablet Take 50 mg by mouth three (3) times daily. Historical Provider   warfarin (COUMADIN) 5 mg tablet  2/11/16   Historical Provider   oxyCODONE-acetaminophen (PERCOCET) 5-325 mg per tablet  11/25/15   Historical Provider   metoprolol (LOPRESSOR) 50 mg tablet 50 mg two (2) times a day. Historical Provider   albuterol (PROVENTIL HFA, VENTOLIN HFA, PROAIR HFA) 90 mcg/actuation inhaler Take 2 Puffs by inhalation every four (4) hours as needed for Wheezing.     Historical Provider   ondansetron hcl (ZOFRAN, AS HYDROCHLORIDE,) 4 mg tablet Take 2 tablets by mouth every eight (8) hours as needed for Nausea. 2/3/15   Soy Hightower MD   sevelamer carbonate (RENVELA) 800 mg tab tab Take 1 Tab by mouth three (3) times daily (with meals). 14   Marylee Balo, MD     Current Facility-Administered Medications   Medication Dose Route Frequency    famotidine (PF) (PEPCID) 20 mg in sodium chloride 0.9 % 10 mL injection  20 mg IntraVENous Q12H    VANCOMYCIN INFORMATION NOTE   Other Rx Dosing/Monitoring    [START ON 10/23/2017] Vancomycin random level due at 04:00 on 10-   Other ONCE    sodium chloride 0.9 % bolus infusion 250 mL  250 mL IntraVENous ONCE    heparin (porcine) injection 5,000 Units  5,000 Units SubCUTAneous Q12H    albuterol-ipratropium (DUO-NEB) 2.5 MG-0.5 MG/3 ML  3 mL Nebulization Q6H RT    cefepime (MAXIPIME) 1 g in 0.9% sodium chloride (MBP/ADV) 50 mL MBP  1 g IntraVENous Q8H    furosemide (LASIX) injection 60 mg  60 mg IntraVENous BID     No Known Allergies   Social History   Substance Use Topics    Smoking status: Never Smoker    Smokeless tobacco: Never Used    Alcohol use No      Family History   Problem Relation Age of Onset    Hypertension Mother         Review of Systems:  Review of systems not obtained due to patient factors.     Objective:   Vital Signs:    Visit Vitals    /43    Pulse 70    Temp 97.4 °F (36.3 °C)    Resp 28    Ht 5' 5\" (1.651 m)    Wt 91.6 kg (202 lb)    SpO2 99%    BMI 33.61 kg/m2       O2 Device: BIPAP   O2 Flow Rate (L/min): 3 l/min   Temp (24hrs), Av.4 °F (36.3 °C), Min:97.4 °F (36.3 °C), Max:97.4 °F (36.3 °C)       Intake/Output:   Last shift:         Last 3 shifts:    No intake or output data in the 24 hours ending 10/22/17 1451      Ventilator Settings:  Mode Rate Tidal Volume Pressure FiO2 PEEP            50 %       Peak airway pressure:      Minute ventilation: 12.6 l/min          Physical Exam:      Vitals reviewed    General: somnolent, laying in bed wearing bipap, no acute distress  HEENT:  Anicteric sclerae; pink palpebral conjunctivae; mucosa moist, PERRLA  Resp:  Symmetrical chest expansion, no accessory muscle use; no rales/ wheezing/ rhonchi noted. Pt has decreased breath sounds over left base up to long term up hemithorax  CV:  S1, S2 present; regular rate and rhythm  GI:  Abdomen soft, non-tender; (+) active bowel sounds  Extremities:  no edema/ cyanosis/ clubbing noted  Skin:  Warm; no rashes/ lesions noted  Neurologic:  Pt somonlent, unable to follow commands                                                                 Data:     Recent Results (from the past 24 hour(s))   CBC WITH AUTOMATED DIFF    Collection Time: 10/21/17  3:35 PM   Result Value Ref Range    WBC 5.1 4.6 - 13.2 K/uL    RBC 3.34 (L) 4.20 - 5.30 M/uL    HGB 11.1 (L) 12.0 - 16.0 g/dL    HCT 35.7 35.0 - 45.0 %    .9 (H) 74.0 - 97.0 FL    MCH 33.2 24.0 - 34.0 PG    MCHC 31.1 31.0 - 37.0 g/dL    RDW 13.8 11.6 - 14.5 %    PLATELET 554 820 - 200 K/uL    MPV 9.9 9.2 - 11.8 FL    NEUTROPHILS 76 (H) 40 - 73 %    LYMPHOCYTES 15 (L) 21 - 52 %    MONOCYTES 7 3 - 10 %    EOSINOPHILS 2 0 - 5 %    BASOPHILS 0 0 - 2 %    ABS. NEUTROPHILS 3.9 1.8 - 8.0 K/UL    ABS. LYMPHOCYTES 0.8 (L) 0.9 - 3.6 K/UL    ABS. MONOCYTES 0.4 0.05 - 1.2 K/UL    ABS. EOSINOPHILS 0.1 0.0 - 0.4 K/UL    ABS.  BASOPHILS 0.0 0.0 - 0.1 K/UL    DF AUTOMATED     METABOLIC PANEL, COMPREHENSIVE    Collection Time: 10/21/17  3:35 PM   Result Value Ref Range    Sodium 133 (L) 136 - 145 mmol/L    Potassium 3.2 (L) 3.5 - 5.5 mmol/L    Chloride 101 100 - 108 mmol/L    CO2 27 21 - 32 mmol/L    Anion gap 5 3.0 - 18 mmol/L    Glucose 73 (L) 74 - 99 mg/dL    BUN 16 7.0 - 18 MG/DL    Creatinine 2.92 (H) 0.6 - 1.3 MG/DL    BUN/Creatinine ratio 5 (L) 12 - 20      GFR est AA 19 (L) >60 ml/min/1.73m2    GFR est non-AA 16 (L) >60 ml/min/1.73m2    Calcium 8.0 (L) 8.5 - 10.1 MG/DL    Bilirubin, total 0.3 0.2 - 1.0 MG/DL ALT (SGPT) 10 (L) 13 - 56 U/L    AST (SGOT) 11 (L) 15 - 37 U/L    Alk.  phosphatase 214 (H) 45 - 117 U/L    Protein, total 8.0 6.4 - 8.2 g/dL    Albumin 3.4 3.4 - 5.0 g/dL    Globulin 4.6 (H) 2.0 - 4.0 g/dL    A-G Ratio 0.7 (L) 0.8 - 1.7     MAGNESIUM    Collection Time: 10/21/17  3:35 PM   Result Value Ref Range    Magnesium 2.1 1.6 - 2.6 mg/dL   CARDIAC PANEL,(CK, CKMB & TROPONIN)    Collection Time: 10/21/17  3:35 PM   Result Value Ref Range    CK 41 26 - 192 U/L    CK - MB 3.4 <3.6 ng/ml    CK-MB Index 8.3 (H) 0.0 - 4.0 %    Troponin-I, Qt. 0.11 (H) 0.0 - 0.045 NG/ML   PROTHROMBIN TIME + INR    Collection Time: 10/21/17  3:35 PM   Result Value Ref Range    Prothrombin time 13.9 11.5 - 15.2 sec    INR 1.1 0.8 - 1.2     PTT    Collection Time: 10/21/17  3:35 PM   Result Value Ref Range    aPTT 33.0 23.0 - 36.4 SEC   D DIMER    Collection Time: 10/21/17  3:35 PM   Result Value Ref Range    D DIMER 8.27 (H) <0.46 ug/ml(FEU)   CULTURE, BLOOD    Collection Time: 10/21/17  4:10 PM   Result Value Ref Range    Special Requests: NO SPECIAL REQUESTS      Culture result: NO GROWTH AFTER 13 HOURS     POC LACTIC ACID    Collection Time: 10/21/17  4:16 PM   Result Value Ref Range    Lactic Acid (POC) 0.5 0.4 - 2.0 mmol/L   EKG, 12 LEAD, INITIAL    Collection Time: 10/21/17  4:42 PM   Result Value Ref Range    Ventricular Rate 73 BPM    Atrial Rate 87 BPM    P-R Interval 64 ms    QRS Duration 140 ms    Q-T Interval 440 ms    QTC Calculation (Bezet) 484 ms    Calculated R Axis -122 degrees    Calculated T Axis -2 degrees    Diagnosis       Demand pacemaker, interpretation is based on intrinsic rhythm  Sinus rhythm with marked sinus arrhythmia with short VA with premature   ventricular complexes or fusion complexes with ventricular escape complexes  Abnormal ECG  When compared with ECG of 29-SEP-2015 10:34,  Significant changes have occurred  Confirmed by Maia Jones ([id number]) on 10/22/2017 9:55:15 AM     CULTURE, BLOOD Collection Time: 10/21/17  5:00 PM   Result Value Ref Range    Special Requests: NO SPECIAL REQUESTS      Culture result: NO GROWTH AFTER 13 HOURS     POC G3    Collection Time: 10/21/17  9:18 PM   Result Value Ref Range    Device: NASAL CANNULA      Flow rate (POC) 2.5 L/M    pH (POC) 7.030 (LL) 7.35 - 7.45      pCO2 (POC) 98.7 (H) 35.0 - 45.0 MMHG    pO2 (POC) 79 (L) 80 - 100 MMHG    HCO3 (POC) 26.0 22 - 26 MMOL/L    sO2 (POC) 86 (L) 92 - 97 %    Base deficit (POC) 7 mmol/L    Allens test (POC) YES      Total resp. rate 41      Site RIGHT RADIAL      Specimen type (POC) ARTERIAL      Performed by [employer]    POC G3    Collection Time: 10/21/17 10:44 PM   Result Value Ref Range    Device: BIPAP      FIO2 (POC) 0.30 %    pH (POC) 7.039 (LL) 7.35 - 7.45      pCO2 (POC) 100.2 (H) 35.0 - 45.0 MMHG    pO2 (POC) 75 (L) 80 - 100 MMHG    HCO3 (POC) 27.0 (H) 22 - 26 MMOL/L    sO2 (POC) 85 (L) 92 - 97 %    Base deficit (POC) 6 mmol/L    PEEP/CPAP (POC) 5.0 cmH2O    PIP (POC) 16      Allens test (POC) YES      Total resp. rate 14      Site RIGHT RADIAL      Specimen type (POC) ARTERIAL      Performed by [employer]     Spontaneous timed YES     POC G3    Collection Time: 10/22/17 12:17 AM   Result Value Ref Range    Device: BIPAP      FIO2 (POC) 0.30 %    pH (POC) 7.033 (LL) 7.35 - 7.45      pCO2 (POC) 100.5 (H) 35.0 - 45.0 MMHG    pO2 (POC) 74 (L) 80 - 100 MMHG    HCO3 (POC) 26.7 (H) 22 - 26 MMOL/L    sO2 (POC) 84 (L) 92 - 97 %    Base deficit (POC) 6 mmol/L    PEEP/CPAP (POC) 5.0 cmH2O    PIP (POC) 16      Allens test (POC) YES      Total resp.  rate 23      Site RIGHT RADIAL      Specimen type (POC) ARTERIAL      Performed by [employer]     Spontaneous timed YES     POC G3    Collection Time: 10/22/17  4:10 AM   Result Value Ref Range    Device: BIPAP      FIO2 (POC) 0.40 %    pH (POC) 7.070 (LL) 7.35 - 7.45      pCO2 (POC) 84.0 (H) 35.0 - 45.0 MMHG    pO2 (POC) 109 (H) 80 - 100 MMHG    HCO3 (POC) 24.4 22 - 26 MMOL/L    sO2 (POC) 95 92 - 97 %    Base deficit (POC) 8 mmol/L    PEEP/CPAP (POC) 5.0 cmH2O    PIP (POC) 20      Allens test (POC) YES      Total resp. rate 24      Site RIGHT RADIAL      Specimen type (POC) ARTERIAL      Performed by Ilana Greenfield     Spontaneous timed YES     METABOLIC PANEL, COMPREHENSIVE    Collection Time: 10/22/17  5:51 AM   Result Value Ref Range    Sodium 135 (L) 136 - 145 mmol/L    Potassium 3.8 3.5 - 5.5 mmol/L    Chloride 104 100 - 108 mmol/L    CO2 25 21 - 32 mmol/L    Anion gap 6 3.0 - 18 mmol/L    Glucose 82 74 - 99 mg/dL    BUN 22 (H) 7.0 - 18 MG/DL    Creatinine 3.99 (H) 0.6 - 1.3 MG/DL    BUN/Creatinine ratio 6 (L) 12 - 20      GFR est AA 14 (L) >60 ml/min/1.73m2    GFR est non-AA 11 (L) >60 ml/min/1.73m2    Calcium 8.3 (L) 8.5 - 10.1 MG/DL    Bilirubin, total 0.3 0.2 - 1.0 MG/DL    ALT (SGPT) 9 (L) 13 - 56 U/L    AST (SGOT) 7 (L) 15 - 37 U/L    Alk. phosphatase 206 (H) 45 - 117 U/L    Protein, total 7.4 6.4 - 8.2 g/dL    Albumin 3.2 (L) 3.4 - 5.0 g/dL    Globulin 4.2 (H) 2.0 - 4.0 g/dL    A-G Ratio 0.8 0.8 - 1.7     CBC WITH AUTOMATED DIFF    Collection Time: 10/22/17  5:51 AM   Result Value Ref Range    WBC 6.9 4.6 - 13.2 K/uL    RBC 3.24 (L) 4.20 - 5.30 M/uL    HGB 11.0 (L) 12.0 - 16.0 g/dL    HCT 35.1 35.0 - 45.0 %    .3 (H) 74.0 - 97.0 FL    MCH 34.0 24.0 - 34.0 PG    MCHC 31.3 31.0 - 37.0 g/dL    RDW 13.9 11.6 - 14.5 %    PLATELET 554 131 - 407 K/uL    MPV 9.7 9.2 - 11.8 FL    NEUTROPHILS 77 (H) 40 - 73 %    LYMPHOCYTES 16 (L) 21 - 52 %    MONOCYTES 6 3 - 10 %    EOSINOPHILS 1 0 - 5 %    BASOPHILS 0 0 - 2 %    ABS. NEUTROPHILS 5.3 1.8 - 8.0 K/UL    ABS. LYMPHOCYTES 1.1 0.9 - 3.6 K/UL    ABS. MONOCYTES 0.4 0.05 - 1.2 K/UL    ABS. EOSINOPHILS 0.1 0.0 - 0.4 K/UL    ABS.  BASOPHILS 0.0 0.0 - 0.1 K/UL    DF AUTOMATED     PROTHROMBIN TIME + INR    Collection Time: 10/22/17  5:51 AM   Result Value Ref Range    Prothrombin time 13.7 11.5 - 15.2 sec    INR 1.1 0.8 - 1.2     PTT Collection Time: 10/22/17  5:51 AM   Result Value Ref Range    aPTT 36.4 23.0 - 36.4 SEC   CARDIAC PANEL,(CK, CKMB & TROPONIN)    Collection Time: 10/22/17  5:51 AM   Result Value Ref Range    CK 28 26 - 192 U/L    CK - MB 2.9 <3.6 ng/ml    CK-MB Index 10.4 (H) 0.0 - 4.0 %    Troponin-I, Qt. 0.10 (H) 0.0 - 0.045 NG/ML   POC G3    Collection Time: 10/22/17 11:39 AM   Result Value Ref Range    Device: BIPAP      FIO2 (POC) 50 %    pH (POC) 7.072 (LL) 7.35 - 7.45      pCO2 (POC) 81.9 (H) 35.0 - 45.0 MMHG    pO2 (POC) 116 (H) 80 - 100 MMHG    HCO3 (POC) 23.9 22 - 26 MMOL/L    sO2 (POC) 96 92 - 97 %    Base deficit (POC) 8 mmol/L    PEEP/CPAP (POC) 5 cmH2O    PIP (POC) 22      Allens test (POC) YES      Total resp. rate 12      Site RIGHT RADIAL      Specimen type (POC) ARTERIAL      Performed by Amilcar Wilson     Spontaneous timed YES             Recent Labs      10/22/17   1139  10/22/17   0410  10/22/17   0017   FIO2I  50  0.40  0.30   HCO3I  23.9  24.4  26.7*   PCO2I  81.9*  84.0*  100.5*   PHI  7.072*  7.070*  7.033*   PO2I  116*  109*  74*         Imaging: I reviewed imaging personally -- CXR from today shows left sided large effusion with cardiomegaly, also seen on CT scan from last night as well - large effusion on left side. Assessment and plan:  1. Acute hypercarbic respiratory failure:  Etiology likely secondary to medication effects, also some component of large effusion  2. Large left pleural effusion:  Etiology unclear - fluid overload vs infectious process  3. Acute on chronic dyspnea  4. Respiratory acidosis  5. Hyponatremia  6. Hx of stroke  7. Hx of CAD    Plan:  -Continue support with bipap. Pt initially was asleep and unresponsive but when seen by Nephrology, pt awoke and answered questions.   Will continue therapy and try to improve her acute hypercarbia  -Repeat ABG serially until improvement is seen  -Agree with broad spectrum ABx - Vanc and Cefepime  -Management of renal parameters per Nephrology  -Will plan for thoracentesis today with regular labs including LDH, prot, cell count/diff, cytology, cx  -follow lytes  -repeat CXR in am  -daily labs  -hold ppx until after procedure, then with heparin SC  -does not need GI ppx    Total of 120 min critical care time spent at bedside during the course of care providing evaluation,management and care decisions and ordering appropriate treatment related to critical care problems exclusive of procedures. The reason for providing this level of medical care for this critically ill patient was due a critical illness that impaired one or more vital organ systems such that there was a high probability of imminent or life threatening deterioration in the patients condition. This care involved high complexity decision making to assess, manipulate, and support vital system functions, to treat this degree vital organ system failure and to prevent further life threatening deterioration of the patients condition. Signed By: Chandan Tucker MD     October 22, 2017        Addendum:  ABG repeated, pt still very hypercarbic with resp acidosis and lethargic, plan for patient to be intubated for hypercarbic resp failure. Updated patient's daughters and consent placed in chart for thoracentesis. Pt intubated but required repeat attempt with anesthesia due to edema in neck and mouth with oral secretions, pt hypoxic but corrected with vigorous bag mask ventilation. After intubation, CXR showed RUL atelectatic, so bronchoscopy performed for airway clearance. See procedure notes for details.     Signed By: Chandan Tucker MD     October 22, 2017

## 2017-10-22 NOTE — H&P
Hospitalist Admission History and Physical    NAME:  Akiko Salmeron   :   1950   MRN:   [id number]     PCP:  Chad Agrawal MD  Date/Time:  10/21/2017 9:43 PM  Subjective:   CHIEF COMPLAINT:  Shortness of breath    HISTORY OF PRESENT ILLNESS:     Rigo Gallardo is a 75 y.o. female with history of ESRD on HD presents with a one month history of dyspnea which worsened significantly today. She was brought over to the ER and was found to have a very large left pleural effusion which pretty much has occupied the entire pleural cavity. Patient was started on BiPAP and ABG shows pH 7.03 with PCO2 100. She was very obtunded only being partially aroused to sternal rub during my exam.  She is a DNR but her family is requesting intubation if not improving but no resuscitation. Case discussed with Dr Sd Ramachandran who has been consulted for ICU care. Past Medical History:   Diagnosis Date    Cardiac echocardiogram 2014    Sm LV cavity. Hyperdynamic LV function. EF 75-80%. No WMA. Severe conc LVH. RVSP 25-30 mmHg. Mod-marked LAE. Mild MR.  Carotid duplex 2005    No significant occlusive disease bilaterally.  Chronic kidney disease     Hypertension     Murmur     Second degree AV block     Medtronic dual-chamber permanent pacemaker    [facility])         Past Surgical History:   Procedure Laterality Date    HX TUBAL LIGATION         Social History   Substance Use Topics    Smoking status: Never Smoker    Smokeless tobacco: Never Used    Alcohol use No        Family History   Problem Relation Age of Onset    Hypertension Mother         No Known Allergies     Prior to Admission Medications   Prescriptions Last Dose Informant Patient Reported? Taking? albuterol (PROVENTIL HFA, VENTOLIN HFA, PROAIR HFA) 90 mcg/actuation inhaler   Yes No   Sig: Take 2 Puffs by inhalation every four (4) hours as needed for Wheezing.    hydrALAZINE (APRESOLINE) 25 mg tablet Yes No   Sig: Take 50 mg by mouth three (3) times daily. metoprolol (LOPRESSOR) 50 mg tablet   Yes No   Si mg two (2) times a day. ondansetron hcl (ZOFRAN, AS HYDROCHLORIDE,) 4 mg tablet   No No   Sig: Take 2 tablets by mouth every eight (8) hours as needed for Nausea. oxyCODONE-acetaminophen (PERCOCET) 5-325 mg per tablet   Yes No   sevelamer carbonate (RENVELA) 800 mg tab tab   No No   Sig: Take 1 Tab by mouth three (3) times daily (with meals). warfarin (COUMADIN) 5 mg tablet   Yes No      Facility-Administered Medications: None       REVIEW OF SYSTEMS:     [x] Unable to obtain  ROS due to  [x]mental status change  []sedated   []intubated        Objective:   VITALS:    Visit Vitals    /64 (BP 1 Location: Right arm, BP Patient Position: At rest)    Pulse 97    Temp 97.4 °F (36.3 °C)    Resp (!) 39    SpO2 99%     Temp (24hrs), Av.4 °F (36.3 °C), Min:97.4 °F (36.3 °C), Max:97.4 °F (36.3 °C)      PHYSICAL EXAM:   General:    Morbid obesity. Not Alert in respiratory distress     Head:   Normocephalic, without obvious abnormality, atraumatic. Eyes:   Conjunctivae clear, anicteric sclerae. Pupils are equal  Nose:  Nares normal. No drainage or sinus. Throat:    On BiPAP could not be assessed  Lungs:   Almost no breath sounds left side. Heart:   S1-S2 (+) Regular rate and rhythm, systolic murmur. .  Abdomen:   Obese soft, BS present. Not distended. Extremities: Extremities normal, atraumatic, No cyanosis. No edema. No clubbing  Skin:     Texture, turgor normal. No rashes or lesions.   Not Jaundiced  Psych:  Unresponsive can't assess  Neurologic: Unresponsive [place] 6      LAB DATA REVIEWED:    No components found for: [place]  Recent Labs      10/21/17   1535   NA  133*   K  3.2*   CL  101   CO2  27   BUN  16   CREA  2.92*   GLU  73*   CA  8.0*   ALB  3.4   WBC  5.1   HGB  11.1*   HCT  35.7   PLT  169         IMAGING RESULTS:   [x]  I have personally reviewed the actual   [x]CXR  []AXR [x]CT scan  [] MRI    CXR:  1.  Large left pleural effusion which is potentially loculated. 2.  Mild interstitial pulmonary edema. CT chest:  Nondiagnostic for PE. Please see above. .   Large left effusion, and resultant near complete collapse of the left lung. Small to moderate right effusion and partial collapse of the dependent right  lower lobe. Global cardiomegaly with significant enlargement of the right atrium. End-stage  kidney disease. Assessment/Plan:      1. Acute hypercapnic respiratory failure  2. Large left pleural effusion  3. Acute metabolic encephalopathy  4. Acute respiratory acidosis  5. Hyponatremia  6. Hypokalemia  7. ESRD on HD  8. Elevated troponin likely ESRD vs NSTEMI due to demand ischemia  ___________________________________________________  PLAN:    Risk of deterioration:  []Low    []Moderate  [x]High    -  Will admit for further workup and treatment in the ICU  -  Administer ABX therapy  -  Supportive care  -  Vitals per protocol   -  HD per nephro  -  Despite DNR the pt may require intubation if needed per family. Meds:   -  Oxygen per protocol  -  Administer IV antibiotic to cover for possible PNA being a HD pt will use HCAP coverage  -  Bronchodilators  -  Lasix  Labs:  CMP, CBC/DIFF, troponin, ABG  Drug levels: n/a  Cultures:  BCX  Extras:  Monitor CXRs  Consult(s):   -  Pulmonary  -  Nephrology for HD  -  Pharmacy to monitor and adjust meds if indicated. **Chart reviewed including medications, vitals, notes, labs and pertinent studies. **Assessment, condition, and plan reviewed and all questions and concerns addressed with family.       Patient will need to receive treatment that can only be done on an inpatient basis which will require at 2 or more midnights due to risk of possible complications from respiratory failure to include worsening respiratory failure and/or death    Prophylaxis:  []Lovenox  []Coumadin  [x]Hep SQ  []SCDs  [x]H2B/PPI    Disposition:  []Home w/ Family   [x]HH PT,OT,RN   []SNF/LTC   []SAH/Rehab    Discussed Code Status:    []Full Code      [x]DNR      []DNI     ___________________________________________________    Care Plan discussed with:    []   [x]Family    []ED Care Manager  []ED Doc   []Specialist :    Total Time Coordinating Admission:      minutes    []Total Critical Care Time:     ___________________________________________________  Admitting Physician: Marcellus Duffy MD

## 2017-10-22 NOTE — ED NOTES
Moved patient from ED bed 11 to ED 2. Provided bedside report to Sabrina Richards RN. Pt is on bipap. Pt has IV access, on monitor. Pt arouses to deep sternal rub. MD is aware of patients mental status change.  Currently awaiting update from hospitalist and ICU MD.

## 2017-10-22 NOTE — ED NOTES
Sanz catheter placement failed, RN met resistance, pt complained of pain and demanded sanz to be taken out. Attempted by Nicanor Joseph RN.

## 2017-10-22 NOTE — ED NOTES
RT called for readjustment on bipap, pt o2 dropped to 88% on bipap, serge adjusted peep , pt o2 WNL now , continue to monitor pt

## 2017-10-22 NOTE — ED NOTES
Dr. Shelly Austin informed that pt has radial HR of 42 bpm.  Pt responsive to pain, and pt has been hypotensive. No further orders at this time.

## 2017-10-22 NOTE — CONSULTS
Consult Note  Consult requested by: Dr. Patricia Klinefelter is a 75 y.o. female [address] who is being seen on consult for ESRD/HD  Chief Complaint   Patient presents with    Shortness of Breath     Admission diagnosis: <principal problem not specified>     HPI: 74 yo AA female sent to ER after HD yesterday for SOB. She was found to have pleural effusion on CXR and CTA was done which r/o PE but did show significant bilateral pleural effusion with almost complete collapse of the left and near collapse of the right. She had to have Ativan for this procedure because of claustrophobia and reversed with Flumazenil when they had a hard time waking her up. Daughter and niece at bedside. Patient is awake but not able to provide any hx. Family reports she has been SOB for about a month, no report of fever or chills. She dis have issues with poorly functioning left arm avg and had clotted and been declotted a couple of times in the last 1 month. She has a right femoral HD at this time. She also has chronic hypotension and it is always difficult to do UF. She received a small bolus of NS ( 250 cc) and BP is sl better. She is currently on BIPAP, able to open eyes when called and follow simple commands. Past Medical History:   Diagnosis Date    Cardiac echocardiogram 09/03/2014    Sm LV cavity. Hyperdynamic LV function. EF 75-80%. No WMA. Severe conc LVH. RVSP 25-30 mmHg. Mod-marked LAE. Mild MR.  Carotid duplex 04/26/2005    No significant occlusive disease bilaterally.     Chronic kidney disease     Hypertension     Murmur 2005    Second degree AV block 2014    Medtronic dual-chamber permanent pacemaker    Stroke [facility])       Past Surgical History:   Procedure Laterality Date    HX TUBAL LIGATION         Social History     Social History    Marital status: SINGLE     Spouse name: N/A    Number of children: N/A    Years of education: N/A     Occupational History    Not on file. Social History Main Topics    Smoking status: Never Smoker    Smokeless tobacco: Never Used    Alcohol use No    Drug use: Not on file    Sexual activity: No     Other Topics Concern    Not on file     Social History Narrative       Family History   Problem Relation Age of Onset    Hypertension Mother      No Known Allergies     Home Medications:     Prior to Admission Medications   Prescriptions Last Dose Informant Patient Reported? Taking? albuterol (PROVENTIL HFA, VENTOLIN HFA, PROAIR HFA) 90 mcg/actuation inhaler   Yes No   Sig: Take 2 Puffs by inhalation every four (4) hours as needed for Wheezing. hydrALAZINE (APRESOLINE) 25 mg tablet   Yes No   Sig: Take 50 mg by mouth three (3) times daily. metoprolol (LOPRESSOR) 50 mg tablet   Yes No   Si mg two (2) times a day. ondansetron hcl (ZOFRAN, AS HYDROCHLORIDE,) 4 mg tablet   No No   Sig: Take 2 tablets by mouth every eight (8) hours as needed for Nausea. oxyCODONE-acetaminophen (PERCOCET) 5-325 mg per tablet   Yes No   sevelamer carbonate (RENVELA) 800 mg tab tab   No No   Sig: Take 1 Tab by mouth three (3) times daily (with meals).    warfarin (COUMADIN) 5 mg tablet   Yes No      Facility-Administered Medications: None       Current Facility-Administered Medications   Medication Dose Route Frequency    famotidine (PF) (PEPCID) 20 mg in sodium chloride 0.9 % 10 mL injection  20 mg IntraVENous Q12H    VANCOMYCIN INFORMATION NOTE   Other Rx Dosing/Monitoring    [START ON 10/23/2017] Vancomycin random level due at 04:00 on 10-   Other ONCE    sodium chloride 0.9 % bolus infusion 250 mL  250 mL IntraVENous ONCE    diphenhydrAMINE (BENADRYL) injection 12.5 mg  12.5 mg IntraVENous Q4H PRN    ondansetron (ZOFRAN) injection 4 mg  4 mg IntraVENous Q4H PRN    acetaminophen (TYLENOL) suppository 650 mg  650 mg Rectal Q4H PRN    heparin (porcine) injection 5,000 Units  5,000 Units SubCUTAneous Q12H    albuterol-ipratropium (DUO-NEB) 2.5 MG-0.5 MG/3 ML  3 mL Nebulization Q6H RT    cefepime (MAXIPIME) 1 g in 0.9% sodium chloride (MBP/ADV) 50 mL MBP  1 g IntraVENous Q8H    furosemide (LASIX) injection 60 mg  60 mg IntraVENous BID     Current Outpatient Prescriptions   Medication Sig    hydrALAZINE (APRESOLINE) 25 mg tablet Take 50 mg by mouth three (3) times daily.  warfarin (COUMADIN) 5 mg tablet     oxyCODONE-acetaminophen (PERCOCET) 5-325 mg per tablet     metoprolol (LOPRESSOR) 50 mg tablet 50 mg two (2) times a day.  albuterol (PROVENTIL HFA, VENTOLIN HFA, PROAIR HFA) 90 mcg/actuation inhaler Take 2 Puffs by inhalation every four (4) hours as needed for Wheezing.  ondansetron hcl (ZOFRAN, AS HYDROCHLORIDE,) 4 mg tablet Take 2 tablets by mouth every eight (8) hours as needed for Nausea.  sevelamer carbonate (RENVELA) 800 mg tab tab Take 1 Tab by mouth three (3) times daily (with meals). Review of Systems:   Pertinent items are noted in HPI. Data Review:    Labs: Results:       Chemistry Recent Labs      10/22/17   0551  10/21/17   1535   GLU  82  73*   NA  135*  133*   K  3.8  3.2*   CL  104  101   CO2  25  27   BUN  22*  16   CREA  3.99*  2.92*   CA  8.3*  8.0*   AGAP  6  5   BUCR  6*  5*   AP  206*  214*   TP  7.4  8.0   ALB  3.2*  3.4   GLOB  4.2*  4.6*   AGRAT  0.8  0.7*      CBC w/Diff Recent Labs      10/22/17   0551  10/21/17   1535   WBC  6.9  5.1   RBC  3.24*  3.34*   HGB  11.0*  11.1*   HCT  35.1  35.7   PLT  164  169   GRANS  77*  76*   LYMPH  16*  15*   EOS  1  2      Coagulation Recent Labs      10/22/17   0551  10/21/17   1535   PTP  13.7  13.9   INR  1.1  1.1   APTT  36.4  33.0       Iron/Ferritin No results for input(s): IRON in the last 72 hours. No lab exists for component: TIBCCALC   BNP No results for input(s): BNPP in the last 72 hours.    Cardiac Enzymes Recent Labs      10/22/17   0551  10/21/17   1535   CPK  28  41   CKND1  10.4*  8.3*      Liver Enzymes Recent Labs      10/22/17 0551   TP  7.4   ALB  3.2*   AP  206*   SGOT  7*      Thyroid Studies Lab Results   Component Value Date/Time    TSH 0.89 09/03/2014 07:40 AM           IMAGES: CXR and CTA report noted    CULTURE: Blood c/s pend      Physical Assessment:     Visit Vitals    BP (!) 88/41    Pulse 63    Temp 97.4 °F (36.3 °C)    Resp 23    Ht 5' 5\" (1.651 m)    Wt 91.6 kg (202 lb)    SpO2 96%    BMI 33.61 kg/m2     Last 3 Recorded Weights in this Encounter    10/21/17 2235   Weight: 91.6 kg (202 lb)     No intake or output data in the 24 hours ending 10/22/17 1046    Physial Exam:  General appearance: lethargic, opens eyes when called, BIPAP in place, obese, appears stated age  Skin: no rash  HEENT: non icteric, pinkish conj  Neck: No JVD  Lungs: dec air entry bilaterally, PPM left  Heart: regular rate and rhythm, no rub  Abdomen: soft, non-tender. BS +  Extremities: trace LE edema, Right femoral HD cath    IMPRESSION AND PLAN:   ESRD no acute need for HD today, can sched HD in AM if hemodynamically stable. SOB is most likely from lung collapse and would benefit more from drainage and or intubation than dialysis. Discussed with Dr Emma Jackson (ER) and Dr. Raissa Baldwin (ICU). Fem HD cath care. Hypotension, chronic but could have superimposed acute component from current pulmo compromise/infection. ok with gentle IV hydration /vaspressor if needed  Anemia from CKD cont epo with HD  Bilateral pleural effusion Pulmo consult done. Empiric antibiotics       Thank you will follow with you.     Luisa Felix MD  October 22, 2017

## 2017-10-22 NOTE — ED NOTES
Pt hyptensive dr Christa Moreno and dr Ciara Zimmerman paged and called backed, TORB to bolus patient with 250 ns bolus times 2 to maintain pressure

## 2017-10-23 NOTE — PROGRESS NOTES
NUTRITION    Nutrition Screen      RECOMMENDATIONS / PLAN:     - Start tube feeding of Nepro at 20 mL/hr and advance as tolerated by 10 mL q 6 hours to goal rate of 45 mL/hr with 100 mL q 6 hour water flushes.   - Continue RD inpatient monitoring and evaluation. Goal Regimen: Nepro at 45 mL/hr + 100 mL q 6 hour water flushes to provide: 1944 kcal, 87 gm protein, 104 gm fat, 180 gm CHO, 17 gm fiber, 783 mL free water, 1183 mL total water, 100% RDIs     NUTRITION INTERVENTIONS & DIAGNOSIS:     [x] Enteral nutrition support: start  [x] Coordination of care: interdisciplinary rounds, discussed starting feeds with MD.      Nutrition Diagnosis: Inadequate oral intake related to inability to tolerate po as evidenced by pt NPO. ASSESSMENT:     Intubated 10/22. S/p thoracentesis and bronch. ESRD on HD. NGT to low continuous suction, will start feeds today. Average po intake adequate to meet patients estimated nutritional needs:   [] Yes     [x] No   [] Unable to determine at this time    Diet:   NPO     Food Allergies: NKFA  Current Appetite:   [] Good     [] Fair     [] Poor     [x] Other: NPO  Appetite/meal intake prior to admission:   [] Good     [] Fair     [] Poor     [x] Other: unknown  Feeding Limitations:  [] Swallowing difficulty    [] Chewing difficulty    [x] Other: intubated  Current Meal Intake: No data found.     Anuric   BM: 10/21   Skin Integrity: WDL; chest tube   Edema: none   Pertinent Medications: Reviewed: Zofran, pepcid      Recent Labs      10/23/17   0150  10/22/17   0551  10/21/17   1535   NA  136  135*  133*   K  3.6  3.8  3.2*   CL  107  104  101   CO2  16*  25  27   GLU  76  82  73*   BUN  30*  22*  16   CREA  4.71*  3.99*  2.92*   CA  8.4*  8.3*  8.0*   MG   --    --   2.1   ALB  2.7*  3.2*  3.4   SGOT  16  7*  11*   ALT  9*  9*  10*       Intake/Output Summary (Last 24 hours) at 10/23/17 1003  Last data filed at 10/23/17 0700   Gross per 24 hour   Intake           722.88 ml   Output 1600 ml   Net          -877.12 ml       Anthropometrics:  Ht Readings from Last 1 Encounters:   10/22/17 5' 5\" (1.651 m)     Last 3 Recorded Weights in this Encounter    10/21/17 2235 10/23/17 0400 10/23/17 0600   Weight: 91.6 kg (202 lb) 77.2 kg (170 lb 3.1 oz) 77.2 kg (170 lb 3.1 oz)     Body mass index is 28.32 kg/(m^2). Overweight     Weight History:   Weight Metrics 10/23/2017 9/21/2017 4/17/2017 2/17/2016 9/29/2015 2/3/2015 11/10/2014   Weight 170 lb 3.1 oz 176 lb 2.4 oz 168 lb 180 lb 8.9 oz 155 lb 6.8 oz 167 lb 172 lb   BMI 28.32 kg/m2 28.43 kg/m2 26.31 kg/m2 28.27 kg/m2 24.34 kg/m2 26.97 kg/m2 27.77 kg/m2        Admitting Diagnosis: Pleural effusion  Acute respiratory failure (HCC)  ESRD on dialysis (HCC)  Acute respiratory failure (HCC)  ESRD (end stage renal disease) (HCC)  Pleural effusion  Respiratory failure (HCC)  Altered mental status  Pertinent PMHx: ESRD on HD, HTN, stroke     Education Needs:        [x] None identified  [] Identified - Not appropriate at this time  []  Identified and addressed - refer to education log  Learning Limitations:   [] None identified  [x] Identified: intubated     Cultural, Episcopalian & ethnic food preferences:  [x] None identified    [] Identified and addressed     ESTIMATED NUTRITION NEEDS:     Calories: 6854-0063 kcal ([id number]) based on  [x] Actual BW 77 kg     [] IBW   Protein:  gm (1.2-2 gm/kg) based on  [x] Actual BW      [] IBW   Fluid: 0926-9304 mL     MONITORING & EVALUATION:     Nutrition Goal(s):   1. Nutritional needs will be met through adequate oral intake or nutrition support within the next 7 days.   Outcome:  [] Met/Ongoing    []  Not Met    [x] New/Initial Goal     Monitoring:   [x] EN tolerance   [x] EN infusion   [] Supplement intake   [x] GI symptoms/ability to tolerate po diet   [x] Respiratory status   [x] Plan of care      Previous Recommendations (for follow-up assessments only):     []   Implemented       []   Not Implemented (RD to address)     [] No Recommendation Made     Discharge Planning: pending ability to tolerate po and plan of care  [x] Participated in care planning, discharge planning, & interdisciplinary rounds as appropriate      Rafy Herndon, [address], [address] [place] [address]    Pager: [phone]

## 2017-10-23 NOTE — ROUTINE PROCESS
ABGS DRAWN BY RESP TECH AND VENT SETTINGS ADJUSTED Accordingly. REPEAT Portable CXR DONE. Fentanyl gtt at 50mcg/min and BOTH Periph IV sites w/o sign of infiltration.

## 2017-10-23 NOTE — PROCEDURES
[facility]  Pulmonary, Critical Care, and Sleep Medicine    Name: Eugene Baumann MRN: [id number]   : 1950 Hospital: [address] [place] [address]   Date: 10/22/2017        Thoracentesis Procedure Note -- With Ultrasound    PROCEDURE:  DIAGNOSTIC and THERAPEUTIC THORACENTESIS  CPT code: With Ultrasound imaging- 37564    INDICATION:  PLEURAL EFFUSION    ANESTHESIA:  LOCAL ANESTHESIA WITH 1% LIDOCAINE      CHEST ULTRASOUND FINDINGS:  Ultrasound was used to image the chest and localize the large left pleural effusion. DESCRIPTION OF PROCEDURE:  After obtaining informed consent and localizing the safest location for thoracentesis, the  left, 5th intercostal space was marked with a blunt, plastic needle cap in the mid scapular line. A thoracentesis kit was used to perform the procedure. The skin was prepped and draped in the usual sterile fashion. Using the previously marked location as a giude, 1% lidocaine was injected into the skin and subcutaneous tissue, as well as onto the underlying rib and inter-costal muscles, pleural fluid was aspirated to assure proper location, prior to removing the anesthesia needle. The needle and thoracentesis catheter were then introduced into the chest at the localized site over the lower rib the rib localized with the needle, and the catheter then marched over the rib into the pleural space. After aspirating fluid, the thoracentesis catheter was then placed into the chest using the needle itself as a trocar. The needle was then removed and the catheter was attached to the supplied tubing without complication. A total of 1300 cc of serosanginous fluid, was aspirated and sent for analysis. The patient tolerated the procedure well without complications. Post procedure CXR is pending.   Estimated blood loss less than 100 ml    Signed By: Kimberly Clayton MD     2017

## 2017-10-23 NOTE — PROCEDURES
[facility]  Pulmonary, Critical Care, and Sleep Medicine    Name: Saumya Adams MRN: [id number]   : 1950 Hospital: [address] [place] [address]   Date: 10/22/2017        Bronchoscopy Report    Procedure: Therapeutic bronchoscopy. Indication: Abnormal chest imaging and Mucus Plugging    Consent/Treatment: Informed consent was obtained from the  family after risks, benefits and alternatives were explained. Timeout verified the correct patient and correct procedure. Anesthesia:   Patient on ventilator and receiving  Propofol drip at 35mcg     Procedure Details:   -- The bronchoscope was introduced through an endotracheal tube. -- There was significant tracheobronchomalacia. -- No endobronchial lesions were encountered  -- The right-sided endobronchial anatomy was completely inspected and was found to be normal.  -- The left-sided endobronchial anatomy was completely inspected and was found to be normal.     Specimens:    The bronchoscope was wedged in the LLL and bronchoalveolar lavage was performed; material was sent for  microbiology, cytology, AFB smear and culture and fungal culture    Complications: none    Estimated Blood Loss: none    Destiny Fuller MD  2017  8:09 PM

## 2017-10-23 NOTE — PROGRESS NOTES
Patient admitted from ER, called anaesthesia for intubation, started on propofol and also ordered for fentanyl drip, bronch ed at bedside respiratory sputum sent to lab, then followed by thoracentesis, 1.2 liter of brownish colored pleural fluid noted, specimens sent to lab, left upper arm av fistula no thrill/ bruit noted, right upper thigh HD cath placed prior to admission, sara le noted atrophy with poor circulation and crusty looking toes, ? Toes missing?

## 2017-10-23 NOTE — PROGRESS NOTES
RENAL DAILY PROGRESS NOTE    Subjective:   Admitted for SOB seen for ESRD and HD    Complaint: events from yesterday noted. She remains on vent, very awake and following commands, daughter at bedside.     Current Facility-Administered Medications   Medication Dose Route Frequency    chlorhexidine (PERIDEX) 0.12 % mouthwash 10 mL  10 mL Oral BID    glucose chewable tablet 16 g  4 Tab Oral PRN    glucagon (GLUCAGEN) injection 1 mg  1 mg IntraMUSCular PRN    dextrose (D50W) injection syrg 12.5-25 g  25-50 mL IntraVENous PRN    midazolam (VERSED) injection 1-2 mg  1-2 mg IntraVENous Q4H PRN    [START ON 10/24/2017] Vancomycin Random Level  1 Each Other ONCE    [START ON 10/24/2017] cefepime (MAXIPIME) 500 g in 0.9% sodium chloride 100 mL IVPB  500 g IntraVENous Q24H    famotidine (PF) (PEPCID) 20 mg in sodium chloride 0.9 % 10 mL injection  20 mg IntraVENous Q24H    VANCOMYCIN INFORMATION NOTE   Other Rx Dosing/Monitoring    fentaNYL (PF) 10 mcg/mL infusion  0-200 mcg/hr IntraVENous TITRATE    diphenhydrAMINE (BENADRYL) injection 12.5 mg  12.5 mg IntraVENous Q4H PRN    ondansetron (ZOFRAN) injection 4 mg  4 mg IntraVENous Q4H PRN    acetaminophen (TYLENOL) suppository 650 mg  650 mg Rectal Q4H PRN    heparin (porcine) injection 5,000 Units  5,000 Units SubCUTAneous Q12H    albuterol-ipratropium (DUO-NEB) 2.5 MG-0.5 MG/3 ML  3 mL Nebulization Q6H RT           Objective:   Patient Vitals for the past 24 hrs:   Temp Pulse Resp BP SpO2   10/23/17 1055 - - - - 100 %   10/23/17 1000 - 62 20 123/64 100 %   10/23/17 0900 - 64 20 120/75 -   10/23/17 0800 98.7 °F (37.1 °C) 69 20 113/63 100 %   10/23/17 0500 - 78 22 126/65 100 %   10/23/17 0400 97.8 °F (36.6 °C) 70 20 97/55 100 %   10/23/17 0313 - 60 22 - 100 %   10/23/17 0300 - 69 22 103/48 -   10/23/17 0200 - 75 21 104/56 100 %   10/23/17 0106 - - - - 100 %   10/23/17 0100 - 69 22 125/58 -   10/23/17 0030 - 67 22 118/54 100 %   10/23/17 0015 - 68 22 (!) 123/106 -   10/23/17 0000 97.6 °F (36.4 °C) 67 22 122/49 100 %   10/22/17 2345 - 68 19 116/52 100 %   10/22/17 2330 - 69 22 124/49 -   10/22/17 2327 - 68 22 - 100 %   10/22/17 2315 - 69 22 110/69 -   10/22/17 2300 - 72 22 116/52 -   10/22/17 2245 - 65 21 116/56 -   10/22/17 2230 - 68 21 92/48 -   10/22/17 2215 - 70 22 98/45 100 %   10/22/17 2200 - 66 22 93/53 100 %   10/22/17 2145 - 66 22 101/50 -   10/22/17 2130 - 71 22 (!) 60/34 100 %   10/22/17 2115 - 68 19 (!) 41/32 -   10/22/17 2100 - 60 22 97/60 -   10/22/17 2045 - 61 26 92/64 -   10/22/17 2042 - 74 26 - 100 %   10/22/17 2040 - - - - 100 %   10/22/17 2030 - 74 26 - -   10/22/17 2015 - 60 23 98/53 100 %   10/22/17 2000 - 81 17 103/74 100 %   10/22/17 1945 - 60 26 113/83 -   10/22/17 1930 - 60 26 (!) 89/43 -   10/22/17 1915 - 60 26 - -   10/22/17 1900 - 61 25 (!) 81/46 -   10/22/17 1730 96 °F (35.6 °C) - - - -   10/22/17 1720 - - 26 - 100 %   10/22/17 1530 - 68 - 119/49 100 %   10/22/17 1515 - 71 - 137/60 99 %   10/22/17 1509 - - - - 100 %   10/22/17 1500 - 72 - 128/51 99 %   10/22/17 1445 - 69 20 123/58 -   10/22/17 1430 - 67 20 121/68 99 %   10/22/17 1415 - 69 20 111/60 99 %   10/22/17 1400 - 70 20 91/46 98 %   10/22/17 1345 - 71 24 96/47 98 %   10/22/17 1330 - 67 23 95/50 98 %        Weight change: -14.427 kg (-31 lb 12.9 oz)     10/21 1901 - 10/23 0700  In: 722.9 [I.V.:722.9]  Out: 1600     Intake/Output Summary (Last 24 hours) at 10/23/17 1326  Last data filed at 10/23/17 0800   Gross per 24 hour   Intake           722.88 ml   Output             1600 ml   Net          -877.12 ml     Physical Exam: awake, intubated, appears in no distress    HEENT: non icteric, ET/NGT in place  Neck: No JVD  Cardiovascular: regular no rub  C/L: bilateral vent breath sounds, no rales/wheezing,  left CT to drain  Abdomen: soft + BS  Ext: Right femoral HD cath, no LE edema    Data Review:     LABS:   Hematology: Recent Labs      10/23/17   0150  10/22/17   0551  10/21/17   1535   WBC 8.2  6.9  5.1   HGB  10.5*  11.0*  11.1*   HCT  32.3*  35.1  35.7   Pl 131K  Chemistry: Recent Labs      10/23/17   0150  10/22/17   0551  10/21/17   1535   BUN  30*  22*  16   CREA  4.71*  3.99*  2.92*   CA  8.4*  8.3*  8.0*   ALB  2.7*  3.2*  3.4   K  3.6  3.8  3.2*   NA  136  135*  133*   CL  107  104  101   CO2  16*  25  27   GLU  76  82  73*    Vanco random 21.3  Blood c/s neg so far    IMPRESSION AND PLAN:   ESRD sched HD tomorrow on call  Hypotension better cont to monitor  Anemia cont Epo with HD  2nd HPTH cont with hectorol with HD, check phos  Bilateral pleural effusion, management per Sunitha Wall MD  10/23/2017

## 2017-10-23 NOTE — CONSULTS
[facility] - Consult Note    Consultation request by Cookie Arana MD for advice/opinion related to evaluating Pleural effusion  Acute respiratory failure ([facility] [address]  ESRD on dialysis [facility])  Acute respiratory failure ([address]  ESRD (end stage renal disease) ([address]  Pleural effusion  Respiratory failure ([address]  Altered mental status    Date of  Admission: 10/21/2017  2:46 PM   Primary Care Physician:  Abdoulaye Betancourt MD     Assessment:     Patient Active Problem List   Diagnosis Code    Renal failure N19    UTI (urinary tract infection) N39.0    Anemia requiring transfusions D64.9    End stage renal failure untreated by renal replacement therapy ([facility] [address] N18.6    Near syncope R55    HCVD (hypertensive cardiovascular disease) I11.9    Second degree heart block I44.1    Vomiting R11.10    Diarrhea R19.7    Abdominal pain R10.9    Cough R05    Myalgia M79.1    Gastroenteritis K52.9    End-stage renal disease needing dialysis ([facility] [address] N18.6, Z99.2    DVT (deep venous thrombosis) (HCC) I82.409    High-grade AV block I44.30    Essential hypertension I10    Clotted dialysis access [facility]) T82.49XA    Arteriovenous fistula thrombosis ([facility] [address] X99.743R    Acute respiratory failure (HCC) J96.00    Pleural effusion J90    ESRD on dialysis ([address] N18.6, Z99.2    ESRD (end stage renal disease) ([facility] [address] N18.6    Respiratory failure ([facility] [address] J96.90    Altered mental status R41.82     -Acute hypercarbic respiratory failure with respiratory acidosis, intubated  -NSVT in setting of respiratory failure, PTX, pleural effusion  -Large left pleural effusion, pending cardiothoracic surgery evaluation  -PTX  -Hx high-grade 2nd degree AV block. Pt with documented Mobitz type II, second-degree AV block and 2:1 AV block with near syncope.   -S/p PPM placement - Medtronic Adapta [id number]  -Hx HTN  -ESRD, on HD T/R/S   -Anemia of chronic disease  -Secondary hyperparathyroidism of CKD  -Hx upper extremity DVT  -Quadriplegic, bed bound   -History of CVA with right sided weakness   -HCVD with severe LVH and normal EF 65% 2011, hyperdynamic LF function 75-80% on echo this admission   -Hx sleep apnea, but apparently improved with wt loss     Plan:     -Will have pacemaker interrogated (Medtronic)  -Will start on Amiodarone  -Check magnesium  -Check echo       History of Present Illness: This is a 75 y.o. female admitted for Pleural effusion  Acute respiratory failure ([facility] [address]  ESRD on dialysis ([facility] [address]  Acute respiratory failure ([facility] [address]  ESRD (end stage renal disease) ([facility] [address]  Pleural effusion  Respiratory failure ([facility] [address]  Altered mental status. Patient complains of:  Shortness of breath    Pt is a 74 yo F who presented to the ER 2 days ago with intermittent shortness of breath x 1 month. Pt reported worsened dyspnea with exposure to irritants such as cigarette smoke or dust and that her inhaler only provided temporary relief. She only uses O2 while at dialysis. She also presented with cough, tremors, and decreased appetite for a few days, no fever. Pt was intubated yesterday evening after BiPAP failure. She was noted to have suspected V-tach on telemetry, thus cardiology was consulted. Cardiac risk factors: smoking/ tobacco exposure, sedentary life style, hypertension      Review of Symptoms:      Review of systems not obtained due to patient factors. Pt is intubated. Past Medical History:     Past Medical History:   Diagnosis Date    Cardiac echocardiogram 09/03/2014    Sm LV cavity. Hyperdynamic LV function. EF 75-80%. No WMA. Severe conc LVH. RVSP 25-30 mmHg. Mod-marked LAE. Mild MR.  Carotid duplex 04/26/2005    No significant occlusive disease bilaterally.     Chronic kidney disease     Hypertension     Murmur 2005    Second degree AV block 2014    Medtronic dual-chamber permanent pacemaker    Stroke [facility])          Social History:     Social History     Social History    Marital status: SINGLE     Spouse name: N/A    Number of children: N/A    Years of education: N/A     Social History Main Topics    Smoking status: Never Smoker    Smokeless tobacco: Never Used    Alcohol use No    Drug use: None    Sexual activity: No     Other Topics Concern    None     Social History Narrative        Family History:     Family History   Problem Relation Age of Onset    Hypertension Mother         Medications:   No Known Allergies     Current Facility-Administered Medications   Medication Dose Route Frequency    chlorhexidine (PERIDEX) 0.12 % mouthwash 10 mL  10 mL Oral BID    glucose chewable tablet 16 g  4 Tab Oral PRN    glucagon (GLUCAGEN) injection 1 mg  1 mg IntraMUSCular PRN    dextrose (D50W) injection syrg 12.5-25 g  25-50 mL IntraVENous PRN    midazolam (VERSED) injection 1-2 mg  1-2 mg IntraVENous Q4H PRN    [START ON 10/24/2017] Vancomycin Random Level  1 Each Other ONCE    [START ON 10/24/2017] cefepime (MAXIPIME) 500 g in 0.9% sodium chloride 100 mL IVPB  500 g IntraVENous Q24H    famotidine (PF) (PEPCID) 20 mg in sodium chloride 0.9 % 10 mL injection  20 mg IntraVENous Q24H    0.9% sodium chloride infusion  100 mL/hr IntraVENous DIALYSIS PRN    albumin human 25% (BUMINATE) solution 12.5 g  12.5 g IntraVENous DIALYSIS PRN    [START ON 10/24/2017] doxercalciferol (HECTOROL) 4 mcg/2 mL injection 6 mcg  6 mcg IntraVENous DIALYSIS TUE, THU & SAT    alteplase (CATHFLO) 2 mg in sterile water (preservative free) 2 mL injection  2 mg InterCATHeter ONCE PRN    heparin (porcine) 1,000 unit/mL injection 1,000 Units  1,000 Units InterCATHeter DIALYSIS PRN    [START ON 10/24/2017] epoetin windy (EPOGEN;PROCRIT) injection 1,000 Units  1,000 Units IntraVENous DIALYSIS TUE, THU & SAT    VANCOMYCIN INFORMATION NOTE   Other Rx Dosing/Monitoring    fentaNYL (PF) 10 mcg/mL infusion  0-200 mcg/hr IntraVENous TITRATE    diphenhydrAMINE (BENADRYL) injection 12.5 mg  12.5 mg IntraVENous Q4H PRN    ondansetron [facility]) injection 4 mg  4 mg IntraVENous Q4H PRN    acetaminophen (TYLENOL) suppository 650 mg  650 mg Rectal Q4H PRN    heparin (porcine) injection 5,000 Units  5,000 Units SubCUTAneous Q12H    albuterol-ipratropium (DUO-NEB) 2.5 MG-0.5 MG/3 ML  3 mL Nebulization Q6H RT         Physical Exam:     Visit Vitals    /54    Pulse 67    Temp 98.1 °F (36.7 °C)    Resp 20    Ht 5' 5\" (1.651 m)    Wt 77.2 kg (170 lb 3.1 oz)    SpO2 100%    BMI 28.32 kg/m2     BP Readings from Last 3 Encounters:   10/23/17 114/54   09/22/17 103/52   04/17/17 140/70     Pulse Readings from Last 3 Encounters:   10/23/17 67   09/22/17 61   04/17/17 82     Wt Readings from Last 3 Encounters:   10/23/17 77.2 kg (170 lb 3.1 oz)   09/21/17 79.9 kg (176 lb 2.4 oz)   04/17/17 76.2 kg (168 lb)       General:  alert, cooperative, appears stated age, intubated, moves eyes, able to indicate yes/no  Lungs:  clear to auscultation bilaterally to anterior lung fields  Heart:  Irregular rhythm  Abdomen:  abdomen is soft without significant tenderness, masses, organomegaly or guarding  Extremities:  no edema, pt in soft restraints  Skin: Warm and dry.    Neuro: Alert, moves eyes  Psych: unable to assess     Data Review:     Recent Labs      10/23/17   0150  10/22/17   0551  10/21/17   1535   WBC  8.2  6.9  5.1   HGB  10.5*  11.0*  11.1*   HCT  32.3*  35.1  35.7   PLT  131*  164  169     Recent Labs      10/23/17   0150  10/22/17   0551  10/21/17   1535   NA  136  135*  133*   K  3.6  3.8  3.2*   CL  107  104  101   CO2  16*  25  27   GLU  76  82  73*   BUN  30*  22*  16   CREA  4.71*  3.99*  2.92*   CA  8.4*  8.3*  8.0*   MG   --    --   2.1   ALB  2.7*  3.2*  3.4   SGOT  16  7*  11*   ALT  9*  9*  10*   INR   --   1.1  1.1       Results for orders placed or performed during the hospital encounter of 10/21/17   EKG, 12 LEAD, INITIAL   Result Value Ref Range    Ventricular Rate 73 BPM    Atrial Rate 87 BPM    P-R Interval 64 ms    QRS Duration 140 ms    Q-T Interval 440 ms    QTC Calculation (Bezet) 484 ms    Calculated R Axis -122 degrees    Calculated T Axis -2 degrees    Diagnosis       Demand pacemaker, interpretation is based on intrinsic rhythm  Sinus rhythm with marked sinus arrhythmia with short IA with premature   ventricular complexes or fusion complexes with ventricular escape complexes  Abnormal ECG  When compared with ECG of 29-SEP-2015 10:34,  Significant changes have occurred  Confirmed by Amparo Staley ([id number]) on 10/22/2017 9:55:15 AM     Results for orders placed or performed in visit on 04/17/17   AMB POC EKG ROUTINE W/ 12 LEADS, INTER & REP    Impression    See progress note. Results for orders placed or performed in visit on 10/29/14   PACEMAKER CHECK    Impression    A-paced - 35%; V-sensed - 98%; V-paced - 2%; A-sensed - 65%; Lead  impedances and threshold WNL; some mode switching and runs of  PVCs; Steri-strips and bandage removed;  Incision is dry, no  redness, swelling, or drainage noted       Last Lipid:    Lab Results   Component Value Date/Time    Cholesterol, total 110 05/16/2011 09:50 AM    HDL Cholesterol 34 05/16/2011 09:50 AM    LDL, calculated 43.6 05/16/2011 09:50 AM    Triglyceride 162 05/16/2011 09:50 AM    CHOL/HDL Ratio 3.2 05/16/2011 09:50 AM       Signed By: Guido Sutherland PA-C     October 23, 2017

## 2017-10-23 NOTE — CONSULTS
CARDIOTHORACIC SURGERY CONSULTATION NOTE    10/23/2017  6:08 PM    I am seeing this patient for the first time in consultation at the request of Dr. Carmelina Shine. I have also reviewed her records and pertinent studies, and have obtained additional information on the patient's history from the patient's daughter and sister who were present during the evaluation. Eugene Baumann is a 75 y.o. female who was admitted two days ago with two weeks of increasing shortness of breath and flu-like symptoms. She had also developed a cough that was minimally productive. The day after admission she required intubation for respiratory failure. A bronchoscopy was performed and revealed tracheobronchial malacia. A chest CT scan demonstrated a large left pleural effusion and a thoracentesis produced 1200 cc of serosanguinous fluid, and subsequent to that a small bore chest drain was placed. She is presently intubated though awake and nodding appropriately. Her family states that she has not experienced chest pain, chest pressure/discomfort, dizziness, lower extremity edema, near-syncope, orthopnea, palpitations, paroxysmal nocturnal dyspnea, syncope, tachypnea, hemoptysis, fevers, night sweats, or a TB history. Cardiac risk factors include hypertension. There is no history of smoking/ tobacco exposure, family history, dyslipidemia, diabetes mellitus. Past Medical History:   Diagnosis Date    Cardiac echocardiogram 09/03/2014    Sm LV cavity. Hyperdynamic LV function. EF 75-80%. No WMA. Severe conc LVH. RVSP 25-30 mmHg. Mod-marked LAE. Mild MR.  Carotid duplex 04/26/2005    No significant occlusive disease bilaterally.     Chronic kidney disease     Hypertension     Murmur 2005    Second degree AV block 2014    Medtronic dual-chamber permanent pacemaker    Stroke [facility])        Past Surgical History:   Procedure Laterality Date    HX TUBAL LIGATION         The past surgical history is also notable for a cholecystectomy. She receives dialysis.     Present medications include   Current Facility-Administered Medications   Medication Dose Route Frequency Provider Last Rate Last Dose    chlorhexidine (PERIDEX) 0.12 % mouthwash 10 mL  10 mL Oral BID Thao Crain NP   10 mL at 10/23/17 1728    glucose chewable tablet 16 g  4 Tab Oral PRN Thao Crain NP        glucagon (GLUCAGEN) injection 1 mg  1 mg IntraMUSCular PRN Thao Crain NP        dextrose (D50W) injection syrg 12.5-25 g  25-50 mL IntraVENous PRN Thao Crain NP   25 g at 10/23/17 1218    midazolam (VERSED) injection 1-2 mg  1-2 mg IntraVENous Q4H PRN ELLIOT Hester [START ON 10/24/2017] Vancomycin Random Level  1 Each Other Brian Edwards MD        [facility] ON 10/24/2017] cefepime (MAXIPIME) 500 g in 0.9% sodium chloride 100 mL IVPB  500 g IntraVENous Q24H Roxanna Bella MD        famotidine (PF) (PEPCID) 20 mg in sodium chloride 0.9 % 10 mL injection  20 mg IntraVENous Q24H Katherine Lora MD        0.9% sodium chloride infusion  100 mL/hr IntraVENous DIALYSIS PRN Amy Lopez MD        albumin human 25% (BUMINATE) solution 12.5 g  12.5 g IntraVENous DIALYSIS PRN MD Santos Newman [START ON 10/24/2017] doxercalciferol (HECTOROL) 4 mcg/2 mL injection 6 mcg  6 mcg IntraVENous DIALYSIS KIESHA CHIN & MCKAY Lopez MD        alteplase (CATHFLO) 2 mg in sterile water (preservative free) 2 mL injection  2 mg InterCATHeter ONCE PRN Amy Lopez MD        heparin (porcine) 1,000 unit/mL injection 1,000 Units  1,000 Units InterCATHeter DIALYSIS PRN MD Santos Newman [START ON 10/24/2017] epoetin windy (EPOGEN;PROCRIT) injection 1,000 Units  1,000 Units IntraVENous DIALYSIS KIESHA CHIN & MCKAY Lopez MD        amiodarone (NEXTERONE) 360 mg in dextrose 200 mL (1.8 mg/mL) infusion  0.5 mg/min IntraVENous TITRATE Ning Stiles MD  VANCOMYCIN INFORMATION NOTE   Other Rx Dosing/Monitoring Butch Everett MD        fentaNYL (PF) 10 mcg/mL infusion  0-200 mcg/hr IntraVENous TITRATE Ella King NP 5 mL/hr at 10/23/17 0030 50 mcg/hr at 10/23/17 0030    diphenhydrAMINE (BENADRYL) injection 12.5 mg  12.5 mg IntraVENous Q4H PRN Butch Everett MD        ondansetron [facility]) injection 4 mg  4 mg IntraVENous Q4H PRN Butch Everett MD        acetaminophen (TYLENOL) suppository 650 mg  650 mg Rectal Q4H PRN Butch Everett MD        heparin (porcine) injection 5,000 Units  5,000 Units SubCUTAneous Q12H Butch Everett MD   5,000 Units at 10/23/17 1218    albuterol-ipratropium (DUO-NEB) 2.5 MG-0.5 MG/3 ML  3 mL Nebulization Q6H RT Butch Everett MD   3 mL at 10/23/17 1448       Drug allergies: No Known Allergies    Family History: There is not a history of heart disease in the family. Social History: There is no history of alcohol abuse. She lives with her grandson and is a retired . She does not get out of bed except to be transported to dialysis on a stretcher due to a stroke that made it impossible for her to walk.      REVIEW OF SYSTEMS:   Constitutional: positive for weight loss recently, but no fevers or chills  Integumentary: no recent rashes  Eyes: no diplopia, transient visual loss  ENMT: no hearing loss, sinus congestion, sore throat  Respiratory: as noted above in history  Cardiovascular: as noted above in history   Gastrointestinal: no abdominal pain, diarrhea  Genitourinary: no dysuria  Musculoskeletal: no chronic back pain, joint pain, recent fractures, leg cramping  Hematologic / Lymphatic: no easy bruisability, clots in legs  Neurological: no headaches  Psychiatric: no anxiety    PHYSICAL EXAMINATION:  Vital signs:   BP Readings from Last 3 Encounters:   10/23/17 114/54   17 103/52   17 140/70     Temp (24hrs), Av.2 °F (36.8 °C), Min:97.6 °F (36.4 °C), Max:98.8 °F (37.1 °C)    Wt Readings from Last 3 Encounters:   10/23/17 77.2 kg (170 lb 3.1 oz)   09/21/17 79.9 kg (176 lb 2.4 oz)   04/17/17 76.2 kg (168 lb)     Ht Readings from Last 3 Encounters:   10/22/17 5' 5\" (1.651 m)   09/21/17 5' 6\" (1.676 m)   04/17/17 5' 7\" (1.702 m)     General appearance:  She appeared well-nourished and of large build. Integument: The skin was warm and dry and had good turgor. There were not lower extremity venous stasis changes. Head and Neck: The head was normocephalic and was atraumatic. Thyromegaly was absent, and cervical and supraclavicular lymphadenopathy were absent. EENMT: Conjunctivae were injected. The sclerae were anicteric. Oral mucosa were not moist.   Back: She was kyphotic. Lungs: Respirations were not labored, and the lungs were clear to auscultation bilaterally, without rales, without rhonchi, and without wheezes, though diminished on the left. There was a visible air leak through the chest tube. It had drained minimal fluid thorough the day today. Heart: The rate and rhythm were irregular, without an S3, without JVD, and without a murmur. The PMI was not displaced laterally. Abdomen: The abdomen was globoid and was soft and was not tender, with good bowel sounds, and hepatomegaly was absent, and splenomegaly was absent. Pulsatile masses and bruits in the abdomen were absent. Vascular: Carotid pulses were 2+ bilaterally without bruits, and radial pulses were absent bilaterally. Pedal pulses were present on the left only. Varicose veins were absent in both legs. Extremities: Clubbing was absent, and pedal edema was absent. Neurologic: She was alert and oriented, and was intubated. Psychiatric: She seemed calm.     LABORATORIES:   Lab Results   Component Value Date/Time    WBC 8.2 10/23/2017 01:50 AM    HCT 32.3 (L) 10/23/2017 01:50 AM     (L) 10/23/2017 01:50 AM      Lab Results   Component Value Date/Time     10/23/2017 01:50 AM    K 3.6 10/23/2017 01:50 AM     10/23/2017 01:50 AM    CO2 16 (L) 10/23/2017 01:50 AM    GLU 76 10/23/2017 01:50 AM    BUN 30 (H) 10/23/2017 01:50 AM    CREA 4.71 (H) 10/23/2017 01:50 AM    CREA 3.99 (H) 10/22/2017 05:51 AM    CREA 2.92 (H) 10/21/2017 03:35 PM     Albumin 2.7, total bilirubin 0.5, INR 1.1  Troponin: .11    BNP: 33183    Pleural fluid protein: 4.7; LDH 69    The chest x-ray image, which I have personally reviewed, demonstrates a large air space on the left side following the thoracentesis. The EKG print-out, which I have personally reviewed, shows a paced rhythm. I have also personally reviewed the chest CT scan images, which revealed cardiomegaly and a large left pleural effusion with complete collapse of the left lung and a small-to-moderate right pleural effusion. Impression:  Diagnoses:  1. Large left pleural effusion, exudative  2. Incomplete expansion of the left lung  3. Essential hypertension    Unclear etiology of large left pleural effusion; will await cytology results. Lung not expanded fully, which we would like to see in order to repeat the CT scan to help look for a source. It may be that the lung is not fully expanded from being collapsed for some time, or it could be trapped by a peel. If the former, this will hopefully resolve with the positive pressure ventilation from the vent, particularly with a persistent air leak now present. However, if it does not re-expand, need to consider VATS decortication soon. Recommendations:   As above. Keep tube to suction and repeat CXRs. If lung expands, obtain CT scan to look for intraparenchymal lesions. Thank you for involving me in her care.     Dale Servin MD

## 2017-10-23 NOTE — ROUTINE PROCESS
Bedside, Verbal and Written shift change report given to [facility] RN (oncoming nurse) by Krunal Huizar (offgoing nurse). Report included the following information SBAR, Kardex, ED Summary, OR Summary, Procedure Summary, Intake/Output, MAR, Accordion, Recent Results and Med Rec Status.

## 2017-10-23 NOTE — CDMP QUERY
\"Elevated troponin likely ESRD vs NSTEMI due to demand ischemia\" documented on H&P. Please clarify if this patient is being treated/managed for:    =>NSTEMI due to demand ischemia  or  =>NSTEMI ruled out  =>Other Explanation of clinical findings  =>Unable to Determine (no explanation of clinical findings)    The medical record reflects the following:  Risk:  --admitted with acute hypercapnic respiratory failure, pleural effusion    Clinical Indicators:  --10/21/2017 15:35: CK: 41, CK-MB Index: 8.3 (H), CK - MB: 3.4, Troponin-I, Qt.: 0.11 (H)  --10/22/2017 05:51: CK: 28, CK-MB Index: 10.4 (H), CK - MB: 2.9, Troponin-I, Qt.: 0.10 (H)    Treatment:   --receiving Heparin SQ    Please clarify and document your clinical opinion in the progress notes and discharge summary including the definitive and/or presumptive diagnosis, (suspected or probable), related to the above clinical findings. Please include clinical findings supporting your diagnosis. If you DECLINE this query or would like to communicate with [facility], please utilize the \"[employer] message box\" at the TOP of the Progress Note on the right.       Thank you,  Ann Holguin [facility], [address] [place] [address]

## 2017-10-23 NOTE — ROUTINE PROCESS
Assumed care. THORACENTHESIS procedure just completed and specimen sent to lab for test as ordered. Patient more awake but does not follow any command. Fentanyl gtt stareted at 25mcg/hour and propofol gtt at 35mcg/kg/min. PORT CXR done by Radiology tech. CONSENT FOR cHEST tUBE iNSERTION Obtained from daughter. Bilat soft wrist restraints reinforced. CArdiiac monitor shows AV PACED RHYTHM WITH INTERMITTENT V-PACED RHYTHM. Bilat prevalon boots applied. Mepilex drsg to sacral area applied.

## 2017-10-23 NOTE — PROGRESS NOTES
Problem: Falls - Risk of  Goal: *Absence of Falls  Document Richard Fall Risk and appropriate interventions in the flowsheet. Outcome: Progressing Towards Goal  Fall Risk Interventions:            Medication Interventions: Evaluate medications/consider consulting pharmacy    Elimination Interventions:  Toilet paper/wipes in reach

## 2017-10-23 NOTE — PROGRESS NOTES
[facility]  Progress Note    Patient: Lucia Andersen Age: 75 y.o. : 1950 MR#: [id number] SSN: [id number]  Date: 10/23/2017     Subjective: pt is awake, follows commands. But not able to perform ROS due to intubation. Daughter at bedside. Assessment:   Acute hypercarbic respiratory failure with respiratory acidosis:  Etiology unclear at this time. Now intubated. ABG improved. Large left pleural effusion:  Thoracentesis completed 10/22/17; A total of 1300 cc of serosanginous fluid, was aspirated and sent for analysis. ESRD on HD  Anemia of chronic disease:  Stable  Mild hyponatremia  Ventricular Tachycardia  Pneumothorax  Secondary HPTH       Plan:   Continue to monitor in ICU, intubated, ventilated. Thoracentesis results: RBC: 8738, nucleated cells: 213. Continue Vancomycin and cefepime  HD as per nephrology  Follow CBC, CMP  CXR in am  F/u blood cx: neg so far  NGT in place, continue tube feeding  VT noted on Tele. Has post dual-chamber permanent pacemaker In . AV paced. S/p chest tube for pneumothorax. CXr on 10/23/17 shows: Persistent moderate left-sided pneumothorax despite a pigtail drainage catheter placement. Suspect trapped lung. CT surgery following. Cont epo per Nephrology  Cardiology consult for VT.    Additional Notes:      Case discussed with:  [x]Patient  [x]Family  []Nursing  []Case Management  DVT Prophylaxis:  []Lovenox  [x]Hep SQ  []SCDs  []Coumadin   []On Heparin gtt    Objective:   VS:   Visit Vitals    /54    Pulse 67    Temp 98.8 °F (37.1 °C)    Resp 20    Ht 5' 5\" (1.651 m)    Wt 77.2 kg (170 lb 3.1 oz)    SpO2 100%    BMI 28.32 kg/m2      Tmax/24hrs: Temp (24hrs), Av.8 °F (36.6 °C), Min:96 °F (35.6 °C), Max:98.8 °F (37.1 °C)    Intake/Output Summary (Last 24 hours) at 10/23/17 1621  Last data filed at 10/23/17 0800   Gross per 24 hour   Intake           672.88 ml   Output             1600 ml   Net -927.12 ml     General: awake, intubated, follows commands, appears in no distress  HEENT: non icteric, ET/NGT in place  Neck: No JVD  Cardiovascular: RRR  C/L: bilateral vent breath sounds, no rales/wheezing. Abdomen: soft, + BS  Ext: Right femoral HD cath, no LE edema   Additional:      Labs:    Recent Results (from the past 24 hour(s))   POC G3    Collection Time: 10/22/17  6:26 PM   Result Value Ref Range    Device: VENT      FIO2 (POC) 100 %    pH (POC) 7.228 (LL) 7.35 - 7.45      pCO2 (POC) 45.3 (H) 35.0 - 45.0 MMHG    pO2 (POC) 316 (H) 80 - 100 MMHG    HCO3 (POC) 18.9 (L) 22 - 26 MMOL/L    sO2 (POC) 100 (H) 92 - 97 %    Base deficit (POC) 9 mmol/L    Mode ASSIST CONTROL      Tidal volume 350 ml    Set Rate 26 bpm    PEEP/CPAP (POC) 10 cmH2O    Allens test (POC) YES      Inspiratory Time 0.9 sec    Total resp. rate 26      Site RIGHT RADIAL      Patient temp. 98.6      Specimen type (POC) ARTERIAL      Performed by Larri Fly     Volume control plus YES     LDH, BODY FLUID    Collection Time: 10/22/17  7:00 PM   Result Value Ref Range    Fluid Type: PLEURAL FLUID      LD, body fld. 69 U/L   CULTURE, BODY FLUID W GRAM STAIN    Collection Time: 10/22/17  7:00 PM   Result Value Ref Range    Special Requests: NO SPECIAL REQUESTS      GRAM STAIN NO ORGANISMS SEEN      GRAM STAIN NO WBC'S SEEN      Culture result: NO GROWTH AFTER 14 HOURS     PROTEIN TOTAL, FLUID    Collection Time: 10/22/17  7:00 PM   Result Value Ref Range    Fluid Type: PLEURAL FLUID      Protein total, body fld. 4.7 g/dL   GLUCOSE, FLUID    Collection Time: 10/22/17  7:00 PM   Result Value Ref Range    Fluid Type: PLEURAL FLUID      Glucose, body fld.  89 MG/DL   CULTURE, RESPIRATORY/SPUTUM/BRONCH W GRAM STAIN    Collection Time: 10/22/17  7:00 PM   Result Value Ref Range    Special Requests: NO SPECIAL REQUESTS      GRAM STAIN MANY WBC'S      GRAM STAIN NO ORGANISMS SEEN      Culture result: FEW NORMAL RESPIRATORY CARLOS ALBERTO     CELL COUNT, BODY FLUID    Collection Time: 10/22/17  7:00 PM   Result Value Ref Range    BODY FLUID TYPE BRONCH LAVAGE LT LOWER LOBE      FLUID COLOR YELLOW      FLUID APPEARANCE CLOUDY      FLUID RBC CT. 8738 (A) NRRE /cu mm    FLUID NUCLEATED CELLS 213 (A) NRRE /cu mm   CULTURE, FUNGUS    Collection Time: 10/22/17  7:00 PM   Result Value Ref Range    Special Requests: NO SPECIAL REQUESTS      FUNGUS SMEAR NO FUNGAL ELEMENTS SEEN      Culture result: PENDING    BODY FLUID DIFF    Collection Time: 10/22/17  7:00 PM   Result Value Ref Range    FLD NEUTROPHILS 9 %    FLD BANDS 0 %    FLD LYMPHS 73 %    FLD MONOCYTES 0 %    FLD EOSINS 0 %    MACROPHAGE 18 %   POC G3    Collection Time: 10/22/17  9:00 PM   Result Value Ref Range    Device: VENT      FIO2 (POC) 60 %    pH (POC) 7.309 (L) 7.35 - 7.45      pCO2 (POC) 35.7 35.0 - 45.0 MMHG    pO2 (POC) 172 (H) 80 - 100 MMHG    HCO3 (POC) 17.9 (L) 22 - 26 MMOL/L    sO2 (POC) 99 (H) 92 - 97 %    Base deficit (POC) 8 mmol/L    Mode ASSIST CONTROL      Tidal volume 350 ml    Set Rate 26 bpm    PEEP/CPAP (POC) 10 cmH2O    Allens test (POC) YES      Inspiratory Time 0.9 sec    Total resp. rate 26      Site RIGHT RADIAL      Patient temp. 98.6      Specimen type (POC) ARTERIAL      Performed by Fifi Pollard     Volume control plus YES     GLUCOSE, POC    Collection Time: 10/23/17 12:16 AM   Result Value Ref Range    Glucose (POC) 81 70 - 110 mg/dL   LD    Collection Time: 10/23/17  1:50 AM   Result Value Ref Range     (H) 81 - 234 U/L   HEPATIC FUNCTION PANEL    Collection Time: 10/23/17  1:50 AM   Result Value Ref Range    Protein, total 6.6 6.4 - 8.2 g/dL    Albumin 2.7 (L) 3.4 - 5.0 g/dL    Globulin 3.9 2.0 - 4.0 g/dL    A-G Ratio 0.7 (L) 0.8 - 1.7      Bilirubin, total 0.5 0.2 - 1.0 MG/DL    Bilirubin, direct 0.1 0.0 - 0.2 MG/DL    Alk.  phosphatase 178 (H) 45 - 117 U/L    AST (SGOT) 16 15 - 37 U/L    ALT (SGPT) 9 (L) 13 - 56 U/L   VANCOMYCIN, RANDOM    Collection Time: 10/23/17  1:50 AM   Result Value Ref Range    Vancomycin, random 21.3 5.0 - 40.0 UG/ML   CBC WITH AUTOMATED DIFF    Collection Time: 10/23/17  1:50 AM   Result Value Ref Range    WBC 8.2 4.6 - 13.2 K/uL    RBC 3.11 (L) 4.20 - 5.30 M/uL    HGB 10.5 (L) 12.0 - 16.0 g/dL    HCT 32.3 (L) 35.0 - 45.0 %    .9 (H) 74.0 - 97.0 FL    MCH 33.8 24.0 - 34.0 PG    MCHC 32.5 31.0 - 37.0 g/dL    RDW 13.5 11.6 - 14.5 %    PLATELET 151 (L) 759 - 420 K/uL    MPV 10.4 9.2 - 11.8 FL    NEUTROPHILS 86 (H) 40 - 73 %    LYMPHOCYTES 6 (L) 21 - 52 %    MONOCYTES 7 3 - 10 %    EOSINOPHILS 1 0 - 5 %    BASOPHILS 0 0 - 2 %    ABS. NEUTROPHILS 7.0 1.8 - 8.0 K/UL    ABS. LYMPHOCYTES 0.5 (L) 0.9 - 3.6 K/UL    ABS. MONOCYTES 0.6 0.05 - 1.2 K/UL    ABS. EOSINOPHILS 0.0 0.0 - 0.4 K/UL    ABS. BASOPHILS 0.0 0.0 - 0.1 K/UL    DF AUTOMATED     METABOLIC PANEL, BASIC    Collection Time: 10/23/17  1:50 AM   Result Value Ref Range    Sodium 136 136 - 145 mmol/L    Potassium 3.6 3.5 - 5.5 mmol/L    Chloride 107 100 - 108 mmol/L    CO2 16 (L) 21 - 32 mmol/L    Anion gap 13 3.0 - 18 mmol/L    Glucose 76 74 - 99 mg/dL    BUN 30 (H) 7.0 - 18 MG/DL    Creatinine 4.71 (H) 0.6 - 1.3 MG/DL    BUN/Creatinine ratio 6 (L) 12 - 20      GFR est AA 11 (L) >60 ml/min/1.73m2    GFR est non-AA 9 (L) >60 ml/min/1.73m2    Calcium 8.4 (L) 8.5 - 10.1 MG/DL   POC G3    Collection Time: 10/23/17  4:09 AM   Result Value Ref Range    Device: VENT      FIO2 (POC) 50 %    pH (POC) 7.372 7.35 - 7.45      pCO2 (POC) 30.2 (L) 35.0 - 45.0 MMHG    pO2 (POC) 205 (H) 80 - 100 MMHG    HCO3 (POC) 17.6 (L) 22 - 26 MMOL/L    sO2 (POC) 100 (H) 92 - 97 %    Base deficit (POC) 8 mmol/L    Mode ASSIST CONTROL      Tidal volume 350 ml    Set Rate 22 bpm    PEEP/CPAP (POC) 10 cmH2O    Allens test (POC) YES      Inspiratory Time 0.9 sec    Total resp. rate 22      Site RIGHT RADIAL      Patient temp.  98.6      Specimen type (POC) ARTERIAL      Performed by Ana Luisa Butler     Volume control plus YES     GLUCOSE, POC    Collection Time: 10/23/17  5:47 AM   Result Value Ref Range    Glucose (POC) 55 (L) 70 - 110 mg/dL   GLUCOSE, POC    Collection Time: 10/23/17  5:49 AM   Result Value Ref Range    Glucose (POC) 84 70 - 110 mg/dL   GLUCOSE, POC    Collection Time: 10/23/17 12:11 PM   Result Value Ref Range    Glucose (POC) 61 (L) 70 - 110 mg/dL   GLUCOSE, POC    Collection Time: 10/23/17 12:53 PM   Result Value Ref Range    Glucose (POC) 82 70 - 110 mg/dL       Signed By: Sandra Zamarripa PA-C     October 23, 2017 4:21 PM

## 2017-10-23 NOTE — ROUTINE PROCESS
Chest tube insertion procedure started by DR. Sydni Joy assisted by RACQUEL QUEEN. #14 chest tube inplace and portable CXR DONE. Drsg done and CT CONNECTED TO [facility] DRY SCTION and with mod serosanguinous drainage. NO airlieaks noted so far. Accucheck bs 64 and repeated with 81 result.

## 2017-10-23 NOTE — ROUTINE PROCESS
Bedside and Verbal shift change report given to Nurse Jesus Kapoor RN (oncoming nurse) by Yari Rangel RN   (offgoing nurse). Report included the following information SBAR, Kardex, ED Summary, Procedure Summary, Intake/Output, MAR and Recent Results.

## 2017-10-23 NOTE — PROCEDURES
Chest tube insertion    Procedure Details: The risks,benefits and alternatives  were explained and consent was obtained for the procedure. The chest tube is being inserted to resolve pneumothorax. Procedural sedation was provided including fentanyl versed. The area just above the left lateral 4th/5th rib space rib over the mid-axillary line was anesthetized using local infiltration with 1% lidocaine. A small  incision was made using a 1-4= Minor stroke blade and the catheter with introducer needle was inserted. The introducer needle was removed and a guidewire was inserted. Using a modified seldinger technique, chest tubes were inserted until a 14 chest tube remained. This was secured in place using a  2 silk purse string suture. This was reinforced with a dressing. The chest tube was attached to the pleurovac. The patient tolerated the procedure well and a post-chest tube insertion CXR was obtained.       Signed By: Kelsey Telles MD     October 22, 2017

## 2017-10-23 NOTE — PROGRESS NOTES
[facility]  ICU Progress Note      Name: Shania Reyes   : 1950   MRN: [id number]   Date: 10/23/2017 11:10 AM     [x]I have reviewed the flowsheet and previous days notes. Events overnight reviewed and discussed with nursing staff. Vital signs and records reviewed. Subjective: Intubated yesterday evening, broncoscopy performed which showed significant tracheobronchomalacia, but normal endobroncial anatomy with no lesions. Thoracentesis was performed for large left pleural effusion w/ return of 1200 cc serosanguinous fluid and chest tube was placed. No complaints this AM.     [x]The patient is unable to give any meaningful history or review of systems because the patient is:  [x]Intubated []Sedated   []Unresponsive      [x]The patient is critically ill on      [x]Mechanical ventilation []Pressors   []BiPAP []                 ROS:Review of systems not obtained due to patient factors.     Medication Review:  · Pressors - none  · Sedation - fentanyl 50 mcg/hr  · Antibiotics - vancomycin  · Pain - none  · GI/ DVT - heparin 5,000 units BID, pepcid  · Others (other gtts)    Safety Bundles: VAP Bundle/ CAUTI/ Severe Sepsis Protocol/ Electrolyte Replacement Protocol    Vital Signs:    Visit Vitals    /64    Pulse 62    Temp 98.7 °F (37.1 °C)    Resp 20    Ht 5' 5\" (1.651 m)    Wt 77.2 kg (170 lb 3.1 oz)    SpO2 100%    BMI 28.32 kg/m2       O2 Device: Ventilator, Endotracheal tube, Heated, Humidifier   O2 Flow Rate (L/min): 3 l/min   Temp (24hrs), Av.5 °F (36.4 °C), Min:96 °F (35.6 °C), Max:98.7 °F (37.1 °C)       Intake/Output:   Last shift:         Last 3 shifts: 10/21 1901 - 10/23 0700  In: 722.9 [I.V.:722.9]  Out: 1600     Intake/Output Summary (Last 24 hours) at 10/23/17 1110  Last data filed at 10/23/17 0800   Gross per 24 hour   Intake           722.88 ml   Output             1600 ml   Net          -877.12 ml   thoracentesis 1200 ml  chest tube 400 ml w/ air leak    Ventilator Settings:  Ventilator  Mode: Assist control, VC+  Respiratory Rate  Back-Up Rate: 22  Insp Time (sec): 0.9 sec  I:E Ratio: 1;2  Ventilator Volumes  Vt Set (ml): 350 ml  Vt Exhaled (Machine Breath) (ml): 430 ml  Vt Spont (ml): 1114 ml  Ve Observed (l/min): 9.46 l/min  Ventilator Pressures  PIP Observed (cm H2O): 28 cm H2O  Plateau Pressure (cm H2O): 26 cm H2O  MAP (cm H2O): 16  PEEP/VENT (cm H2O): 10 cm H20  Auto PEEP Observed (cm H2O): 0 cm H2O    Physical Exam:    General: Intubated, Awake and alert   HEENT:  Anicteric sclerae; pink palpebral conjunctivae; mucosa moist  Resp:  Symmetrical chest expansion, EET in place on vent, equal breath sounds BL, no wheezes or rales  CV:  S1, S2 present; regular rate and rhythm  GI:  Abdomen soft, non-tender; (+) active bowel sounds  Extremities:  no edema/ cyanosis/ clubbing noted   Skin:  Warm; no rashes/ lesions noted, CT L chest wall  Neurologic:  Follows directions, no focal deficits  Devices:  NGT, ETT, chest tube, R femoral HD cath      DATA:     Current Facility-Administered Medications   Medication Dose Route Frequency    chlorhexidine (PERIDEX) 0.12 % mouthwash 10 mL  10 mL Oral BID    glucose chewable tablet 16 g  4 Tab Oral PRN    glucagon (GLUCAGEN) injection 1 mg  1 mg IntraMUSCular PRN    dextrose (D50W) injection syrg 12.5-25 g  25-50 mL IntraVENous PRN    midazolam (VERSED) injection 1-2 mg  1-2 mg IntraVENous Q4H PRN    vancomycin (VANCOCIN) 500 mg in 0.9% sodium chloride (MBP/ADV) 100 mL MBP  500 mg IntraVENous ONCE    [START ON 10/24/2017] Vancomycin Random Level  1 Each Other ONCE    [START ON 10/24/2017] cefepime (MAXIPIME) 500 g in 0.9% sodium chloride 100 mL IVPB  500 g IntraVENous Q24H    famotidine (PF) (PEPCID) 20 mg in sodium chloride 0.9 % 10 mL injection  20 mg IntraVENous Q24H    VANCOMYCIN INFORMATION NOTE   Other Rx Dosing/Monitoring    fentaNYL (PF) 10 mcg/mL infusion  0-200 mcg/hr IntraVENous TITRATE    diphenhydrAMINE (BENADRYL) injection 12.5 mg  12.5 mg IntraVENous Q4H PRN    ondansetron (ZOFRAN) injection 4 mg  4 mg IntraVENous Q4H PRN    acetaminophen (TYLENOL) suppository 650 mg  650 mg Rectal Q4H PRN    heparin (porcine) injection 5,000 Units  5,000 Units SubCUTAneous Q12H    albuterol-ipratropium (DUO-NEB) 2.5 MG-0.5 MG/3 ML  3 mL Nebulization Q6H RT         Labs: Results:       Chemistry Recent Labs      10/23/17   0150  10/22/17   0551  10/21/17   1535   GLU  76  82  73*   NA  136  135*  133*   K  3.6  3.8  3.2*   CL  107  104  101   CO2  16*  25  27   BUN  30*  22*  16   CREA  4.71*  3.99*  2.92*   CA  8.4*  8.3*  8.0*   AGAP  13  6  5   BUCR  6*  6*  5*   AP  178*  206*  214*   TP  6.6  7.4  8.0   ALB  2.7*  3.2*  3.4   GLOB  3.9  4.2*  4.6*   AGRAT  0.7*  0.8  0.7*      CBC w/Diff Recent Labs      10/23/17   0150  10/22/17   0551  10/21/17   1535   WBC  8.2  6.9  5.1   RBC  3.11*  3.24*  3.34*   HGB  10.5*  11.0*  11.1*   HCT  32.3*  35.1  35.7   PLT  131*  164  169   GRANS  86*  77*  76*   LYMPH  6*  16*  15*   EOS  1  1  2      Coagulation Recent Labs      10/22/17   0551  10/21/17   1535   PTP  13.7  13.9   INR  1.1  1.1   APTT  36.4  33.0       Liver Enzymes Recent Labs      10/23/17   0150   TP  6.6   ALB  2.7*   AP  178*   SGOT  16      ABG Lab Results   Component Value Date/Time    PHI 7.372 10/23/2017 04:09 AM    PCO2I 30.2 (L) 10/23/2017 04:09 AM    PO2I 205 (H) 10/23/2017 04:09 AM    HCO3I 17.6 (L) 10/23/2017 04:09 AM    FIO2I 50 10/23/2017 04:09 AM      Microbiology Recent Labs      10/22/17   1900  10/21/17   1700  10/21/17   1610   CULT  PENDING  PENDING  PENDING  NO GROWTH 2 DAYS  NO GROWTH 2 DAYS          Telemetry: []Sinus []A-flutter [x]Paced    []A-fib []Multiple PVCs                    Imaging:  [x]I have personally reviewed the patients radiographs  [x]Radiographs reviewed with radiologist   []No change from prior, tubes and lines in adequate position  []Improved []Worsening        IMPRESSION:   · 76 yo female with hx of ESRD on HD w/ worsening dyspnea x 1 month with progressed to hypercarbic respiratory failure. Large left pleural effusion s/p thoracentesis and chest tube placement, now with persistent L sided pneumothorax w/ suspected trapped lung. PLAN:   · Resp -  Continue vent support w/ pulm hygiene. Decrease PEEP to 5. CT surgery consult for persistent pneumothorax despite chest tube. Follow up pleural fluid labs. Daily ABG  · ID - continue empiric vancomycin. Follow cultures.   · CVS - hemodynamically stable, paced rhythm- continue to monitor  · Heme/onc - continue to trend H/H    · Renal - HD Tue, Thur, Sat  · Endocrine - glucose goal 140-180  · Neuro/ Pain/ Sedation - continue fentanyl   · GI - start tube feeds via NGT w/ Nepro @ 20mL/hr  · Prophylaxis - DVT, GI  · Discussed in interdisciplinary rounds          The patient is: [] acutely ill Risk of deterioration: [] moderate    [x] critically ill  [x] high     []See my orders for details    My assessment/plan was discussed with:  [x]nursing []PT/OT    [x]respiratory therapy [x]Dr. Dian Manning   []family []     []Total critical care time exclusive of procedures       minutes    Yordy Conroy MD

## 2017-10-24 NOTE — PROGRESS NOTES
[facility]  ICU Progress Note      Name: Tejal Peck   : 1950   MRN: [id number]   Date: 10/24/2017 1:11 PM     [x]I have reviewed the flowsheet and previous days notes. Events overnight reviewed and discussed with nursing staff. Vital signs and records reviewed. Subjective:   Evaluated by cardiolgy and CT surgery yesterday. Started on Amiodarone by cardio for intermittent non-sustained V-tach w/ pacing and ventricular bigeminy on tele. No new events overnight. [x]The patient is unable to give any meaningful history or review of systems because the patient is:  [x]Intubated []Sedated   []Unresponsive      [x]The patient is critically ill on      [x]Mechanical ventilation []Pressors   []BiPAP []                 ROS:Review of systems not obtained due to patient factors.     Medication Review:  · Pressors - none  · Sedation - versed prn  · Antibiotics - cefepime, vanc  · Pain - fentanyl gtt  · GI/ DVT - pepcid, heparin  · Others (other gtts)    Safety Bundles: VAP Bundle/ CAUTI/ Severe Sepsis Protocol/ Electrolyte Replacement Protocol    Vital Signs:    Visit Vitals    /49    Pulse 70    Temp 97.5 °F (36.4 °C)    Resp 20    Ht 5' 5\" (1.651 m)    Wt 82 kg (180 lb 12.4 oz)    SpO2 100%    BMI 30.08 kg/m2       O2 Device: Endotracheal tube, Ventilator   O2 Flow Rate (L/min): 3 l/min   Temp (24hrs), Av.9 °F (36.6 °C), Min:97.5 °F (36.4 °C), Max:98.3 °F (36.8 °C)       Intake/Output:   Last shift:         Last 3 shifts: 10/22 1901 - 10/24 0700  In: 1568 [I.V.:958]  Out: 1820     Intake/Output Summary (Last 24 hours) at 10/24/17 1311  Last data filed at 10/24/17 0601   Gross per 24 hour   Intake           755.13 ml   Output              200 ml   Net           555.13 ml     Chest tube= 300 ml    Ventilator Settings:  Ventilator  Mode: Assist control, VC+  Respiratory Rate  Back-Up Rate: 20  Insp Time (sec): 0.9 sec  I:E Ratio: 1:1.8  Ventilator Volumes  Vt Set (ml): 350 ml  Vt Exhaled (Machine Breath) (ml): 464 ml  Vt Spont (ml): 1114 ml  Ve Observed (l/min): 9.25 l/min  Ventilator Pressures  PIP Observed (cm H2O): 25 cm H2O  Plateau Pressure (cm H2O): 21 cm H2O  MAP (cm H2O): 11  PEEP/VENT (cm H2O): 5 cm H20  Auto PEEP Observed (cm H2O): 0 cm H2O    Physical Exam:    General: Intubated, awake and alert   HEENT:  Anicteric sclerae; pink palpebral conjunctivae; mucosa moist  Resp:  Symmetrical chest expansion, no accessory muscle use; intubated with coarse breath sounds BL  CV:  S1, S2 present; regular rate and rhythm  GI:  Abdomen soft, non-tender; (+) active bowel sounds  Extremities:  +2 pulses on all extremities; no edema/ cyanosis/ clubbing noted  Skin:  Warm; no rashes/ lesions noted  Neurologic:  Non-focal, responds to simple commands  Devices:  HD catheter R groin, OG tube, ETT, L chest tube      DATA:     Current Facility-Administered Medications   Medication Dose Route Frequency    vancomycin (VANCOCIN) 500 mg in 0.9% sodium chloride (MBP/ADV) 100 mL MBP  500 mg IntraVENous ONCE    [START ON 10/26/2017] Vancomycin random level  1 Each Other Rx Dosing/Monitoring    chlorhexidine (PERIDEX) 0.12 % mouthwash 10 mL  10 mL Oral BID    glucose chewable tablet 16 g  4 Tab Oral PRN    glucagon (GLUCAGEN) injection 1 mg  1 mg IntraMUSCular PRN    dextrose (D50W) injection syrg 12.5-25 g  25-50 mL IntraVENous PRN    midazolam (VERSED) injection 1-2 mg  1-2 mg IntraVENous Q4H PRN    cefepime (MAXIPIME) 500 g in 0.9% sodium chloride 100 mL IVPB  500 g IntraVENous Q24H    famotidine (PF) (PEPCID) 20 mg in sodium chloride 0.9 % 10 mL injection  20 mg IntraVENous Q24H    0.9% sodium chloride infusion  100 mL/hr IntraVENous DIALYSIS PRN    albumin human 25% (BUMINATE) solution 12.5 g  12.5 g IntraVENous DIALYSIS PRN    doxercalciferol (HECTOROL) 4 mcg/2 mL injection 6 mcg  6 mcg IntraVENous DIALYSIS TUE, THU & SAT    alteplase (CATHFLO) 2 mg in sterile water (preservative free) 2 mL injection  2 mg InterCATHeter ONCE PRN    heparin (porcine) 1,000 unit/mL injection 1,000 Units  1,000 Units InterCATHeter DIALYSIS PRN    epoetin windy (EPOGEN;PROCRIT) injection 1,000 Units  1,000 Units IntraVENous DIALYSIS TUE, THU & SAT    amiodarone (NEXTERONE) 360 mg in dextrose 200 mL (1.8 mg/mL) infusion  0.5 mg/min IntraVENous TITRATE    VANCOMYCIN INFORMATION NOTE   Other Rx Dosing/Monitoring    fentaNYL (PF) 10 mcg/mL infusion  0-200 mcg/hr IntraVENous TITRATE    diphenhydrAMINE (BENADRYL) injection 12.5 mg  12.5 mg IntraVENous Q4H PRN    ondansetron (ZOFRAN) injection 4 mg  4 mg IntraVENous Q4H PRN    acetaminophen (TYLENOL) suppository 650 mg  650 mg Rectal Q4H PRN    heparin (porcine) injection 5,000 Units  5,000 Units SubCUTAneous Q12H    albuterol-ipratropium (DUO-NEB) 2.5 MG-0.5 MG/3 ML  3 mL Nebulization Q6H RT         Labs: Results:       Chemistry Recent Labs      10/24/17   0340  10/23/17   0150  10/22/17   0551  10/21/17   1535   GLU  117*  76  82  73*   NA  137  136  135*  133*   K  2.7*  3.6  3.8  3.2*   CL  107  107  104  101   CO2  18*  16*  25  27   BUN  43*  30*  22*  16   CREA  6.06*  4.71*  3.99*  2.92*   CA  8.2*  8.4*  8.3*  8.0*   AGAP  12  13  6  5   BUCR  7*  6*  6*  5*   AP   --   178*  206*  214*   TP   --   6.6  7.4  8.0   ALB   --   2.7*  3.2*  3.4   GLOB   --   3.9  4.2*  4.6*   AGRAT   --   0.7*  0.8  0.7*      CBC w/Diff Recent Labs      10/24/17   0340  10/23/17   0150  10/22/17   0551   WBC  9.1  8.2  6.9   RBC  2.82*  3.11*  3.24*   HGB  9.3*  10.5*  11.0*   HCT  28.2*  32.3*  35.1   PLT  151  131*  164   GRANS  85*  86*  77*   LYMPH  6*  6*  16*   EOS  1  1  1      Coagulation Recent Labs      10/22/17   0551  10/21/17   1535   PTP  13.7  13.9   INR  1.1  1.1   APTT  36.4  33.0       Liver Enzymes Recent Labs      10/23/17   0150   TP  6.6   ALB  2.7*   AP  178*   SGOT  16      ABG Lab Results   Component Value Date/Time    PHI 7.327 (L) 10/24/2017 04:27 AM    PCO2I 34.3 (L) 10/24/2017 04:27 AM    PO2I 142 (H) 10/24/2017 04:27 AM    HCO3I 18.0 (L) 10/24/2017 04:27 AM    FIO2I 40 10/24/2017 04:27 AM      Microbiology Recent Labs      10/22/17   1900  10/21/17   1700  10/21/17   1610   CULT  NO GROWTH 2 DAYS  FEW NORMAL RESPIRATORY CARLOS ALBERTO  PENDING  NO GROWTH 3 DAYS  NO GROWTH 3 DAYS          Telemetry: []Sinus []A-flutter [x]Paced    []A-fib []Multiple PVCs                    Imaging:  CXR 10/24:  1. Enteric tube with sidehole at the GE junction. Consider advancing by 5 cm. 2.  Left-sided pneumothorax which is improved. ECHO 10/23: consistent with hypertrophic cardiomyopathy  EF 65-70%, no LV wall motion abnormalities, mildly dilated atria, moderate pulmonic valve regurg    [x]I have personally reviewed the patients radiographs  [x]Radiographs reviewed with radiologist   []No change from prior, tubes and lines in adequate position  [x]Improved   []Worsening        IMPRESSION:   76 yo female with hx of ESRD on HD w/ worsening dyspnea x 1 month with progression to hypercarbic respiratory failure, now intubated. Large left pleural effusion s/p thoracentesis and chest tube placement, now with persistent L sided pneumothorax w/ suspected trapped lung. · Acute respiratory failure- was a difficult intubation; remains intubated at this time, respiratory acidosis improving  · Left pleural effusion, pneumothorax- s/p throacentesis, chest tube remains in place to wall suction w/ intermittent air leak, pneumothorax improving. Afebrile, no leukocytosis. No growth from resp cx or blood cx at this time. Cardiothoracic surgery following. Transudative vs exudative effusion. Transudative per LDH ratio, exudative per protein ratio. Protein possibly misleading in setting of dialysis. Pleural fluid redrawn by CT surg today for analysis.   · Hypokalemia- 2.7 from 3.6 yesterday  · ESRD on HD  · H/o 2nd deg AV block s/p pacemaker placement w/ NSVT with intermittent pacing and ventricular bigeminy- cardiology following. Pacemaker interrogated today- no prolonged arrhythmias      PLAN:   · Resp -  Remain intubated, vent settings- FiO2 40%, low TV- 350ml, I:E ratio 1:1.8, PEEP 5, cont pulm hygeine, CT chest per CT surgery, daily CXR  · ID - continue empiric cefepime, vanc. Trend WBC, follow up cx's- pleural, BAL, blood  · CVS - hemodynamically stable, pacemaker w/ normal device function.  Amiodarone d/c'd per cardiology recs, continue to monitor  · Heme/onc -   H/H stable, Epo, continue to monitor  · Metabolic - replete K, monitor electrolytes daily   · Renal - continue HD Tue, Thur, Sat  · Endocrine - continue to monitor  · Neuro/ Pain/ Sedation - continue fentanyl  · GI - continue tube feeds with nepro at goal 45 mL/hr  · Prophylaxis - DVT, GI  · Discussed in interdisciplinary rounds          The patient is: [] acutely ill Risk of deterioration: [x] moderate    [x] critically ill  [] high       My assessment/plan was discussed with:  [x]nursing []PT/OT    []respiratory therapy [x]Dr. Alberta Mendosa   []family []     [x]Total critical care time exclusive of procedures  40     minutes    Giulia Zuñiga MD

## 2017-10-24 NOTE — PROGRESS NOTES
[facility]      Minimal interval change in left hydropneumothorax/trapped lung. Intermittent air leak on 30 suction. Unclear body fluid data source from 10/22/17 (BAL vs. Pleural fluid), micro on pleural fluid, 10/22/17 No growth x 2 days,  sputum Normal adeline, blood cultures NGTD. Both the bronch and thoracentesis were done on 10/22/17 and procedure notes indicate fluid was sent for analysis but only one resulted in EMR. I contacted pathology at [facility] who state they only have one sample for cytology for this patient (read out as nonmalignant, chronic inflammation), the source is pleural fluid not BAL. PLAN:   I aspirated samples from the chest tube for exudate/transudate evaluation and micro. Have ordered CT chest to assess hydropneumothorax and extent of likely pleural peel and presence of any masses/adenopathy. Discussed with ICU team and patient's daughters. CARDIOTHORACIC ATTENDING STAFF NOTE    Patient resting comfortably in bed, still on vent this morning, though awake and alert. No significant events in past 24 hours. Notes mild pain at chest tube site. Stable VS.     Air leak not present today. Drainage from chest tube last 24 hours: 220 cc, serosanguinous in nature. Hct 28  K+ 2.7  creat 0.6    CXR with slight decrease in size of pneumothorax. Discussed case with PAs on our service. Continue present care, chest tube to suction for now. I suspect that, since the lung is not expanding with positive pressure ventilation, that it is trapped and we should start thinking about decortication. I discussed this in detail with the patient and her family. We will obtain a chest CT scan to evaluate the anatomy and see of there are any intraparenchymal or other lesions that may be causing the effusion. Also sent off for microbiologic analysis and labs. Discussed with patient and family.     Rylie Medrano MD

## 2017-10-24 NOTE — PROGRESS NOTES
[facility]  Progress Note    Patient: Hugh Griffin Age: 75 y.o. : 1950 MR#: [id number] SSN: [id number]  Date: 10/24/2017     Subjective: pt is awake, follows commands. Only c/o is of persistent cough    Assessment:   Acute hypercarbic respiratory failure with respiratory acidosis:  Etiology unclear at this time. Now intubated. ABG improved. Large left pleural effusion:  Thoracentesis completed 10/22/17; A total of 1300 cc of serosanginous fluid, was aspirated and sent for analysis. ESRD on HD  Anemia of chronic disease:  Stable  Mild hyponatremia  Ventricular Tachycardia  Pneumothorax  Secondary HPTH       Plan:   Continue to monitor in ICU, intubated, ventilated. Thoracentesis results: RBC: 8738, nucleated cells: 213. Continue Vancomycin and cefepime  HD as per nephrology  Follow CBC, CMP  CXR with improved pneumonothorax  F/u blood cx: neg so far  NGT in place, continue tube feeding  VT noted on Tele. Has post dual-chamber permanent pacemaker In . AV paced. S/p chest tube for pneumothorax. CXr on 10/23/17 shows: Persistent moderate left-sided pneumothorax despite a pigtail drainage catheter placement. Suspect trapped lung. CT surgery following. Chest CT pending ? Intraparenchymal or other lesions that may be causing effusion. Cont epo per Nephrology  Cardiology notes appreciated - no prolonged arhythmias per pacemaker interrogation; amiodarone stopped. Add metoprolol low dose when BP allows.       Case discussed with:  [x]Patient  [x]Family  [x]Nursing  []Case Management  DVT Prophylaxis:  []Lovenox  [x]Hep SQ  []SCDs  []Coumadin   []On Heparin gtt    Objective:   VS:   Visit Vitals    BP 90/45 (BP 1 Location: Right arm, BP Patient Position: At rest)    Pulse 68    Temp 98.8 °F (37.1 °C)    Resp 20    Ht 5' 5\" (1.651 m)    Wt 82 kg (180 lb 12.4 oz)    SpO2 100%    BMI 30.08 kg/m2      Tmax/24hrs: Temp (24hrs), Av °F (36.7 °C), Min:97.5 °F (36.4 °C), Max:98.8 °F (37.1 °C)      Intake/Output Summary (Last 24 hours) at 10/24/17 1714  Last data filed at 10/24/17 1300   Gross per 24 hour   Intake          1045.13 ml   Output              180 ml   Net           865.13 ml     General: awake, intubated, follows commands, appears in no distress  HEENT: non icteric, ET/NGT in place  Neck: No JVD  Cardiovascular: RRR  C/L: bilateral vent breath sounds, no rales/wheezing. Abdomen: soft, + BS  Ext: Right femoral HD cath, no LE edema   Additional:      Labs:    Recent Results (from the past 24 hour(s))   GLUCOSE, POC    Collection Time: 10/23/17  5:34 PM   Result Value Ref Range    Glucose (POC) 69 (L) 70 - 110 mg/dL   GLUCOSE, POC    Collection Time: 10/23/17  5:36 PM   Result Value Ref Range    Glucose (POC) 88 70 - 110 mg/dL   GLUCOSE, POC    Collection Time: 10/24/17 12:07 AM   Result Value Ref Range    Glucose (POC) 88 70 - 110 mg/dL   CBC WITH AUTOMATED DIFF    Collection Time: 10/24/17  3:40 AM   Result Value Ref Range    WBC 9.1 4.6 - 13.2 K/uL    RBC 2.82 (L) 4.20 - 5.30 M/uL    HGB 9.3 (L) 12.0 - 16.0 g/dL    HCT 28.2 (L) 35.0 - 45.0 %    .0 (H) 74.0 - 97.0 FL    MCH 33.0 24.0 - 34.0 PG    MCHC 33.0 31.0 - 37.0 g/dL    RDW 13.9 11.6 - 14.5 %    PLATELET 012 972 - 452 K/uL    MPV 10.4 9.2 - 11.8 FL    NEUTROPHILS 85 (H) 40 - 73 %    LYMPHOCYTES 6 (L) 21 - 52 %    MONOCYTES 8 3 - 10 %    EOSINOPHILS 1 0 - 5 %    BASOPHILS 0 0 - 2 %    ABS. NEUTROPHILS 7.7 1.8 - 8.0 K/UL    ABS. LYMPHOCYTES 0.6 (L) 0.9 - 3.6 K/UL    ABS. MONOCYTES 0.8 0.05 - 1.2 K/UL    ABS. EOSINOPHILS 0.1 0.0 - 0.4 K/UL    ABS.  BASOPHILS 0.0 0.0 - 0.1 K/UL    DF AUTOMATED     METABOLIC PANEL, BASIC    Collection Time: 10/24/17  3:40 AM   Result Value Ref Range    Sodium 137 136 - 145 mmol/L    Potassium 2.7 (LL) 3.5 - 5.5 mmol/L    Chloride 107 100 - 108 mmol/L    CO2 18 (L) 21 - 32 mmol/L    Anion gap 12 3.0 - 18 mmol/L    Glucose 117 (H) 74 - 99 mg/dL    BUN 43 (H) 7.0 - 18 MG/DL    Creatinine 6.06 (H) 0.6 - 1.3 MG/DL    BUN/Creatinine ratio 7 (L) 12 - 20      GFR est AA 8 (L) >60 ml/min/1.73m2    GFR est non-AA 7 (L) >60 ml/min/1.73m2    Calcium 8.2 (L) 8.5 - 10.1 MG/DL   MAGNESIUM    Collection Time: 10/24/17  3:40 AM   Result Value Ref Range    Magnesium 2.1 1.6 - 2.6 mg/dL   PHOSPHORUS    Collection Time: 10/24/17  3:40 AM   Result Value Ref Range    Phosphorus 2.4 (L) 2.5 - 4.9 MG/DL   VANCOMYCIN, RANDOM    Collection Time: 10/24/17  3:40 AM   Result Value Ref Range    Vancomycin, random 22.2 5.0 - 40.0 UG/ML   POC G3    Collection Time: 10/24/17  4:27 AM   Result Value Ref Range    Device: VENT      FIO2 (POC) 40 %    pH (POC) 7.327 (L) 7.35 - 7.45      pCO2 (POC) 34.3 (L) 35.0 - 45.0 MMHG    pO2 (POC) 142 (H) 80 - 100 MMHG    HCO3 (POC) 18.0 (L) 22 - 26 MMOL/L    sO2 (POC) 99 (H) 92 - 97 %    Base deficit (POC) 8 mmol/L    Mode ASSIST CONTROL      Tidal volume 350 ml    Set Rate 20 bpm    PEEP/CPAP (POC) 5 cmH2O    Allens test (POC) YES      Total resp. rate 20      Site RIGHT RADIAL      Patient temp. 37.0      Specimen type (POC) ARTERIAL      Performed by Sonia Argueta     Volume control plus YES     GLUCOSE, POC    Collection Time: 10/24/17  5:12 AM   Result Value Ref Range    Glucose (POC) 90 70 - 110 mg/dL   CULTURE, BODY FLUID W GRAM STAIN    Collection Time: 10/24/17 11:30 AM   Result Value Ref Range    Special Requests: NO SPECIAL REQUESTS      GRAM STAIN RARE WBC'S      GRAM STAIN NO ORGANISMS SEEN      Culture result: PENDING    CELL COUNT AND DIFF, BODY FLUID    Collection Time: 10/24/17 11:30 AM   Result Value Ref Range    BODY FLUID TYPE PLEURAL FLUID      FLUID COLOR RED      FLUID APPEARANCE HAZY      FLUID RBC CT. (A) NRRE /cu mm     SPECIMEN GROSSLY BLOODY. RBC'S TOO NUMEROUS TO COUNT.     FLUID NUCLEATED CELLS 147 (A) NRRE /cu mm    FLD NEUTROPHILS 14 %    FLD BANDS 0 %    FLD LYMPHS 80 %    FLD MONOCYTES 6 %    FLD EOSINS 0 %    WBC COMMENTS NO MESOTHELIAL CELLS SEEN    GLUCOSE, FLUID    Collection Time: 10/24/17 11:30 AM   Result Value Ref Range    Fluid Type: PLEURAL FLUID      Glucose, body fld. 78 MG/DL   PROTEIN TOTAL, FLUID    Collection Time: 10/24/17 11:30 AM   Result Value Ref Range    Fluid Type: PLEURAL FLUID      Protein total, body fld. 4.0 g/dL   GLUCOSE, POC    Collection Time: 10/24/17 11:38 AM   Result Value Ref Range    Glucose (POC) 86 70 - 110 mg/dL   HEP B SURFACE AG    Collection Time: 10/24/17  3:48 PM   Result Value Ref Range    Hepatitis B surface Ag <0.10 <1.00 Index    Hep B surface Ag Interp.  NEGATIVE  NEG       Signed By: Mary Hirsch NP     October 24, 2017 4:21 PM

## 2017-10-24 NOTE — PROGRESS NOTES
[facility]  -  Progress Note      Patient: Lucia Andersen MRN: [id number]  SSN: [id number]    YOB: 1950  Age: 75 y.o. Sex: female      Admit Date: 10/21/2017    Assessment:     Hospital Problems  Date Reviewed: 4/17/2017          Codes Class Noted POA    Acute respiratory failure ([facility] ICD-10-CM: J96.00  ICD-9-CM: 518.81  10/21/2017 Unknown        Pleural effusion ICD-10-CM: J90  ICD-9-CM: 511.9  10/21/2017 Unknown        ESRD on dialysis [facility]) ICD-10-CM: N18.6, Z99.2  ICD-9-CM: 585.6, V45.11  10/21/2017 Unknown        ESRD (end stage renal disease) ([facility] ICD-10-CM: N18.6  ICD-9-CM: 585.6  10/21/2017 Unknown        Respiratory failure ([facility] ICD-10-CM: J96.90  ICD-9-CM: 518.81  10/21/2017 Unknown        Altered mental status ICD-10-CM: R41.82  ICD-9-CM: 780.97  10/21/2017 Unknown              -Acute hypercarbic respiratory failure with respiratory acidosis, intubated  -NSVT in setting of respiratory failure, PTX, pleural effusion  -Hypertrophic cardiomyopathy on echo 10/23/2017: markedly increased LV wall thickness with dynamic obstruction at rest and mid-cavity obliteration and peak gradient 38 mmHg.  -Echo 10/2017: small LV cavity, EF 65-70%, no obvious wall motion abnormalities, mildly dilated LA/RA, moderate KS  -Hx high-grade 2nd degree AV block. Pt with documented Mobitz type II, second-degree AV block and 2:1 AV block with near syncope.   -S/p PPM placement - [employer] Adapta [id number]  -Large left pleural effusion, cardiothoracic surgery following  -PTX  -Hx HTN  -ESRD, on HD T/R/S   -Anemia of chronic disease  -Secondary hyperparathyroidism of CKD  -Hx upper extremity DVT  -Quadriplegic, bed bound   -History of CVA with right sided weakness   -HCVD with severe LVH and normal EF 65% 2011, hyperdynamic LF function 75-80% on echo this admission   -Hx sleep apnea, but apparently improved with wt loss    Primary cardiologist is Dr. Iris Rodriguez:     -Tom Jordan interrogation with normal function, no VHR events since last interrogation 04/2017.  -Continue Amiodarone.  -Pt with noted hypertrophic cardiomyopathy on echo yesterday. Pt with noted hypotension this morning, would consider starting BB as hemodynamics allow.  -Pt hypokalemic today, would replace per ICU protocol.  -Pending CT chest today per cardiothoracic surgery team.    Subjective:     Alert, intubated. Is able to indicate yes/no to questions, denies pain.     Objective:      Patient Vitals for the past 8 hrs:   Temp Pulse Resp BP SpO2   10/24/17 1140 - 62 20 - 100 %   10/24/17 0900 - 69 20 (!) 87/49 -   10/24/17 0800 97.6 °F (36.4 °C) 69 20 112/53 100 %   10/24/17 0722 - 83 20 - 100 %   10/24/17 0700 - 66 20 96/52 -   10/24/17 0600 - 63 20 114/54 -   10/24/17 0500 - 63 20 109/56 100 %         Patient Vitals for the past 96 hrs:   Weight   10/24/17 0601 82 kg (180 lb 12.4 oz)   10/23/17 0600 77.2 kg (170 lb 3.1 oz)   10/23/17 0400 77.2 kg (170 lb 3.1 oz)   10/21/17 2235 91.6 kg (202 lb)         Intake/Output Summary (Last 24 hours) at 10/24/17 1239  Last data filed at 10/24/17 0601   Gross per 24 hour   Intake           775.13 ml   Output              200 ml   Net           575.13 ml       Physical Exam:  General:  alert, cooperative, intubated  Lungs:  clear to auscultation bilaterally to anterior lung fields  Heart:  regular rate and rhythm  Extremities:  no edema    Data Review:     Labs: Results:       Chemistry Recent Labs      10/24/17   0340  10/23/17   0150  10/22/17   0551  10/21/17   1535   GLU  117*  76  82  73*   NA  137  136  135*  133*   K  2.7*  3.6  3.8  3.2*   CL  107  107  104  101   CO2  18*  16*  25  27   BUN  43*  30*  22*  16   CREA  6.06*  4.71*  3.99*  2.92*   CA  8.2*  8.4*  8.3*  8.0*   MG  2.1   --    --   2.1   PHOS  2.4*   --    --    --    AGAP  12  13  6  5   BUCR  7*  6*  6*  5*   AP   --   178*  206*  214*   TP   --   6.6  7.4  8.0   ALB   --   2.7*  3.2*  3.4   GLOB   --   3.9 4. 2*  4.6*   AGRAT   --   0.7*  0.8  0.7*      CBC w/Diff Recent Labs      10/24/17   0340  10/23/17   0150  10/22/17   0551   WBC  9.1  8.2  6.9   RBC  2.82*  3.11*  3.24*   HGB  9.3*  10.5*  11.0*   HCT  28.2*  32.3*  35.1   PLT  151  131*  164   GRANS  85*  86*  77*   LYMPH  6*  6*  16*   EOS  1  1  1      Coagulation Recent Labs      10/22/17   0551  10/21/17   1535   PTP  13.7  13.9   INR  1.1  1.1   APTT  36.4  33.0       Lipid Panel Lab Results   Component Value Date/Time    Cholesterol, total 110 05/16/2011 09:50 AM    HDL Cholesterol 34 05/16/2011 09:50 AM    LDL, calculated 43.6 05/16/2011 09:50 AM    VLDL, calculated 32.4 05/16/2011 09:50 AM    Triglyceride 162 05/16/2011 09:50 AM    CHOL/HDL Ratio 3.2 05/16/2011 09:50 AM      Liver Enzymes Recent Labs      10/23/17   0150   TP  6.6   ALB  2.7*   AP  178*   SGOT  16      Thyroid Studies Lab Results   Component Value Date/Time    TSH 0.89 09/03/2014 07:40 AM

## 2017-10-24 NOTE — PROGRESS NOTES
RENAL DAILY PROGRESS NOTE    Subjective:   Admitted for SOB seen for ESRD and HD    Complaint: remains on vent, awake and following commands. sched fot CT chest with contrast today.  BP was low earlier and has improved with IVF    Current Facility-Administered Medications   Medication Dose Route Frequency    vancomycin (VANCOCIN) 500 mg in 0.9% sodium chloride (MBP/ADV) 100 mL MBP  500 mg IntraVENous ONCE    [START ON 10/26/2017] Vancomycin random level  1 Each Other Rx Dosing/Monitoring    chlorhexidine (PERIDEX) 0.12 % mouthwash 10 mL  10 mL Oral BID    glucose chewable tablet 16 g  4 Tab Oral PRN    glucagon (GLUCAGEN) injection 1 mg  1 mg IntraMUSCular PRN    dextrose (D50W) injection syrg 12.5-25 g  25-50 mL IntraVENous PRN    midazolam (VERSED) injection 1-2 mg  1-2 mg IntraVENous Q4H PRN    cefepime (MAXIPIME) 500 g in 0.9% sodium chloride 100 mL IVPB  500 g IntraVENous Q24H    famotidine (PF) (PEPCID) 20 mg in sodium chloride 0.9 % 10 mL injection  20 mg IntraVENous Q24H    0.9% sodium chloride infusion  100 mL/hr IntraVENous DIALYSIS PRN    albumin human 25% (BUMINATE) solution 12.5 g  12.5 g IntraVENous DIALYSIS PRN    doxercalciferol (HECTOROL) 4 mcg/2 mL injection 6 mcg  6 mcg IntraVENous DIALYSIS TUE, THU & SAT    alteplase (CATHFLO) 2 mg in sterile water (preservative free) 2 mL injection  2 mg InterCATHeter ONCE PRN    heparin (porcine) 1,000 unit/mL injection 1,000 Units  1,000 Units InterCATHeter DIALYSIS PRN    epoetin windy (EPOGEN;PROCRIT) injection 1,000 Units  1,000 Units IntraVENous DIALYSIS TUE, THU & SAT    VANCOMYCIN INFORMATION NOTE   Other Rx Dosing/Monitoring    fentaNYL (PF) 10 mcg/mL infusion  0-200 mcg/hr IntraVENous TITRATE    diphenhydrAMINE (BENADRYL) injection 12.5 mg  12.5 mg IntraVENous Q4H PRN    ondansetron (ZOFRAN) injection 4 mg  4 mg IntraVENous Q4H PRN    acetaminophen (TYLENOL) suppository 650 mg  650 mg Rectal Q4H PRN    heparin (porcine) injection 5,000 Units  5,000 Units SubCUTAneous Q12H    albuterol-ipratropium (DUO-NEB) 2.5 MG-0.5 MG/3 ML  3 mL Nebulization Q6H RT           Objective:     Patient Vitals for the past 24 hrs:   Temp Pulse Resp BP SpO2   10/24/17 1300 - 70 20 110/49 100 %   10/24/17 1200 97.5 °F (36.4 °C) 69 20 102/62 100 %   10/24/17 1140 - 62 20 - 100 %   10/24/17 1100 - 60 20 91/42 100 %   10/24/17 1000 - 74 20 108/52 100 %   10/24/17 0900 - 69 20 (!) 87/49 -   10/24/17 0800 97.6 °F (36.4 °C) 69 20 112/53 100 %   10/24/17 0722 - 83 20 - 100 %   10/24/17 0700 - 66 20 96/52 -   10/24/17 0600 - 63 20 114/54 -   10/24/17 0500 - 63 20 109/56 100 %   10/24/17 0410 - 71 21 - 98 %   10/24/17 0400 98 °F (36.7 °C) 65 20 94/44 100 %   10/24/17 0300 - 67 20 90/49 100 %   10/24/17 0200 - 62 20 94/56 100 %   10/24/17 0100 - 78 19 100/67 -   10/24/17 0034 - 75 20 - 98 %   10/24/17 0000 98.3 °F (36.8 °C) 75 20 105/51 98 %   10/23/17 2300 - 72 20 91/48 100 %   10/23/17 2200 - 87 20 123/41 100 %   10/23/17 2115 - 74 20 - 98 %   10/23/17 2100 - 65 19 (!) 112/97 100 %   10/23/17 2000 - 67 20 105/51 100 %   10/23/17 1957 98 °F (36.7 °C) - - - -   10/23/17 1900 - 67 20 123/75 -   10/23/17 1800 - 70 20 (!) 130/37 100 %   10/23/17 1720 - 74 20 - 100 %   10/23/17 1700 - 80 21 122/63 100 %   10/23/17 1600 98.1 °F (36.7 °C) 80 24 140/51 100 %        Weight change: 4.8 kg (10 lb 9.3 oz)     10/22 1901 - 10/24 0700  In: 1568 [I.V.:958]  Out: 1820     Intake/Output Summary (Last 24 hours) at 10/24/17 1512  Last data filed at 10/24/17 1300   Gross per 24 hour   Intake          1085.13 ml   Output              260 ml   Net           825.13 ml     Physical Exam: awake, intubated, appears in no distress    HEENT: non icteric, ET/NGT in place  Neck: No JVD  Cardiovascular: regular no rub  C/L: bilateral vent breath sounds, no rales/wheezing,  left CT to drain  Abdomen: soft + BS  Ext: Right femoral HD cath, no LE edema    Data Review:     LABS:   Hematology:   Recent Labs 10/24/17   0340  10/23/17   0150  10/22/17   0551  10/21/17   1535   WBC  9.1  8.2  6.9  5.1   HGB  9.3*  10.5*  11.0*  11.1*   HCT  28.2*  32.3*  35.1  35.7   Pl 151K  Chemistry:   Recent Labs      10/24/17   0340  10/23/17   0150  10/22/17   0551  10/21/17   1535   BUN  43*  30*  22*  16   CREA  6.06*  4.71*  3.99*  2.92*   CA  8.2*  8.4*  8.3*  8.0*   ALB   --   2.7*  3.2*  3.4   K  2.7*  3.6  3.8  3.2*   NA  137  136  135*  133*   CL  107  107  104  101   CO2  18*  16*  25  27   PHOS  2.4*   --    --    --    GLU  117*  76  82  73*    Vanco random 22.2  Mag 2.1  Blood c/s neg so far    IMPRESSION AND PLAN:   ESRD sched HD today post CT. Hypokalemia replaced IF earlier, will rub with K3 bath   Hypotension better.  IVF prn, No UF during HD  Anemia cont Epo with HD  2nd HPTH cont with hectorol with HD, low phos replace as needed  Bilateral pleural effusion, management per pulmo, on empiric antibiotics, trend vanco trough  Adjust per pharmacy protocol         Napoleon Velasco MD  10/24/2017

## 2017-10-24 NOTE — PROGRESS NOTES
NUTRITION    Nutrition Screen      RECOMMENDATIONS / PLAN:     - Continue tube feeding of Nepro at goal rate of 45 mL/hr with 100 mL q 6 hour water flushes.   - Continue RD inpatient monitoring and evaluation. Goal Regimen: Nepro at 45 mL/hr + 100 mL q 6 hour water flushes to provide: 1944 kcal, 87 gm protein, 104 gm fat, 180 gm CHO, 17 gm fiber, 783 mL free water, 1183 mL total water, 100% RDIs     NUTRITION INTERVENTIONS & DIAGNOSIS:     [x] Enteral nutrition support: continue   [x] Coordination of care: interdisciplinary rounds    Nutrition Diagnosis: Inadequate oral intake related to inability to tolerate po as evidenced by pt NPO. ASSESSMENT:     10/24: Tolerating feeds at goal. HD today. 10/23: Intubated 10/22. S/p thoracentesis and bronch. ESRD on HD. NGT to low continuous suction, will start feeds today. EN infusion adequate to meet patients estimated nutritional needs:   [x] Yes     [] No   [] Unable to determine at this time    Tube Feeding: Nepro at 45 mL/hr via NGT   Water Flushes: 100 mL q 6 hours   Residuals: 0 mL    Diet: DIET TUBE FEEDING   Food Allergies: NKFA  Current Appetite:   [] Good     [] Fair     [] Poor     [x] Other: NPO  Appetite/meal intake prior to admission:   [] Good     [] Fair     [] Poor     [x] Other: unknown  Feeding Limitations:  [] Swallowing difficulty    [] Chewing difficulty    [x] Other: intubated  Current Meal Intake: No data found.     Anuric   BM: 10/21   Skin Integrity: WDL; chest tube   Edema: none   Pertinent Medications: Reviewed: Zofran, pepcid, 40 mEq KCl      Recent Labs      10/24/17   0340  10/23/17   0150  10/22/17   0551  10/21/17   1535   NA  137  136  135*  133*   K  2.7*  3.6  3.8  3.2*   CL  107  107  104  101   CO2  18*  16*  25  27   GLU  117*  76  82  73*   BUN  43*  30*  22*  16   CREA  6.06*  4.71*  3.99*  2.92*   CA  8.2*  8.4*  8.3*  8.0*   MG  2.1   --    --   2.1   PHOS  2.4*   --    --    --    ALB   --   2.7*  3.2*  3.4   SGOT   -- 16  7*  11*   ALT   --   9*  9*  10*       Intake/Output Summary (Last 24 hours) at 10/24/17 1113  Last data filed at 10/24/17 0601   Gross per 24 hour   Intake           895.13 ml   Output              220 ml   Net           675.13 ml       Anthropometrics:  Ht Readings from Last 1 Encounters:   10/24/17 5' 5\" (1.651 m)     Last 3 Recorded Weights in this Encounter    10/23/17 0400 10/23/17 0600 10/24/17 0601   Weight: 77.2 kg (170 lb 3.1 oz) 77.2 kg (170 lb 3.1 oz) 82 kg (180 lb 12.4 oz)     Body mass index is 30.08 kg/(m^2). Obese, Class I    Weight History:   Weight Metrics 10/24/2017 9/21/2017 4/17/2017 2/17/2016 9/29/2015 2/3/2015 11/10/2014   Weight 180 lb 12.4 oz 176 lb 2.4 oz 168 lb 180 lb 8.9 oz 155 lb 6.8 oz 167 lb 172 lb   BMI 30.08 kg/m2 28.43 kg/m2 26.31 kg/m2 28.27 kg/m2 24.34 kg/m2 26.97 kg/m2 27.77 kg/m2        Admitting Diagnosis: Pleural effusion  Acute respiratory failure (HCC)  ESRD on dialysis (HCC)  Acute respiratory failure (HCC)  ESRD (end stage renal disease) (HCC)  Pleural effusion  Respiratory failure (HCC)  Altered mental status  Pertinent PMHx: ESRD on HD, HTN, stroke     Education Needs:        [x] None identified  [] Identified - Not appropriate at this time  []  Identified and addressed - refer to education log  Learning Limitations:   [] None identified  [x] Identified: intubated     Cultural, Christianity & ethnic food preferences:  [x] None identified    [] Identified and addressed     ESTIMATED NUTRITION NEEDS:     Calories: 1936-9992 kcal ([id number]) based on  [x] Actual BW 77 kg     [] IBW   Protein:  gm (1.2-2 gm/kg) based on  [x] Actual BW      [] IBW   Fluid: 8807-9499 mL     MONITORING & EVALUATION:     Nutrition Goal(s):   1. Nutritional needs will be met through adequate oral intake or nutrition support within the next 7 days.   Outcome:  [x] Met/Ongoing    []  Not Met    [] New/Initial Goal      Monitoring:   [x] EN tolerance   [x] EN infusion   [] Supplement intake   [x] GI symptoms/ability to tolerate po diet   [x] Respiratory status   [x] Plan of care      Previous Recommendations (for follow-up assessments only):     [x]   Implemented       []   Not Implemented (RD to address)     [] No Recommendation Made     Discharge Planning: pending ability to tolerate po and plan of care, enteral nutrition until able to tolerate po   [x] Participated in care planning, discharge planning, & interdisciplinary rounds as appropriate      Niko Gr, [address]    Pager: [phone]

## 2017-10-24 NOTE — ROUTINE PROCESS
Bedside, Verbal and Written shift change report given to [address] (oncoming nurse) by Lorne Lanes RN (offgoing nurse). Report included the following information SBAR, Kardex, ED Summary, OR Summary, Procedure Summary, Intake/Output, MAR, Accordion, Recent Results and Med Rec Status.

## 2017-10-24 NOTE — PROGRESS NOTES
attended the interdisciplinary rounds for [facility], who is a 75 y.o.,female. Patients Primary Language is: [place]. According to the patients EMR Taoism Affiliation is: [facility] [employer] [facility].     The reason the Patient came to the hospital is:   Patient Active Problem List    Diagnosis Date Noted    Acute respiratory failure ([address] 10/21/2017    Pleural effusion 10/21/2017    ESRD on dialysis ([address] 10/21/2017    ESRD (end stage renal disease) ([address] 10/21/2017    Respiratory failure ([address] 10/21/2017    Altered mental status 10/21/2017    Clotted dialysis access ([address] 09/21/2017    Arteriovenous fistula thrombosis ([address] 09/21/2017    DVT (deep venous thrombosis) ([address] 02/17/2016    High-grade AV block 02/17/2016    Essential hypertension 02/17/2016    End-stage renal disease needing dialysis ([address] 09/29/2015    Vomiting 02/03/2015    Diarrhea 02/03/2015    Abdominal pain 02/03/2015    Cough 02/03/2015    Myalgia 02/03/2015    Gastroenteritis 02/03/2015    HCVD (hypertensive cardiovascular disease) 10/15/2014    Second degree heart block 10/15/2014    Near syncope 09/02/2014    Renal failure 06/26/2014    UTI (urinary tract infection) 06/26/2014    Anemia requiring transfusions 06/26/2014    End stage renal failure untreated by renal replacement therapy ([address] 06/26/2014        Plan:  Chaplains will continue to follow and will provide pastoral care on an as needed/requested basis.  recommends bedside caregivers page  on duty if patient shows signs of acute spiritual or emotional distress.     [address] [facility]  Board Certified [address] [facility]   [phone]

## 2017-10-24 NOTE — PROGRESS NOTES
[facility]. Pulmonary, Critical Care, and Sleep Medicine    Name: Kartik Preciado MRN: [id number]   : 1950 Hospital: [address] [place] [address] Dr   Date: 10/24/2017  Admission Date: 10/21/2017     74 yo woman with ESRD on dialysis, large L and small R pleural transudative effusions, with complete collapse of L lung due to compression from L effusion, s/p thoracentesis and pigtail chest catheter placement, with incomplete L lung expansion and pneumothorax. Overnight stable, with decreased leak of L chest tube and total drainage 600cc. Patient is arousable on the ventilator today. No complaints. 2 daughters at bedside were updated on her condition and plans. New cultures from pleural fluid obtained today. [x]The patient is critically ill on      [x]Mechanical ventilation []Pressors   []BiPAP []   Events and notes from last 24 hours reviewed. Care plan discussed on multidisciplinary rounds.     Patient Active Problem List   Diagnosis Code    Renal failure N19    UTI (urinary tract infection) N39.0    Anemia requiring transfusions D64.9    End stage renal failure untreated by renal replacement therapy ([facility] N18.6    Near syncope R55    HCVD (hypertensive cardiovascular disease) I11.9    Second degree heart block I44.1    Vomiting R11.10    Diarrhea R19.7    Abdominal pain R10.9    Cough R05    Myalgia M79.1    Gastroenteritis K52.9    End-stage renal disease needing dialysis ([facility] N18.6, Z99.2    DVT (deep venous thrombosis) ([facility]) I82.409    High-grade AV block I44.30    Essential hypertension I10    Clotted dialysis access [facility]) T82.49XA    Arteriovenous fistula thrombosis (HCC) Y32.177N    Acute respiratory failure ([facility]) J96.00    Pleural effusion J90    ESRD on dialysis ([facility] N18.6, Z99.2    ESRD (end stage renal disease) ([facility]) N18.6    Respiratory failure ([facility]) J96.90    Altered mental status R41.82       Vital Signs:  Visit Vitals    /49    Pulse 70    Temp 97.5 °F (36.4 °C)    Resp 20    Ht 5' 5\" (1.651 m)    Wt 82 kg (180 lb 12.4 oz)    SpO2 100%    BMI 30.08 kg/m2             Temp (24hrs), Av.9 °F (36.6 °C), Min:97.5 °F (36.4 °C), Max:98.3 °F (36.8 °C)       Intake/Output Summary (Last 24 hours) at 10/24/17 1446  Last data filed at 10/24/17 1300   Gross per 24 hour   Intake          1105.13 ml   Output              260 ml   Net           845.13 ml      Ventilator Settings:  Ventilator  Mode: Assist control, VC+  Respiratory Rate  Back-Up Rate: 20  Insp Time (sec): 0.9 sec  I:E Ratio: 1:1.8  Ventilator Volumes  Vt Set (ml): 350 ml  Vt Exhaled (Machine Breath) (ml): 464 ml  Vt Spont (ml): 1114 ml  Ve Observed (l/min): 9.25 l/min  Ventilator Pressures  PIP Observed (cm H2O): 25 cm H2O  Plateau Pressure (cm H2O): 21 cm H2O  MAP (cm H2O): 11  PEEP/VENT (cm H2O): 5 cm H20  Auto PEEP Observed (cm H2O): 0 cm H2O  FIO2 40%, weaned to 35%.     Current Facility-Administered Medications   Medication Dose Route Frequency    vancomycin (VANCOCIN) 500 mg in 0.9% sodium chloride (MBP/ADV) 100 mL MBP  500 mg IntraVENous ONCE    [START ON 10/26/2017] Vancomycin random level  1 Each Other Rx Dosing/Monitoring    chlorhexidine (PERIDEX) 0.12 % mouthwash 10 mL  10 mL Oral BID    cefepime (MAXIPIME) 500 g in 0.9% sodium chloride 100 mL IVPB  500 g IntraVENous Q24H    famotidine (PF) (PEPCID) 20 mg in sodium chloride 0.9 % 10 mL injection  20 mg IntraVENous Q24H    doxercalciferol (HECTOROL) 4 mcg/2 mL injection 6 mcg  6 mcg IntraVENous DIALYSIS TUE, THU & SAT    epoetin windy (EPOGEN;PROCRIT) injection 1,000 Units  1,000 Units IntraVENous DIALYSIS TUE, THU & SAT    VANCOMYCIN INFORMATION NOTE   Other Rx Dosing/Monitoring    fentaNYL (PF) 10 mcg/mL infusion  0-200 mcg/hr IntraVENous TITRATE    heparin (porcine) injection 5,000 Units  5,000 Units SubCUTAneous Q12H    albuterol-ipratropium (DUO-NEB) 2.5 MG-0.5 MG/3 ML  3 mL Nebulization Q6H RT       Telemetry: Physical Exam:   General: Intubated, Awake and alert   HEENT:  Anicteric sclerae; pink palpebral conjunctivae; mucosa moist  Resp:  Symmetrical chest expansion, EET in place on vent, equal breath sounds BL, no wheezes or rales  CV:  S1, S2 present; regular rate and rhythm  GI:  Abdomen soft, non-tender; (+) active bowel sounds  Extremities:  no edema/ cyanosis/ clubbing noted   Skin:  Warm; no rashes/ lesions noted, CT L chest wall  Neurologic:  Follows directions, no focal deficits  Devices:  NGT, ETT, chest tube, R femoral HD cath    DATA:    Labs:  Recent Labs      10/24/17   0340  10/23/17   0150  10/22/17   0551   WBC  9.1  8.2  6.9   HGB  9.3*  10.5*  11.0*   HCT  28.2*  32.3*  35.1   PLT  151  131*  164     Recent Labs      10/24/17   0340  10/23/17   0150  10/22/17   0551  10/21/17   1535   NA  137  136  135*  133*   K  2.7*  3.6  3.8  3.2*   CL  107  107  104  101   CO2  18*  16*  25  27   GLU  117*  76  82  73*   BUN  43*  30*  22*  16   CREA  6.06*  4.71*  3.99*  2.92*   CA  8.2*  8.4*  8.3*  8.0*   MG  2.1   --    --   2.1   PHOS  2.4*   --    --    --    ALB   --   2.7*  3.2*  3.4   SGOT   --   16  7*  11*   ALT   --   9*  9*  10*   INR   --    --   1.1  1.1     Recent Labs      10/24/17   0427  10/23/17   0409  10/22/17   2100   FIO2I  40  50  60   HCO3I  18.0*  17.6*  17.9*   PCO2I  34.3*  30.2*  35.7   PHI  7.327*  7.372  7.309*   PO2I  142*  205*  172*     Pleural fluid analysis:  Reviewed. Imaging:  [x]                           I have personally reviewed the patients radiographs and reports    High complexity decision making was performed during this consultation and evaluation. [x]       Pt is at high risk for further organ failure and dysfunction. Critical care time spent 40 minutes with patient exclusive of procedures.     IMPRESSION:   · ESRD on dialysis TTS  · L pleural effusion, transudative, non-infected  · L pneumothorax improving with CT to suction  · Incomplete expansion of L lung with persistent PTX  · Possible pneumonia on empiric vanco  · Acute respiratory failure, on mechanical ventilation  · Paced rhythm, stable cardiac status; off amiodarone  · GI; Obesity; on Nepro 20/hr  · ICU PPX      PLAN:   1. Monitor Culture results  2. Possible VAT per CT surgery  3. Continue chest tube drainage  4. CT chest today; Follow daily CXR  5. Vanco, cefipime  6. ESRD management per renal  7. Maintain on mechanical ventilation, Duoneb  8.   Dispo: critically ill, with high risk of deterioration, multiple conditions presenting immediate threat to life; is on intensive support         Rasheeda Sarmiento MD   Pulmonary and critical care medicine  10/24/2017 3:02 PM

## 2017-10-24 NOTE — DIALYSIS
ACUTE HEMODIALYSIS FLOW SHEET    HEMODIALYSIS ORDERS: Physician: Damien Walter   Dialyzer:  Revaclear    Duration: 3  hr  BFR: 300  DFR: 600   Dialysate:  Temp  36.5  K+  3  Ca+ 2.5   Na 140 Bicarb 30   Weight:  87.7    Bed Scale [x]     Unable to Obtain []        Dry weight/UF Goal:  0 mL   Access:  CVC  Needle Gauge N/A   Heparin []  Bolus      Units    [] Hourly       Units    [x]None     Catheter locking solution: yes   Pre BP:  156/139 Pulse: 63  Temperature: 98.5   Respirations:  20  Tx: NS       ml/Bolus  Other        [x] N/A   Labs: Pre       Post:        [x] N/A    Additional Orders(medications, blood products, hypotension management):   yes    [] N/A     [x] Time Out/Safety Check  [x] [facility] Consent Verified     CATHETER ACCESS: []N/A   [x]Right   []Left   []IJ     [x]Fem   [] First use X-ray verified     [x]Tunnel                [] Non Tunneled   [x]No S/S infection  []Redness  []Drainage []Cultured []Swelling []Pain   [x]Medical Aseptic Prep Utilized   [x]Dressing Changed  [x] Biopatch  Date: 10/24/17   []Clotted   [x]Patent   Flows: []Good  []Poor  [x]Reversed   If access problem,  notified: []Yes    [x]N/A  Date:           GRAFT/FISTULA ACCESS:  [x]N/A     []Right     []Left     []UE     []LE   []AVG   []AVF        []Buttonhole    []Medical Aseptic Prep Utilized   []No S/S infection  []Redness  []Drainage []Cultured []Swelling []Pain    Bruit:   [] Strong    [] Weak       Thrill :   [] Strong    [] Weak       Needle Gauge:   Length:     If access problem,  notified: []Yes     [x]N/A  Date:        Please describe access if present and not used:       GENERAL ASSESSMENT:    LUNGS:  Rate  SaO2 %   [] N/A    [] Clear  [x] Coarse  [] Crackles  [] Wheezing        [] Diminished     Location : []RLL   []LLL    [x]RUL  [x][facility]   Cough: []Productive  []Dry  []N/A   Respirations:  []Easy  []Labored   Therapy:  []RA  []NC  l/min    Mask: []NRB []Venti       O2%                  [x]Ventilator  [x]Intubated  [] Trach  [] BiPaP   CARDIAC: []Regular      [x] Irregular   [] Pericardial Rub  [] JVD        []  Monitored  [x] Bedside  [] Remotely monitored [] N/A  Rhythm:    EDEMA: [] None  []Generalized  [] Pitting [] 1    [] 2    [] 3    [] 4                 [] Facial  [] Pedal  [x]  UE  [] LE   SKIN:   [x] Warm  [] Hot     [] Cold   [x] Dry     [] Pale   [] Diaphoretic                  [] Flushed  [] Jaundiced  [] Cyanotic  [] Rash  [] Weeping   LOC:    [x] Alert      []Oriented:    [x] Person     [] Place  []Time               [] Confused  [] Lethargic  [x] Medicated  [] Non-responsive     GI / ABDOMEN:  [] Flat    [x] Distended    [x] Soft    [] Firm   []  Obese                             [] Diarrhea  [x] Bowel Sounds  [] Nausea  [] Vomiting       / URINE ASSESSMENT:[] Voiding   [x] Oliguria  [] Anuria   []  Saunders     [] Incontinent    []  Incontinent Brief      []  Bathroom Privileges     PAIN: [x] 0 []1  []2   []3   []4   []5   []6   []7   []8   []9   []10            Scale 0-10  Action/Follow Up:    MOBILITY:  [] Amb    [] Amb/Assist    [x] Bed    [] Wheelchair  [] Stretcher      All Vitals and Treatment Details on Attached [address]:   [address]   [x] 1st Time Acute  [] Stat  [x] Routine  [] Urgent     [x] Acute Room  []  Bedside  [x] ICU/CCU  [] ER   Isolation Precautions:  [x] Dialysis   [] Airborne   [] Contact    [] Reverse   Special Considerations:         [] Blood Consent Verified []N/A     ALLERGIES:  Shellfish, iodine (not listed on chart at this time)   Code Status:  [] Full Code  [x] DNR  [] Other           HBsAg ONLY: Date Drawn  10/24/17      [x]Negative []Positive []Unknown   HBsAb:   10/24/17  [x] Susceptible   [] [id number] []Not Drawn  [] Drawn     Current Labs:    Date of Labs: Today [x]     Results for Estelle Andrea (MRN [id number]) as of 10/24/2017 20:40   Ref.  Range 10/24/2017 03:40   WBC Latest Ref Range: 4.6 - 13.2 K/uL 9.1   RBC Latest Ref Range: 4.20 - 5.30 M/uL 2.82 (L)   HGB Latest Ref Range: 12.0 - 16.0 g/dL 9.3 (L)   HCT Latest Ref Range: 35.0 - 45.0 % 28.2 (L)   MCV Latest Ref Range: 74.0 - 97.0 .0 (H)   MCH Latest Ref Range: 24.0 - 34.0 PG 33.0   MCHC Latest Ref Range: 31.0 - 37.0 g/dL 33.0   RDW Latest Ref Range: 11.6 - 14.5 % 13.9   PLATELET Latest Ref Range: 135 - 420 K/uL 151   MPV Latest Ref Range: 9.2 - 11.8 FL 10.4   NEUTROPHILS Latest Ref Range: 40 - 73 % 85 (H)   LYMPHOCYTES Latest Ref Range: 21 - 52 % 6 (L)   MONOCYTES Latest Ref Range: 3 - 10 % 8   EOSINOPHILS Latest Ref Range: 0 - 5 % 1   BASOPHILS Latest Ref Range: 0 - 2 % 0   DF Latest Units:   AUTOMATED   ABS. NEUTROPHILS Latest Ref Range: 1.8 - 8.0 K/UL 7.7   ABS. LYMPHOCYTES Latest Ref Range: 0.9 - 3.6 K/UL 0.6 (L)   ABS. MONOCYTES Latest Ref Range: 0.05 - 1.2 K/UL 0.8   ABS. EOSINOPHILS Latest Ref Range: 0.0 - 0.4 K/UL 0.1   ABS. BASOPHILS Latest Ref Range: 0.0 - 0.1 K/UL 0.0      Results for Eric Velez (MRN [id number]) as of 10/24/2017 20:40   Ref.  Range 10/24/2017 03:40   Sodium Latest Ref Range: 136 - 145 mmol/L 137   Potassium Latest Ref Range: 3.5 - 5.5 mmol/L 2.7 (LL)   Chloride Latest Ref Range: 100 - 108 mmol/L 107   CO2 Latest Ref Range: 21 - 32 mmol/L 18 (L)   Anion gap Latest Ref Range: 3.0 - 18 mmol/L 12   Glucose Latest Ref Range: 74 - 99 mg/dL 117 (H)   BUN Latest Ref Range: 7.0 - 18 MG/DL 43 (H)   Creatinine Latest Ref Range: 0.6 - 1.3 MG/DL 6.06 (H)   BUN/Creatinine ratio Latest Ref Range: 12 - 20   7 (L)   Calcium Latest Ref Range: 8.5 - 10.1 MG/DL 8.2 (L)   Phosphorus Latest Ref Range: 2.5 - 4.9 MG/DL 2.4 (L)   Magnesium Latest Ref Range: 1.6 - 2.6 mg/dL 2.1   GFR est non-AA Latest Ref Range: >60 ml/min/1.73m2 7 (L)   GFR est AA Latest Ref Range: >60 ml/min/1.73m2 8 (L)                                                                                                                              DIET:  [] Renal    [] Other     [x] NPO     []  Diabetic      PRIMARY NURSE REPORT: First initial/Last name/Title      Pre Dialysis:   [place] Alaniz RN @ 5236 and 7693     EDUCATION:    [x] Patient [x] Other         Knowledge Basis: []None [x]Minimal [] Substantial   Barriers to learning  []N/A   [] Access Care     [] S&S of infection     [] Fluid Management     []K+     [x]Procedural    [x]Albumin     [] Medications     [] Tx Options     [] Transplant     [] Diet     [] Other   Teaching Tools:  [x] Explain  [] Demo  [] Handouts [] Video  Patient response:   [] Verbalized understanding  [] Teach back  [] Return demonstration [x] Requires follow up   Inappropriate due to            [address] Before each treatment:     Machine Number:                   [facility]                                  [] Unit Machine # 4  with centralized RO                                  [x] Portable Machine #1/RO serial # [id number]                                  [] Portable Machine #2/RO serial # [id number]                                  [] Portable Machine #3/RO serial # [id number]                                                                                                       [facility]                                  [] Portable Machine #11/RO serial # [id number]                                   [] Portable Machine #12/RO serial # [id number]                                  [] Portable Machine #13/RO serial #  [id number]      Alarm Test:   Pass time 0497  Other:         [x] RO/Machine Log Complete      Temp  36.5   [x]Extracorporeal Circuit Tested for integrity   Dialysate: Conductivity: Meter HD Machine   14.2              TCD: 14.2  Dialyzer Lot #   [id number]      Blood Tubing Lot #   [id number]    Saline Lot # [id number]     CHLORINE TESTING-Before each treatment and every 4 hours    Total Chlorine: [x] less than 0.1 ppm  Time:  4 Hr/2nd Check Time:    (if greater than 0.1 ppm from Primary then every 30 minutes from Secondary)     TREATMENT INITIATION  with Dialysis Precautions:   [x] All Connections Secured                 [x] Saline Line Double Clamped   [x] Venous Parameters Set                  [x] Arterial Parameters Set    [x] Prime Given  250 ml                       [x]Air Foam Detector Engaged      Treatment Initiation Note: Patient treatment is delayed because she is waiting to get a CT scan after 1700.  @ 1710 at bedside is the MD, the primary nurse notified me that the CT scan is further delayed due to the decision of doing one with a contrast or not. @ [phone] After speaking with the primary nurse at bedside, she notified me that the CT scan will be cancelled due to a shellfish allergy. The patient is alert and laying in bed at a 5 degree angle, wrist restraints are on both arms and tied to bed frame. She is able to nod yes and nom appropriately. @ [phone], HD treatment is started in right Fem LE. 2015, the NP notified me that she will be contacting nephrology for permission to access CVC in order to infuse Levophed, and d/c the heparin lock. @2036, range of motion exercises performed on both arms and wrists. Medication Dose Volume Route Initials Dialyzer Cleared: [x] Good [] Fair  [] Poor    Blood processed:  49 L  UF Removed prime and rinseback mL Post Wt:  87.5  kg   POst BP:   116/53 Pulse:  79 Respirations:  26 20Temperature:  97.7      NaCl 0.9%      250 mL prim  250 mL rinse    500 mL    IV          Post Tx Vascular Access: AVF/AVG:   N/A  Minutes pressure held to site:  Art: N/A  Howard:   Epoetin 1000 Units 0.5 mL        Catheter: Locking solution: Heparin 1:1000 Art. 2.3 mL  Howard. 2.4 mL     Hectorol   6 mcg. 3 mL.         Post Assessment:      Albumin   12.5 G     50 mL                 Skin:  [x] Warm  [x] Dry [] Diaphoretic    [] Flushed  [] Pale [] Cyanotic   [facility] Signatures Title Initials  Time Lungs: [] Clear    [x] Course  [] Crackles  [] Wheezing [] Diminished        Cardiac: [] Regular   [x] Irregular   [] Monitor  [] N/A  Rhythm: PVC's;  pacemaker        Edema:  [] None    [] General     [] Facial   [] Pedal    [x] UE    [] LE       Pain: [x]0  []1  []2   []3  []4   []5   []6   []7   []8   []9   []10         Post Treatment Note:  HD treatment complete. Patient tolerated fairly, asymptomatic with no complaints. Primary nurse is at bedside.      POST TREATMENT PRIMARY NURSE HANDOFF REPORT:     First initial/Last name/Title         Post Dialysis:Time: CASA Segovia @ 2000     Abbreviations: AVG-arterial venous graft, AVF-arterial venous fistula, IJ-Internal Jugular, Subcl-Subclavian, Fem-Femoral, Tx-treatment, AP/HR-apical heart rate, DFR-dialysate flow rate, BFR-blood flow rate, AP-arterial pressure, -venous pressure, UF-ultrafiltrate, TMP-transmembrane pressure, Howard-Venous, Art-Arterial, RO-Reverse Osmosis

## 2017-10-25 NOTE — PROGRESS NOTES
attended the interdisciplinary rounds for [facility], who is a 75 y.o.,female. Patients Primary Language is: [place]. According to the patients EMR Jain Affiliation is: [facility].     The reason the Patient came to the hospital is:   Patient Active Problem List    Diagnosis Date Noted    Acute respiratory failure ([address] 10/21/2017    Pleural effusion 10/21/2017    ESRD on dialysis ([address] 10/21/2017    ESRD (end stage renal disease) ([address] 10/21/2017    Respiratory failure ([address] 10/21/2017    Altered mental status 10/21/2017    Clotted dialysis access ([address] 09/21/2017    Arteriovenous fistula thrombosis ([address] 09/21/2017    DVT (deep venous thrombosis) ([address] 02/17/2016    High-grade AV block 02/17/2016    Essential hypertension 02/17/2016    End-stage renal disease needing dialysis ([address] 09/29/2015    Vomiting 02/03/2015    Diarrhea 02/03/2015    Abdominal pain 02/03/2015    Cough 02/03/2015    Myalgia 02/03/2015    Gastroenteritis 02/03/2015    HCVD (hypertensive cardiovascular disease) 10/15/2014    Second degree heart block 10/15/2014    Near syncope 09/02/2014    Renal failure 06/26/2014    UTI (urinary tract infection) 06/26/2014    Anemia requiring transfusions 06/26/2014    End stage renal failure untreated by renal replacement therapy ([address] 06/26/2014      Plan:  Chaplains will continue to follow and will provide pastoral care on an as needed/requested basis.  recommends bedside caregivers page  on duty if patient shows signs of acute spiritual or emotional distress.     [address] [facility] [employer]  Board Certified [facility]   [phone]

## 2017-10-25 NOTE — PROGRESS NOTES
RENAL DAILY PROGRESS NOTE    Subjective:   Admitted for SOB seen for ESRD and HD    Complaint: Events of last night noted, BP better on low dose pressor.   Tolerating NGT feeding    Current Facility-Administered Medications   Medication Dose Route Frequency    [START ON 10/26/2017] Vancomycin random level  1 Each Other Rx Dosing/Monitoring    NOREPINephrine (LEVOPHED) 16,000 mcg in dextrose 5% 250 mL infusion  2-16 mcg/min IntraVENous TITRATE    chlorhexidine (PERIDEX) 0.12 % mouthwash 10 mL  10 mL Oral BID    glucose chewable tablet 16 g  4 Tab Oral PRN    glucagon (GLUCAGEN) injection 1 mg  1 mg IntraMUSCular PRN    dextrose (D50W) injection syrg 12.5-25 g  25-50 mL IntraVENous PRN    midazolam (VERSED) injection 1-2 mg  1-2 mg IntraVENous Q4H PRN    cefepime (MAXIPIME) 500 g in 0.9% sodium chloride 100 mL IVPB  500 g IntraVENous Q24H    famotidine (PF) (PEPCID) 20 mg in sodium chloride 0.9 % 10 mL injection  20 mg IntraVENous Q24H    0.9% sodium chloride infusion  100 mL/hr IntraVENous DIALYSIS PRN    doxercalciferol (HECTOROL) 4 mcg/2 mL injection 6 mcg  6 mcg IntraVENous DIALYSIS TUE, THU & SAT    alteplase (CATHFLO) 2 mg in sterile water (preservative free) 2 mL injection  2 mg InterCATHeter ONCE PRN    heparin (porcine) 1,000 unit/mL injection 1,000 Units  1,000 Units InterCATHeter DIALYSIS PRN    epoetin windy (EPOGEN;PROCRIT) injection 1,000 Units  1,000 Units IntraVENous DIALYSIS TUE, THU & SAT    VANCOMYCIN INFORMATION NOTE   Other Rx Dosing/Monitoring    fentaNYL (PF) 10 mcg/mL infusion  0-200 mcg/hr IntraVENous TITRATE    diphenhydrAMINE (BENADRYL) injection 12.5 mg  12.5 mg IntraVENous Q4H PRN    ondansetron (ZOFRAN) injection 4 mg  4 mg IntraVENous Q4H PRN    acetaminophen (TYLENOL) suppository 650 mg  650 mg Rectal Q4H PRN    heparin (porcine) injection 5,000 Units  5,000 Units SubCUTAneous Q12H    albuterol-ipratropium (DUO-NEB) 2.5 MG-0.5 MG/3 ML  3 mL Nebulization Q6H RT Objective:     Patient Vitals for the past 24 hrs:   Temp Pulse Resp BP SpO2   10/25/17 0733 99 °F (37.2 °C) - - - -   10/25/17 0700 - 66 21 138/44 100 %   10/25/17 0630 - 76 20 111/41 100 %   10/25/17 0600 - 79 12 125/41 100 %   10/25/17 0530 - 80 18 (!) 110/38 100 %   10/25/17 0500 - 72 16 122/40 -   10/25/17 0430 - 76 20 (!) 106/37 100 %   10/25/17 0400 99 °F (37.2 °C) 68 19 116/44 100 %   10/25/17 0343 - 77 20 - 99 %   10/25/17 0340 - 80 15 109/45 100 %   10/25/17 0320 - 71 11 (!) 153/126 100 %   10/25/17 0300 - 74 17 106/46 100 %   10/25/17 0240 - 65 10 120/56 100 %   10/25/17 0220 - 72 20 98/45 100 %   10/25/17 0200 - 75 20 115/68 99 %   10/25/17 0140 - 77 20 (!) 99/39 100 %   10/25/17 0125 - - - - 100 %   10/25/17 0120 - 76 20 91/40 100 %   10/25/17 0108 - 72 20 - 100 %   10/25/17 0100 - 60 20 142/72 100 %   10/25/17 0000 97.8 °F (36.6 °C) - 19 - 98 %   10/24/17 2300 - 69 20 91/43 100 %   10/24/17 2230 - 73 20 (!) 79/40 100 %   10/24/17 2208 - 79 22 116/53 -   10/24/17 2200 97.7 °F (36.5 °C) 82 23 130/55 100 %   10/24/17 2145 - 82 20 138/52 100 %   10/24/17 2130 - 84 20 121/75 100 %   10/24/17 2115 - 71 20 126/66 -   10/24/17 2100 - 68 20 120/41 100 %   10/24/17 2045 - 71 20 99/43 100 %   10/24/17 2030 - 72 21 107/52 -   10/24/17 2020 - 71 20 - 100 %   10/24/17 2015 - 69 20 (!) 97/28 100 %   10/24/17 2000 98.3 °F (36.8 °C) 70 20 (!) 106/36 100 %   10/24/17 1945 - 73 22 101/40 100 %   10/24/17 1930 - 71 21 (!) 112/38 100 %   10/24/17 1915 - 64 20 97/43 -   10/24/17 1900 - 64 20 126/52 100 %   10/24/17 1857 - 67 - 108/64 -   10/24/17 1850 98.5 °F (36.9 °C) 66 20 (!) 156/139 100 %   10/24/17 1800 - 84 25 100/52 -   10/24/17 1700 - 67 20 (!) 84/41 100 %   10/24/17 1600 98.8 °F (37.1 °C) 68 20 90/45 100 %   10/24/17 1500 - 66 20 (!) 89/44 -   10/24/17 1400 - 74 20 (!) 75/48 100 %   10/24/17 1300 - 70 20 110/49 100 %   10/24/17 1200 97.5 °F (36.4 °C) 69 20 102/62 100 %   10/24/17 1140 - 62 20 - 100 % 10/24/17 1100 - 60 20 91/42 100 %   10/24/17 1000 - 74 20 108/52 100 %        Weight change: 0 kg (0 lb)     10/23 1901 - 10/25 0700  In: 2209.4 [I.V.:468.4]  Out: 975     Intake/Output Summary (Last 24 hours) at 10/25/17 0920  Last data filed at 10/25/17 0700   Gross per 24 hour   Intake          1574.31 ml   Output              855 ml   Net           719.31 ml     Physical Exam: awake, intubated, appears in no distress    HEENT: non icteric, ET/NGT in place  Neck: No JVD  Cardiovascular: regular with premature beats, no rub  C/L: bilateral vent breath sounds, no rales/wheezing,  left CT to drain  Abdomen: soft + BS  Ext: Right femoral HD cath, no LE edema    Data Review:     LABS:   Hematology:   Recent Labs      10/25/17   0241  10/24/17   0340  10/23/17   0150   WBC  10.4  9.1  8.2   HGB  9.3*  9.3*  10.5*   HCT  27.3*  28.2*  32.3*   Pl 145K  Chemistry:   Recent Labs      10/25/17   0241  10/24/17   0340  10/23/17   0150   BUN  26*  43*  30*   CREA  4.18*  6.06*  4.71*   CA  8.1*  8.2*  8.4*   ALB   --    --   2.7*   K  3.6  2.7*  3.6   NA  136  137  136   CL  107  107  107   CO2  20*  18*  16*   PHOS  1.6*  2.4*   --    GLU  107*  117*  76    Vanco random 22.2  Mag 2.1  Blood c/s neg so far  CT chest pend results    IMPRESSION AND PLAN:   ESRD sched HD tomorrow. Hypokalemia improved   Low Phos replace per NGT  Hypotension better.  IVF prn, D/C vasopressor discussed with BERN[facility]  Anemia cont Epo with HD  2nd HPTH cont with hectorol with HD  Bilateral pleural effusion, management per pulmo, on empiric antibiotics, trend vanco trough  Adjust per pharmacy protocol         Raffy Fritz MD  10/25/2017

## 2017-10-25 NOTE — PROGRESS NOTES
CARDIOTHORACIC ATTENDING STAFF NOTE    Patient resting comfortably in bed, awake and alert. No significant events in past 24 hours. Remains on pressors. Dialyzed yesterday. Stable VS.        Drainage from chest tube last 24 hours: 310 cc, serous. Hct 27.3  K+ 3.6  creat 4.2    CXR with continued hydropneumothorax    Discussed case with PA on our service and with intensivist, Dr Samy Mcallister. The CT scan shows extensive trapping of the entire left lung with a thick peel present and fluid remaining with a large ptx. There is also densities in the periphery of both the left upper and left lower lobes. There are also multiple masses that may represent atelectatic lung, but also could be a malignancy (especially the one in the left upper lobe measuring 8 cm x 1.3 cm). I do not think, given this extent of the disease process, that it will resolve with positive pressure ventilation and negative pressure chest drainage +/- TPA. I think the lung will need to be decorticated via thoracotomy (and we can biopsy any masses that we encounter at the time). I have discussed this procedure with the patient and family members on a couple of occasions, and today reiterated the indications for the operation and the operation itself, along with the alternatives (Pleurex catheter) and accompanying risks and benefits, which would include but not be limited to, operative mortality, bleeding, blood transfusions, pulmonary injury with possible prolonged air leak, re-exploration for bleeding, and arrhythmias. She will likely have a prolonged recovery. They understand and agree to proceed. Although I think it prudent to proceed as soon as possible, I would delay until she is off pressors and stable so that we do not encounter a ish intraoperative course. Continue present care, chest tube to suction for now. Discussed with patient and family.     Shalini Yuan MD

## 2017-10-25 NOTE — PROGRESS NOTES
[facility] Hospitalist Group  Progress Note    Patient: Lucia Andersen Age: 75 y.o. : 1950 MR#: [id number] SSN: [id number]  Date: 10/25/2017     Subjective: pt is awake, follows commands. Only c/o is of persistent cough    Assessment/Plan:   1. Acute hypercarbic respiratory failure with respiratory acidosis -    Persistent left sided pneumonthorax, noted plans per CTS for thoracotomy and decortication once BP more stable / off pressors and plan to biospy intraoperatively. Cont chest tube to suction per CTS. Continue vent management per PCCM. 2.  ESRD on HD - nephrology following. Plan for dialysis 10/26. Normal dialysis days Tues-Thurs - Sat per patient. 3.  Ventricular Tachycardia - in setting of acute illness; s/p PPM in   4. Anemia of chronic disease - cont procrit, stable, follow labs  5. Hypotension - was on low dose pressor earlier today which is since stopped. Follow trends. Add low dose metoprolol as BP allows. 6.  Mild hyponatremia - resolved  7.   Tremor - new onset since admission, in setting of acute illness; follow    Case discussed with:  [x]Patient  [x]Family  [x]Nursing  []Case Management  DVT Prophylaxis:  []Lovenox  [x]Hep SQ  []SCDs  []Coumadin   []On Heparin gtt    Objective:   VS:   Visit Vitals    BP (!) 88/47    Pulse 76    Temp 98.9 °F (37.2 °C)    Resp 25    Ht 5' 5\" (1.651 m)    Wt 82 kg (180 lb 12.4 oz)    SpO2 100%    BMI 30.08 kg/m2      Tmax/24hrs: Temp (24hrs), Av.5 °F (36.9 °C), Min:97.7 °F (36.5 °C), Max:99 °F (37.2 °C)      Intake/Output Summary (Last 24 hours) at 10/25/17 1622  Last data filed at 10/25/17 1300   Gross per 24 hour   Intake          1529.31 ml   Output              795 ml   Net           734.31 ml     General: awake, intubated, follows commands, appears in no distress  HEENT: non icteric, ET/NGT in place  Neck: No JVD  Cardiovascular: RRR  C/L: bilateral vent breath sounds, no rales/wheezing. Abdomen: soft, + BS  Ext: Right femoral HD cath, no LE edema     Labs:    Recent Results (from the past 24 hour(s))   GLUCOSE, POC    Collection Time: 10/24/17  6:13 PM   Result Value Ref Range    Glucose (POC) 103 70 - 110 mg/dL   GLUCOSE, POC    Collection Time: 10/25/17  2:08 AM   Result Value Ref Range    Glucose (POC) 87 70 - 453 mg/dL   METABOLIC PANEL, BASIC    Collection Time: 10/25/17  2:41 AM   Result Value Ref Range    Sodium 136 136 - 145 mmol/L    Potassium 3.6 3.5 - 5.5 mmol/L    Chloride 107 100 - 108 mmol/L    CO2 20 (L) 21 - 32 mmol/L    Anion gap 9 3.0 - 18 mmol/L    Glucose 107 (H) 74 - 99 mg/dL    BUN 26 (H) 7.0 - 18 MG/DL    Creatinine 4.18 (H) 0.6 - 1.3 MG/DL    BUN/Creatinine ratio 6 (L) 12 - 20      GFR est AA 13 (L) >60 ml/min/1.73m2    GFR est non-AA 11 (L) >60 ml/min/1.73m2    Calcium 8.1 (L) 8.5 - 10.1 MG/DL   CBC WITH AUTOMATED DIFF    Collection Time: 10/25/17  2:41 AM   Result Value Ref Range    WBC 10.4 4.6 - 13.2 K/uL    RBC 2.78 (L) 4.20 - 5.30 M/uL    HGB 9.3 (L) 12.0 - 16.0 g/dL    HCT 27.3 (L) 35.0 - 45.0 %    MCV 98.2 (H) 74.0 - 97.0 FL    MCH 33.5 24.0 - 34.0 PG    MCHC 34.1 31.0 - 37.0 g/dL    RDW 14.2 11.6 - 14.5 %    PLATELET 976 510 - 041 K/uL    MPV 10.6 9.2 - 11.8 FL    NEUTROPHILS 84 (H) 42 - 75 %    BAND NEUTROPHILS 5 0 - 5 %    LYMPHOCYTES 2 (L) 20 - 51 %    MONOCYTES 7 2 - 9 %    EOSINOPHILS 2 0 - 5 %    BASOPHILS 0 0 - 3 %    ABS. NEUTROPHILS 9.3 (H) 1.8 - 8.0 K/UL    ABS. LYMPHOCYTES 0.2 (L) 0.8 - 3.5 K/UL    ABS. MONOCYTES 0.7 0 - 1.0 K/UL    ABS. EOSINOPHILS 0.2 0.0 - 0.4 K/UL    ABS.  BASOPHILS 0.0 0.0 - 0.06 K/UL    DF MANUAL      PLATELET COMMENTS ADEQUATE PLATELETS      RBC COMMENTS ANISOCYTOSIS  1+        RBC COMMENTS POLYCHROMASIA  1+        RBC COMMENTS OVALOCYTES  1+       PHOSPHORUS    Collection Time: 10/25/17  2:41 AM   Result Value Ref Range    Phosphorus 1.6 (L) 2.5 - 4.9 MG/DL   POC G3    Collection Time: 10/25/17  5:07 AM   Result Value Ref Range    Device: VENT      FIO2 (POC) 35 %    pH (POC) 7.411 7.35 - 7.45      pCO2 (POC) 34.2 (L) 35.0 - 45.0 MMHG    pO2 (POC) 120 (H) 80 - 100 MMHG    HCO3 (POC) 21.7 (L) 22 - 26 MMOL/L    sO2 (POC) 99 (H) 92 - 97 %    Base deficit (POC) 3 mmol/L    Mode ASSIST CONTROL      Tidal volume 375 ml    Set Rate 20 bpm    PEEP/CPAP (POC) 5 cmH2O    Allens test (POC) YES      Inspiratory Time 0.9 sec    Total resp. rate 20      Site RIGHT RADIAL      Patient temp.  98.6      Specimen type (POC) ARTERIAL      Performed by Ana Luisa Butler     Volume control plus YES     GLUCOSE, POC    Collection Time: 10/25/17  6:43 AM   Result Value Ref Range    Glucose (POC) 93 70 - 110 mg/dL   GLUCOSE, POC    Collection Time: 10/25/17 11:12 AM   Result Value Ref Range    Glucose (POC) 102 70 - 110 mg/dL   EKG, 12 LEAD, SUBSEQUENT    Collection Time: 10/25/17  3:34 PM   Result Value Ref Range    Ventricular Rate 76 BPM    Atrial Rate 76 BPM    P-R Interval 82 ms    QRS Duration 162 ms    Q-T Interval 480 ms    QTC Calculation (Bezet) 540 ms    Calculated R Axis -86 degrees    Calculated T Axis 94 degrees    Diagnosis       Sinus rhythm with short AR with frequent premature ventricular complexes in a   pattern of bigeminy  Left axis deviation  Nonspecific intraventricular block  Abnormal ECG  When compared with ECG of 21-OCT-2017 16:42,  Significant changes have occurred       Signed By: Mane Torres NP     October 25, 2017 4:21 PM

## 2017-10-25 NOTE — PROCEDURES
[facility]  *** FINAL REPORT ***    Name: Saturnino Jacome  MRN: [id number]    Inpatient  : 30 Mar 1950  HIS Order #: [id number]  [address] [facility] Visit #: [id number]  Date: 25 Oct 2017    TYPE OF TEST: Peripheral Venous Testing    REASON FOR TEST  Pain in limb, Limb swelling    Right Arm:-  Deep venous thrombosis:           No  Superficial venous thrombosis:    Not examined      INTERPRETATION/FINDINGS  Duplex images were obtained using 2-D gray scale, color flow, and  spectral Doppler analysis. Right arm :  1. No evidence of deep venous thrombosis detected in the veins  visualized. 2. Deep vein(s) visualized include the internal jugular, subclavian,  axillary and brachial veins. 3. Unable to visualize superficial veins due limited ability to  position arm properly and body habitus. 4. No evidence of deep vein thrombosis in the contralateral internal  jugular and subclavian veins. ADDITIONAL COMMENTS  Suboptimal imaging due to body habitus. I have personally reviewed the data relevant to the interpretation of  this  study. TECHNOLOGIST: Janell DIAZT  Signed: 10/25/2017 04:47 PM    PHYSICIAN: Keke Hahn MD  Signed: 10/25/2017 08:41 PM

## 2017-10-25 NOTE — ROUTINE PROCESS
0730 Bedside and Verbal shift change report given to Nitesh (oncoming nurse) by FARIDEH Allison (offgoing nurse). Report included the following information SBAR, Kardex, MAR and Recent Results.

## 2017-10-25 NOTE — PROGRESS NOTES
SW introduced to self to daughter at bedside. Patient's daughter directed this SW to her sister, Sher Reno [phone], who is considered the spokesperson for the family. Family member at bedside did not want to answer any questions. Patient is intubated but responsive.

## 2017-10-25 NOTE — PROGRESS NOTES
[facility] - Progress Note  Admit Date: 10/21/2017    Assessment:     -Acute hypercarbic respiratory failure with respiratory acidosis, intubated  -NSVT in setting of respiratory failure, PTX, pleural effusion  -Hypertrophic cardiomyopathy on echo 10/23/2017: markedly increased LV wall thickness with dynamic obstruction at rest and mid-cavity obliteration and peak gradient 38 mmHg.  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  Pt with documented Mobitz type II, second-degree AV block and 2:1 AV block with near syncope. -S/p PPM placement - [employer]pta [id number]  -Large left pleural effusion, cardiothoracic surgery following  -PTX  -Hx HTN  -ESRD, on HD T/R/S   -Anemia of chronic disease  -Secondary hyperparathyroidism of CKD  -Hx upper extremity DVT  -Quadriplegic, bed bound   -History of CVA with right sided weakness   -HCVD with severe LVH and normal EF 65% 2011, hyperdynamic LF function 75-80% on echo this admission   -Hx sleep apnea, but apparently improved with wt loss     Primary cardiologist is Dr. Gilda Delaney:     Isis Soto to proceed to OR for lung decortication from cardiac standpoint. Will follow-up post-op. Subjective:     Remains intubated, ventricular bigeminy.     Objective:      Patient Vitals for the past 8 hrs:   Temp Pulse Resp BP SpO2   10/25/17 1110 98.9 °F (37.2 °C) - - - -   10/25/17 1000 - 73 21 (!) 110/37 100 %   10/25/17 0925 - 66 20 - 100 %   10/25/17 0900 - 81 20 112/48 100 %   10/25/17 0800 - 76 20 (!) 105/36 100 %   10/25/17 0733 99 °F (37.2 °C) - - - -   10/25/17 0700 - 66 21 138/44 100 %   10/25/17 0630 - 76 20 111/41 100 %   10/25/17 0600 - 79 12 125/41 100 %   10/25/17 0530 - 80 18 (!) 110/38 100 %   10/25/17 0500 - 72 16 122/40 -   10/25/17 0430 - 76 20 (!) 106/37 100 %         Patient Vitals for the past 96 hrs:   Weight   10/25/17 0000 82 kg (180 lb 12.4 oz)   10/24/17 0601 82 kg (180 lb 12.4 oz)   10/23/17 0600 77.2 kg (170 lb 3.1 oz)   10/23/17 0400 77.2 kg (170 lb 3.1 oz)   10/21/17 2235 91.6 kg (202 lb)                    Intake/Output Summary (Last 24 hours) at 10/25/17 1201  Last data filed at 10/25/17 1100   Gross per 24 hour   Intake          1519.31 ml   Output              795 ml   Net           724.31 ml       Physical Exam:  General:  Intubated, sedated  Neck:  nontender  Lungs:  decreased  Heart:  regular rate and rhythm, S1, S2 normal, no murmur, click, rub or gallop  Abdomen:  abdomen is soft without significant tenderness, masses, organomegaly or guarding  Extremities:  extremities normal, atraumatic, no cyanosis or edema    Data Review:     Labs: Results:       Chemistry Recent Labs      10/25/17   0241  10/24/17   0340  10/23/17   0150   GLU  107*  117*  76   NA  136  137  136   K  3.6  2.7*  3.6   CL  107  107  107   CO2  20*  18*  16*   BUN  26*  43*  30*   CREA  4.18*  6.06*  4.71*   CA  8.1*  8.2*  8.4*   MG   --   2.1   --    PHOS  1.6*  2.4*   --    AGAP  9  12  13   BUCR  6*  7*  6*   AP   --    --   178*   TP   --    --   6.6   ALB   --    --   2.7*   GLOB   --    --   3.9   AGRAT   --    --   0.7*      CBC w/Diff Recent Labs      10/25/17   0241  10/24/17   0340  10/23/17   0150   WBC  10.4  9.1  8.2   RBC  2.78*  2.82*  3.11*   HGB  9.3*  9.3*  10.5*   HCT  27.3*  28.2*  32.3*   PLT  145  151  131*   GRANS  84*  85*  86*   LYMPH  2*  6*  6*   EOS  2  1  1      Cardiac Enzymes No results found for: CPK, CK, CKMMB, CKMB, RCK3, CKMBT, CKNDX, CKND1, BONI, TROPT, TROIQ, SREEDHAR, TROPT, TNIPOC, BNP, BNPP   Coagulation No results for input(s): PTP, INR, APTT in the last 72 hours.     No lab exists for component: INREXT    Lipid Panel Lab Results   Component Value Date/Time    Cholesterol, total 110 05/16/2011 09:50 AM    HDL Cholesterol 34 05/16/2011 09:50 AM    LDL, calculated 43.6 05/16/2011 09:50 AM    VLDL, calculated 32.4 05/16/2011 09:50 AM    Triglyceride 162 05/16/2011 09:50 AM    CHOL/HDL Ratio 3.2 05/16/2011 09:50 AM      BNP No results found for: BNP, BNPP, XBNPT   Liver Enzymes Recent Labs      10/23/17   0150   TP  6.6   ALB  2.7*   AP  178*   SGOT  16      Digoxin    Thyroid Studies Lab Results   Component Value Date/Time    TSH 0.89 09/03/2014 07:40 AM          Signed By: Vic Molina MD     October 25, 2017

## 2017-10-25 NOTE — PROGRESS NOTES
Problem: Falls - Risk of  Goal: *Absence of Falls  Document Richard Fall Risk and appropriate interventions in the flowsheet. Outcome: Progressing Towards Goal  Fall Risk Interventions:       Mentation Interventions: Door open when patient unattended, Evaluate medications/consider consulting pharmacy, More frequent rounding    Medication Interventions: Evaluate medications/consider consulting pharmacy    Elimination Interventions:  Toileting schedule/hourly rounds    History of Falls Interventions: Evaluate medications/consider consulting pharmacy

## 2017-10-25 NOTE — PROGRESS NOTES
[facility]  ICU Progress Note      Name: Shayan Hinojosa   : 1950   MRN: [id number]   Date: 10/25/2017 9:00 AM     [x]I have reviewed the flowsheet and previous days notes. Events overnight reviewed and discussed with nursing staff. Vital signs and records reviewed. Subjective:  74 yo female w/ ESRD on HD w/ worsening dyspnea x 1 month, hypercarbic respiratory failure, intubated. Large left pleural effusion s/p thoracentesis and CT placement, with persistent L sided PTX. Dialyzed yesterday. Had dry CT chest last night to further evaluate L sided pneumothorax. No new events overnight. No current complaints. [x]The patient is unable to give any meaningful history or review of systems because the patient is:  [x]Intubated []Sedated   []Unresponsive      [x]The patient is critically ill on      [x]Mechanical ventilation [x]Pressors   []BiPAP []                 ROS:Review of systems not obtained due to patient factors.     Medication Review:  · Pressors - levophed gtt  · Sedation - versed prn  · Antibiotics - cefepime, vanc  · Pain - fentanyl gtt  · GI/ DVT - pepcid, heparin  · Others (other gtts)    Safety Bundles: Electrolyte Replacement Protocol    Vital Signs:    Visit Vitals    /44    Pulse 66    Temp 99 °F (37.2 °C)    Resp 21    Ht 5' 5\" (1.651 m)    Wt 82 kg (180 lb 12.4 oz)    SpO2 100%    BMI 30.08 kg/m2       O2 Device: Ventilator, Endotracheal tube   O2 Flow Rate (L/min): 3 l/min   Temp (24hrs), Av.3 °F (36.8 °C), Min:97.5 °F (36.4 °C), Max:99 °F (37.2 °C)       Intake/Output:   Last shift:         Last 3 shifts: 10/23 1901 - 10/25 07  In: 2209.4 [I.V.:468.4]  Out: 975     Intake/Output Summary (Last 24 hours) at 10/25/17 0900  Last data filed at 10/25/17 0700   Gross per 24 hour   Intake          1574.31 ml   Output              855 ml   Net           719.31 ml   CT output- 310      Ventilator Settings:  Ventilator  Mode: Assist control, VC+  Respiratory Rate  Back-Up Rate: 20  Insp Time (sec): 0.9 sec  I:E Ratio: 1:2  Ventilator Volumes  Vt Set (ml): 375 ml  Vt Exhaled (Machine Breath) (ml): 427 ml  Vt Spont (ml): 1114 ml  Ve Observed (l/min): 8.29 l/min  Ventilator Pressures  PIP Observed (cm H2O): 24 cm H2O  Plateau Pressure (cm H2O): 21 cm H2O  MAP (cm H2O): 11  PEEP/VENT (cm H2O): 5 cm H20  Auto PEEP Observed (cm H2O): 0 cm H2O    Physical Exam:    General: Intubated, awake, alert   HEENT:  Anicteric sclerae; pink palpebral conjunctivae; mucosa moist  Resp:  Symmetrical chest expansion, no accessory muscle use; good airway entry; equal breath sounds BL, no rales/ wheezing/ rhonchi noted  CV:  S1, S2 present; paced rhythm, regular rate  GI:  Abdomen soft, non-tender; (+) active bowel sounds  Extremities:  no edema/ cyanosis/ clubbing noted  Skin:  Warm; no rashes/ lesions noted  Neurologic:  Non-focal, follows directions  Devices:  Dialysis cath R femoral, ETT, OGT, L chest tube      DATA:     Current Facility-Administered Medications   Medication Dose Route Frequency    [START ON 10/26/2017] Vancomycin random level  1 Each Other Rx Dosing/Monitoring    NOREPINephrine (LEVOPHED) 16,000 mcg in dextrose 5% 250 mL infusion  2-16 mcg/min IntraVENous TITRATE    chlorhexidine (PERIDEX) 0.12 % mouthwash 10 mL  10 mL Oral BID    glucose chewable tablet 16 g  4 Tab Oral PRN    glucagon (GLUCAGEN) injection 1 mg  1 mg IntraMUSCular PRN    dextrose (D50W) injection syrg 12.5-25 g  25-50 mL IntraVENous PRN    midazolam (VERSED) injection 1-2 mg  1-2 mg IntraVENous Q4H PRN    cefepime (MAXIPIME) 500 g in 0.9% sodium chloride 100 mL IVPB  500 g IntraVENous Q24H    famotidine (PF) (PEPCID) 20 mg in sodium chloride 0.9 % 10 mL injection  20 mg IntraVENous Q24H    0.9% sodium chloride infusion  100 mL/hr IntraVENous DIALYSIS PRN    doxercalciferol (HECTOROL) 4 mcg/2 mL injection 6 mcg  6 mcg IntraVENous DIALYSIS TUE, THU & SAT    alteplase (CATHFLO) 2 mg in sterile water (preservative free) 2 mL injection  2 mg InterCATHeter ONCE PRN    heparin (porcine) 1,000 unit/mL injection 1,000 Units  1,000 Units InterCATHeter DIALYSIS PRN    epoetin windy (EPOGEN;PROCRIT) injection 1,000 Units  1,000 Units IntraVENous DIALYSIS TUE, THU & SAT    VANCOMYCIN INFORMATION NOTE   Other Rx Dosing/Monitoring    fentaNYL (PF) 10 mcg/mL infusion  0-200 mcg/hr IntraVENous TITRATE    diphenhydrAMINE (BENADRYL) injection 12.5 mg  12.5 mg IntraVENous Q4H PRN    ondansetron (ZOFRAN) injection 4 mg  4 mg IntraVENous Q4H PRN    acetaminophen (TYLENOL) suppository 650 mg  650 mg Rectal Q4H PRN    heparin (porcine) injection 5,000 Units  5,000 Units SubCUTAneous Q12H    albuterol-ipratropium (DUO-NEB) 2.5 MG-0.5 MG/3 ML  3 mL Nebulization Q6H RT         Labs: Results:       Chemistry Recent Labs      10/25/17   0241  10/24/17   0340  10/23/17   0150   GLU  107*  117*  76   NA  136  137  136   K  3.6  2.7*  3.6   CL  107  107  107   CO2  20*  18*  16*   BUN  26*  43*  30*   CREA  4.18*  6.06*  4.71*   CA  8.1*  8.2*  8.4*   AGAP  9  12  13   BUCR  6*  7*  6*   AP   --    --   178*   TP   --    --   6.6   ALB   --    --   2.7*   GLOB   --    --   3.9   AGRAT   --    --   0.7*      CBC w/Diff Recent Labs      10/25/17   0241  10/24/17   0340  10/23/17   0150   WBC  10.4  9.1  8.2   RBC  2.78*  2.82*  3.11*   HGB  9.3*  9.3*  10.5*   HCT  27.3*  28.2*  32.3*   PLT  145  151  131*   GRANS  84*  85*  86*   LYMPH  2*  6*  6*   EOS  2  1  1      Coagulation No results for input(s): PTP, INR, APTT in the last 72 hours.     No lab exists for component: INREXT    Liver Enzymes Recent Labs      10/23/17   0150   TP  6.6   ALB  2.7*   AP  178*   SGOT  16      ABG Lab Results   Component Value Date/Time    PHI 7.411 10/25/2017 05:07 AM    PCO2I 34.2 (L) 10/25/2017 05:07 AM    PO2I 120 (H) 10/25/2017 05:07 AM    HCO3I 21.7 (L) 10/25/2017 05:07 AM    FIO2I 35 10/25/2017 05:07 AM      Microbiology Recent Labs      10/24/17   1130  10/22/17   1900   CULT  PENDING  NO GROWTH 2 DAYS  FEW NORMAL RESPIRATORY CARLOS ALBERTO  PENDING      Pleural fluid 10/24:  LDH- 265  Protein 4  Glucose 78      Telemetry: []Sinus []A-flutter [x]Paced    []A-fib []Multiple PVCs                    Imaging:  CXR 10/25:  Left pneumothorax is significantly increased  Mild to moderate hypoventilation of right lung with minimal pulmonary vascular  congestion without infiltrates. The ET tube is 5.1 cm above arvin. CT Chest w/o contrast  L pneumothorax, ?mass/infiltrate in L lung, chest tube within L chest  Will follow up with formal radiology interpretation      IMPRESSION:   76 yo female with hx of ESRD on HD w/ worsening dyspnea x 1 month with progression to hypercarbic resp failure, now intubated. Large L pleural effusion s/p thoracentesis and chest tube placement now with persistent L sided PTX. · Acute respitatory failure- was difficult intubation, remains intubated at this time,  Respiratory acidosis improved. FiO2 35%, , RR 20, PEEP 5  · L pleural effusion, PTX- chest tube to suction w/ intermittent air leak, Resp cx negative, prelim blood cx neg, redrawn pleural fluid yesterday w/ LDH of 265- Light's criteria c/w exudative effusion. No leukocytosis. CT surgery following. · Hypokalemia- resolved  · ESRD on HD- dialyzed yesterday  · H/o 2nd deg block s/p pacemaker placement w/ NSVT with intermittent pacing and ventricular bigeminy- cardiology following. Pacemaker interrogated- no prolonged arrhythmias. ECHO c/w hypertrophic cardiomyopathy          PLAN:   · Resp -  Remain intubated w/ mech vent. Continue chest tube to suction. pulm hygiene, duo-nebs. FiO2 30%. PEEP 5. , Follow up on chest CT and CT surgery recs for possible VATS.    · ID - follow up cx's, continue empiric cefepime, vanc  · CVS - continue levophed  · Heme/onc -  Continue EPO, trend H/H  · Metabolic - replete phos, monitor with daily BMPs  · Renal - continue HD Tue, Thur, Sat  · Endocrine - continue to monitor  · Neuro/ Pain/ Sedation - continue Fentanyl  · GI - tube feeds with Nepro at 45ml/hr  · Prophylaxis - DVT, GI  · Discussed in interdisciplinary rounds          The patient is: [] acutely ill Risk of deterioration: [] moderate    [x] critically ill  [x] high     My assessment/plan was discussed with:  [x]nursing []PT/OT    [x]respiratory therapy [x]Dr. Samy Mcallister   []family []     [x]Total critical care time exclusive of procedures  30   minutes    Meme Bucio MD

## 2017-10-26 NOTE — PROGRESS NOTES
[facility]    Attempted RIJ line placement done. Risks, benefits, alternatives explained and consent obtained. Time out performed. Patient positioned in Trendelenburg. Full sterile barrier precautions used. 7-Step Sterility Protocol followed. (cap, mask sterile gown, sterile gloves, large sterile sheet, hand hygiene, 2% chlorhexidine for cutaneous antisepsis)  5 mL 1% Lidocaine placed at insertion site. Using ultrasound guidance, Right internal jugular vein cannulated x 2 attempt(s) utilizing the modified Seldinger technique. No difficulty with obtaining venous blood return however unable to thread guidewire. Procedure aborted. CXR obtained. CXR - no pneumothorax    Right EJ placed.       Coleen Khalil PA-C    2:38 PM

## 2017-10-26 NOTE — ROUTINE PROCESS
After the ej catheter was put in the levophed was placed to this line, the red port of the dialysis catheter was flushed with 10 cc of ns and then replaced with heparin 1000 units per cc, 2.3 ml according to the notation on the catheter and a deadender was placed on the end of the catheter dialysis was notified that catheter is now free but they state dialysis won't be until 1800

## 2017-10-26 NOTE — PROGRESS NOTES
[facility] Hospitalist Group  Progress Note    Patient: America Orellana Age: 75 y.o. : 1950 MR#: [id number] SSN: [id number]  Date: 10/26/2017     Subjective: offers no complaints other than wanting to get ET tube out. Assessment/Plan:   1. Acute hypercarbic respiratory failure with respiratory acidosis -    Persistent left sided pneumonthorax, noted plans per CTS for thoracotomy and decortication once BP more stable / off pressors and plan to biospy intraoperatively. Cont chest tube to suction per CTS. Continue vent management per PCCM. 2.  ESRD on HD - nephrology following. Plan for dialysis 10/26. Normal dialysis days Tues-Thurs - Sat per patient. 3.  Ventricular Tachycardia - in setting of acute illness; s/p PPM in   4. Anemia of chronic disease - cont procrit, stable, follow labs  5. Hypotension - Pressor resumed earlier today, follow. Add low dose metoprolol as BP allows. 6.  Mild hyponatremia - resolved  7. Tremor - new onset since admission, in setting of acute illness; follow    Case discussed with:  [x]Patient  [x]Family  [x]Nursing  []Case Management  DVT Prophylaxis:  []Lovenox  [x]Hep SQ  []SCDs  []Coumadin   []On Heparin gtt    Objective:   VS:   Visit Vitals    BP (!) 86/38    Pulse 69    Temp 99.7 °F (37.6 °C)    Resp 17    Ht 5' 5\" (1.651 m)    Wt 82.9 kg (182 lb 12.2 oz)    SpO2 100%    BMI 30.41 kg/m2      Tmax/24hrs: Temp (24hrs), Av.7 °F (37.1 °C), Min:97.8 °F (36.6 °C), Max:99.7 °F (37.6 °C)      Intake/Output Summary (Last 24 hours) at 10/26/17 1831  Last data filed at 10/26/17 1000   Gross per 24 hour   Intake              930 ml   Output               50 ml   Net              880 ml     General: awake, intubated, follows commands, appears in no distress  HEENT: non icteric, ET/NGT in place  Neck: No JVD  Cardiovascular: RRR  C/L: bilateral vent breath sounds, no rales/wheezing.   Abdomen: soft, + BS  Ext: Right femoral HD cath, no LE edema     Labs:    Recent Results (from the past 24 hour(s))   GLUCOSE, POC    Collection Time: 10/25/17 11:05 PM   Result Value Ref Range    Glucose (POC) 92 70 - 110 mg/dL   VANCOMYCIN, RANDOM    Collection Time: 10/26/17  2:00 AM   Result Value Ref Range    Vancomycin, random 23.5 5.0 - 74.9 UG/ML   METABOLIC PANEL, BASIC    Collection Time: 10/26/17  2:00 AM   Result Value Ref Range    Sodium 137 136 - 145 mmol/L    Potassium 3.3 (L) 3.5 - 5.5 mmol/L    Chloride 107 100 - 108 mmol/L    CO2 22 21 - 32 mmol/L    Anion gap 8 3.0 - 18 mmol/L    Glucose 75 74 - 99 mg/dL    BUN 40 (H) 7.0 - 18 MG/DL    Creatinine 5.17 (H) 0.6 - 1.3 MG/DL    BUN/Creatinine ratio 8 (L) 12 - 20      GFR est AA 10 (L) >60 ml/min/1.73m2    GFR est non-AA 8 (L) >60 ml/min/1.73m2    Calcium 8.6 8.5 - 10.1 MG/DL   CBC WITH AUTOMATED DIFF    Collection Time: 10/26/17  2:00 AM   Result Value Ref Range    WBC 8.4 4.6 - 13.2 K/uL    RBC 2.70 (L) 4.20 - 5.30 M/uL    HGB 8.9 (L) 12.0 - 16.0 g/dL    HCT 26.8 (L) 35.0 - 45.0 %    MCV 99.3 (H) 74.0 - 97.0 FL    MCH 33.0 24.0 - 34.0 PG    MCHC 33.2 31.0 - 37.0 g/dL    RDW 15.0 (H) 11.6 - 14.5 %    PLATELET 591 814 - 258 K/uL    MPV 10.9 9.2 - 11.8 FL    NEUTROPHILS 76 (H) 40 - 73 %    LYMPHOCYTES 12 (L) 21 - 52 %    MONOCYTES 10 3 - 10 %    EOSINOPHILS 2 0 - 5 %    BASOPHILS 0 0 - 2 %    ABS. NEUTROPHILS 6.4 1.8 - 8.0 K/UL    ABS. LYMPHOCYTES 1.0 0.9 - 3.6 K/UL    ABS. MONOCYTES 0.9 0.05 - 1.2 K/UL    ABS. EOSINOPHILS 0.2 0.0 - 0.4 K/UL    ABS.  BASOPHILS 0.0 0.0 - 0.1 K/UL    DF AUTOMATED     PHOSPHORUS    Collection Time: 10/26/17  2:00 AM   Result Value Ref Range    Phosphorus 2.2 (L) 2.5 - 4.9 MG/DL   POC G3    Collection Time: 10/26/17  4:51 AM   Result Value Ref Range    Device: VENT      FIO2 (POC) 30 %    pH (POC) 7.365 7.35 - 7.45      pCO2 (POC) 37.4 35.0 - 45.0 MMHG    pO2 (POC) 97 80 - 100 MMHG    HCO3 (POC) 21.4 (L) 22 - 26 MMOL/L    sO2 (POC) 97 92 - 97 %    Base deficit (POC) 4 mmol/L    Mode ASSIST CONTROL      Tidal volume 375 ml    Set Rate 15 bpm    PEEP/CPAP (POC) 5 cmH2O    Allens test (POC) YES      Inspiratory Time 1.25 sec    Total resp. rate 18      Site RIGHT RADIAL      Specimen type (POC) ARTERIAL      Performed by Jennifer Neal     Volume control plus YES     GLUCOSE, POC    Collection Time: 10/26/17  5:58 AM   Result Value Ref Range    Glucose (POC) 76 70 - 110 mg/dL   TYPE + CROSSMATCH    Collection Time: 10/26/17  9:25 AM   Result Value Ref Range    Crossmatch Expiration 10/29/2017     ABO/Rh(D) O POSITIVE     Antibody screen NEG     CALLED TO: CCMARICARMEN CRISTOBAL ON 10/26/2017 AT 1108 BY [facility].      Unit number [id number]     Blood component type RC LR AS1     Unit division 00     Status of unit ALLOCATED     Crossmatch result Compatible     Unit number [id number]     Blood component type RC LR AS1     Unit division 00     Status of unit ALLOCATED     Crossmatch result Compatible     Unit number [id number]     Blood component type RC LR AS1     Unit division 00     Status of unit ALLOCATED     Crossmatch result Compatible     Unit number [id number]     Blood component type RC LR AS1     Unit division 00     Status of unit ALLOCATED     Crossmatch result Compatible    GLUCOSE, POC    Collection Time: 10/26/17 11:30 AM   Result Value Ref Range    Glucose (POC) 90 70 - 110 mg/dL   GLUCOSE, POC    Collection Time: 10/26/17  4:55 PM   Result Value Ref Range    Glucose (POC) 85 70 - 110 mg/dL     Signed By: Mary Hirsch NP     October 26, 2017 4:21 PM

## 2017-10-26 NOTE — CDMP QUERY
Please clarify if this patient is being treated/managed for:    =>Distributive shock (POA or after admit)  =>Other shock (spell out) (POA or after admit)  =>Other Explanation of clinical findings  =>Unable to Determine (no explanation of clinical findings)    The medical record reflects the following:  Risk:  --admitted with pl effusion, respiratory failure    Clinical Indicators:  --10/21 BP: 157/64,  134/49,  106/45,  101/46  --10/22 BP:  129/99,  120/56,  97/65,  101/38,  93/37,  82/37,  85/48,  91/52,  88/41,  119/49,  81/46,  41/32,  60/34  --10/23 BP: 123/106,  104/56,  97/55,  113/63,  106/54,  114/54,    --10/24 BP: 100/67,  90/49,  96/52,  87/49,  91/42,  110/49,  75/48,  84/41,  126/52    Treatment:   --receiving IV Levophed gtt    Please clarify and document your clinical opinion in the progress notes and discharge summary including the definitive and/or presumptive diagnosis, (suspected or probable), related to the above clinical findings. Please include clinical findings supporting your diagnosis. If you DECLINE this query or would like to communicate with [facility], please utilize the \"[facility] message box\" at the TOP of the Progress Note on the right.       Thank you,  Kumar Cheney [facility], [address] [place] [address]

## 2017-10-26 NOTE — ROUTINE PROCESS
pcxr done to check for pneumothorax post unsuccessful attempt to insert line right internal jugular,, son notified unable to get cvp line in place pt was given fentanyl twice for pain during the procedure

## 2017-10-26 NOTE — PROGRESS NOTES
RENAL DAILY PROGRESS NOTE    Subjective:   Admitted for SOB seen for ESRD and HD    Complaint: surgery cancelled due to low BP Pt remains on levophed    Current Facility-Administered Medications   Medication Dose Route Frequency    [START ON 10/28/2017] Vancomycin random level  1 Each Other Rx Dosing/Monitoring    heparin (porcine) injection 5,000 Units  5,000 Units SubCUTAneous Q8H    polyethylene glycol (MIRALAX) packet 17 g  17 g Oral DAILY    potassium, sodium phosphates (NEUTRA-PHOS) packet 1 Packet  1 Packet Per G Tube BID    NOREPINephrine (LEVOPHED) 16,000 mcg in dextrose 5% 250 mL infusion  2-16 mcg/min IntraVENous TITRATE    fentaNYL citrate (PF) injection 25-50 mcg  25-50 mcg IntraVENous Q4H PRN    chlorhexidine (PERIDEX) 0.12 % mouthwash 10 mL  10 mL Oral BID    glucose chewable tablet 16 g  4 Tab Oral PRN    glucagon (GLUCAGEN) injection 1 mg  1 mg IntraMUSCular PRN    dextrose (D50W) injection syrg 12.5-25 g  25-50 mL IntraVENous PRN    midazolam (VERSED) injection 1-2 mg  1-2 mg IntraVENous Q4H PRN    cefepime (MAXIPIME) 500 g in 0.9% sodium chloride 100 mL IVPB  500 g IntraVENous Q24H    famotidine (PF) (PEPCID) 20 mg in sodium chloride 0.9 % 10 mL injection  20 mg IntraVENous Q24H    0.9% sodium chloride infusion  100 mL/hr IntraVENous DIALYSIS PRN    doxercalciferol (HECTOROL) 4 mcg/2 mL injection 6 mcg  6 mcg IntraVENous DIALYSIS TUE, THU & SAT    alteplase (CATHFLO) 2 mg in sterile water (preservative free) 2 mL injection  2 mg InterCATHeter ONCE PRN    heparin (porcine) 1,000 unit/mL injection 1,000 Units  1,000 Units InterCATHeter DIALYSIS PRN    epoetin windy (EPOGEN;PROCRIT) injection 1,000 Units  1,000 Units IntraVENous DIALYSIS TUE, THU & SAT    VANCOMYCIN INFORMATION NOTE   Other Rx Dosing/Monitoring    diphenhydrAMINE (BENADRYL) injection 12.5 mg  12.5 mg IntraVENous Q4H PRN    ondansetron (ZOFRAN) injection 4 mg  4 mg IntraVENous Q4H PRN    acetaminophen (TYLENOL) suppository 650 mg  650 mg Rectal Q4H PRN    albuterol-ipratropium (DUO-NEB) 2.5 MG-0.5 MG/3 ML  3 mL Nebulization Q6H RT           Objective:     Patient Vitals for the past 24 hrs:   Temp Pulse Resp BP SpO2   10/26/17 1345 - 75 (!) 5 95/43 100 %   10/26/17 1330 - 83 20 107/70 100 %   10/26/17 1315 - 62 15 138/67 100 %   10/26/17 1300 - 78 22 120/40 100 %   10/26/17 1245 - 76 16 104/52 100 %   10/26/17 1230 - 88 16 121/41 100 %   10/26/17 1215 - (!) 122 25 152/86 100 %   10/26/17 1200 - 87 21 125/86 100 %   10/26/17 1145 - 72 12 98/46 100 %   10/26/17 1130 - 75 21 113/46 100 %   10/26/17 1128 98.6 °F (37 °C) - - - -   10/26/17 1124 - 71 22 - 100 %   10/26/17 1115 - 76 21 121/57 100 %   10/26/17 1100 - 86 20 108/72 100 %   10/26/17 1045 - 73 18 105/41 100 %   10/26/17 1030 - 81 22 123/46 100 %   10/26/17 1015 - 75 23 107/52 100 %   10/26/17 1000 - 70 15 102/45 100 %   10/26/17 0945 - 72 16 - 100 %   10/26/17 0930 - 81 18 - 100 %   10/26/17 0900 - 70 17 107/54 -   10/26/17 0800 - 65 16 (!) 73/40 100 %   10/26/17 0745 - 68 23 - 100 %   10/26/17 0741 - 65 19 - 100 %   10/26/17 0730 - 63 17 - -   10/26/17 0715 98.8 °F (37.1 °C) 61 15 - 100 %   10/26/17 0700 - 79 22 94/54 -   10/26/17 0600 - 61 15 (!) 82/53 100 %   10/26/17 0500 - 69 18 97/54 100 %   10/26/17 0437 - 71 16 - 100 %   10/26/17 0400 - 81 22 (!) 74/58 92 %   10/26/17 0300 - 69 15 (!) 83/45 100 %   10/26/17 0200 - 75 21 114/55 100 %   10/26/17 0101 - 70 15 - 100 %   10/26/17 0100 - 66 15 106/44 100 %   10/26/17 0000 - 68 15 97/49 100 %   10/25/17 2322 98.5 °F (36.9 °C) - - - -   10/25/17 2300 - 69 18 96/52 100 %   10/25/17 2200 - 89 19 127/54 -   10/25/17 2130 - 66 15 (!) 116/100 100 %   10/25/17 2100 - 72 18 117/60 100 %   10/25/17 2045 - (!) 107 22 - 100 %   10/25/17 2030 - 78 20 104/54 100 %   10/25/17 2000 97.8 °F (36.6 °C) 77 23 96/47 100 %   10/25/17 1930 - 63 17 109/45 100 %   10/25/17 1900 - 74 17 98/50 100 %   10/25/17 1800 - 72 20 94/49 100 %   10/25/17 1700 - 73 18 127/51 100 %   10/25/17 1650 - 81 24 - 100 %   10/25/17 1624 99.1 °F (37.3 °C) - - - -   10/25/17 1600 - 76 25 94/49 100 %   10/25/17 1500 - 80 26 122/64 -        Weight change: 0.9 kg (1 lb 15.7 oz)     10/24 1901 - 10/26 0700  In: 2549.3 [I.V.:183.3]  Out: 845     Intake/Output Summary (Last 24 hours) at 10/26/17 1456  Last data filed at 10/26/17 1000   Gross per 24 hour   Intake             1210 ml   Output               50 ml   Net             1160 ml     Physical Exam: awake, intubated, appears in no distress    HEENT: non icteric, ET/NGT in place  Neck: No JVD  Cardiovascular: regular with premature beats, no rub  C/L: bilateral vent breath sounds, no rales/wheezing,  left CT to drain  Abdomen: soft + BS  Ext: Right femoral HD cath, no LE edema    Data Review:     LABS:   Hematology:   Recent Labs      10/26/17   0200  10/25/17   0241  10/24/17   0340   WBC  8.4  10.4  9.1   HGB  8.9*  9.3*  9.3*   HCT  26.8*  27.3*  28.2*   Pl 145K  Chemistry:   Recent Labs      10/26/17   0200  10/25/17   0241  10/24/17   0340   BUN  40*  26*  43*   CREA  5.17*  4.18*  6.06*   CA  8.6  8.1*  8.2*   K  3.3*  3.6  2.7*   NA  137  136  137   CL  107  107  107   CO2  22  20*  18*   PHOS  2.2*  1.6*  2.4*   GLU  75  107*  117*   Vanco random 23.5  Mag 2.1  Blood c/s neg so far  CT chest pend results    IMPRESSION AND PLAN:   ESRD HD later today   Hypokalemia replace per NGT  Low Phos replace per NGT  Hypotension back on vasopressor   Anemia cont Epo with HD, Will benefit from BT during HD sawyer with planned Sx in am. Arrange 1 unit PRBC during HD later today.  Inc Epo with HD  2nd HPTH cont with hectorol with HD  Bilateral pleural effusion, management per pulmo, on empiric antibiotics, trend vanco trough  Adjust per pharmacy protocol, management pre cardiothoracic         Bebeto Leahy MD  10/26/2017

## 2017-10-26 NOTE — CDMP QUERY
\"Possible pneumonia on empiric vanco\" documented by pulmonary on 10/24. Please clarify this diagnosis:    =>Possible pneumonia POA  =>Possible pneumonia occurred after admit  =>Other Explanation of clinical findings  =>Unable to Determine (no explanation of clinical findings)    The medical record reflects the following:  Risk:  --admitted with respiratory failure, pl effussion    Clinical Indicators:  --10/21 CXR:  IMPRESSION:  1. Large left pleural effusion which is potentially loculated. 2.  Mild interstitial pulmonary edema    --10/22 CXR:  FINDINGS: Large left pleural effusion and probable small right pleural effusion. Hazy opacities throughout the left lung and in the lower right lung  IMPRESSION: No significant change from prior, as above    Treatment:   --intubated on vent support  --receiving IV Vanc    Please clarify and document your clinical opinion in the progress notes and discharge summary including the definitive and/or presumptive diagnosis, (suspected or probable), related to the above clinical findings. Please include clinical findings supporting your diagnosis. If you DECLINE this query or would like to communicate with [facility], please utilize the \"[facility] message box\" at the TOP of the Progress Note on the right.       Thank you,  Brynn Jimenez [address]

## 2017-10-26 NOTE — ROUTINE PROCESS
Assumed care of patient. Awake and no acute resp distress noted. Vent settings verified. Daughter and niece  Here to visit. Appears to be in good spirit,smiling and communicating via alphabet letters. HOB up to 30 degrees.

## 2017-10-26 NOTE — ROUTINE PROCESS
Drooling continuously today, suctioned prn for same, drip pad changed frequently by writer and daughter, seen by ELLIOT Walsh this evening changed to neosynephrine drip for bp and this appears to be working well

## 2017-10-26 NOTE — PROGRESS NOTES
[facility]  ICU Progress Note      Name: Brook Fleischer   : 1950   MRN: [id number]   Date: 10/26/2017      [x]I have reviewed the flowsheet and previous days notes. Events overnight reviewed and discussed with nursing staff. Vital signs and records reviewed. Subjective:  76 yo female w/ ESRD on HD w/ worsening dyspnea x 1 month, hypercarbic respiratory failure, intubated. Large left pleural effusion s/p thoracentesis and CT placement, with persistent L sided PTX.     10/26/17  Restarted on levophed early this morning for labile BP, otherwise remained afebrile. Pt failed attempted SBT this morning. [x]The patient is unable to give any meaningful history or review of systems because the patient is:  [x]Intubated []Sedated   []Unresponsive      [x]The patient is critically ill on      [x]Mechanical ventilation [x]Pressors   []BiPAP []                 ROS:Review of systems not obtained due to patient factors.     Medication Review:  · Pressors - levophed gtt  · Sedation - versed prn  · Antibiotics - cefepime, vanc  · Pain - fentanyl gtt  · GI/ DVT - pepcid, heparin  · Others (other gtts)      Vital Signs:    Visit Vitals    /53    Pulse 69    Temp 99.7 °F (37.6 °C)    Resp 20    Ht 5' 5\" (1.651 m)    Wt 82.9 kg (182 lb 12.2 oz)    SpO2 99%    BMI 30.41 kg/m2       O2 Device: Endotracheal tube   O2 Flow Rate (L/min): 3 l/min   Temp (24hrs), Av.8 °F (37.1 °C), Min:97.8 °F (36.6 °C), Max:99.7 °F (37.6 °C)       Intake/Output:   Last shift:      10/26 0701 - 10/26 1900  In: 100   Out: 0   Last 3 shifts: 10/24 1901 - 10/26 0700  In: 2549.3 [I.V.:183.3]  Out: 845     Intake/Output Summary (Last 24 hours) at 10/26/17 1620  Last data filed at 10/26/17 1000   Gross per 24 hour   Intake             1120 ml   Output               50 ml   Net             1070 ml   CT output- 310      Ventilator Settings:  Ventilator  Mode: Assist control, Volume control  Respiratory Rate  Back-Up Rate: 15  Insp Time (sec): 1 sec  I:E Ratio: 1:2  Ventilator Volumes  Vt Set (ml): 375 ml  Vt Exhaled (Machine Breath) (ml): 400 ml  Vt Spont (ml): 1114 ml  Ve Observed (l/min): 7.69 l/min  Ventilator Pressures  PIP Observed (cm H2O): 19 cm H2O  Plateau Pressure (cm H2O): 20 cm H2O  MAP (cm H2O): 10  PEEP/VENT (cm H2O): 5 cm H20  Auto PEEP Observed (cm H2O): 0 cm H2O      Physical Exam:    General: Intubated, awake, alert   HEENT:  Anicteric sclerae; pink palpebral conjunctivae; oral mucosa moist; no lymphadenopathy  Resp:  Symmetrical chest expansion, no accessory muscle use; good airway entry; equal breath sounds BL, no rales/ wheezing/ rhonchi noted  CV:  Paced rhythm  GI:  Abdomen soft, non-tender; (+) active bowel sounds  Extremities:  no edema/ cyanosis/ clubbing noted  Skin:  Warm; no rashes/ lesions noted  Neurologic:  Non-focal, follows directions  Devices:  Dialysis cath R femoral, ETT, OGT, L chest tube      DATA:     Current Facility-Administered Medications   Medication Dose Route Frequency    [START ON 10/28/2017] Vancomycin random level  1 Each Other Rx Dosing/Monitoring    heparin (porcine) injection 5,000 Units  5,000 Units SubCUTAneous Q8H    polyethylene glycol (MIRALAX) packet 17 g  17 g Oral DAILY    0.9% sodium chloride infusion 250 mL  250 mL IntraVENous PRN    epoetin windy (EPOGEN;PROCRIT) injection 2,000 Units  2,000 Units IntraVENous Q TUE, THU & SAT    potassium, sodium phosphates (NEUTRA-PHOS) packet 1 Packet  1 Packet Per G Tube BID    NOREPINephrine (LEVOPHED) 16,000 mcg in dextrose 5% 250 mL infusion  2-16 mcg/min IntraVENous TITRATE    fentaNYL citrate (PF) injection 25-50 mcg  25-50 mcg IntraVENous Q4H PRN    chlorhexidine (PERIDEX) 0.12 % mouthwash 10 mL  10 mL Oral BID    glucose chewable tablet 16 g  4 Tab Oral PRN    glucagon (GLUCAGEN) injection 1 mg  1 mg IntraMUSCular PRN    dextrose (D50W) injection syrg 12.5-25 g  25-50 mL IntraVENous PRN    midazolam (VERSED) injection 1-2 mg  1-2 mg IntraVENous Q4H PRN    cefepime (MAXIPIME) 500 g in 0.9% sodium chloride 100 mL IVPB  500 g IntraVENous Q24H    famotidine (PF) (PEPCID) 20 mg in sodium chloride 0.9 % 10 mL injection  20 mg IntraVENous Q24H    0.9% sodium chloride infusion  100 mL/hr IntraVENous DIALYSIS PRN    doxercalciferol (HECTOROL) 4 mcg/2 mL injection 6 mcg  6 mcg IntraVENous DIALYSIS TUE, THU & SAT    alteplase (CATHFLO) 2 mg in sterile water (preservative free) 2 mL injection  2 mg InterCATHeter ONCE PRN    heparin (porcine) 1,000 unit/mL injection 1,000 Units  1,000 Units InterCATHeter DIALYSIS PRN    VANCOMYCIN INFORMATION NOTE   Other Rx Dosing/Monitoring    diphenhydrAMINE (BENADRYL) injection 12.5 mg  12.5 mg IntraVENous Q4H PRN    ondansetron (ZOFRAN) injection 4 mg  4 mg IntraVENous Q4H PRN    acetaminophen (TYLENOL) suppository 650 mg  650 mg Rectal Q4H PRN    albuterol-ipratropium (DUO-NEB) 2.5 MG-0.5 MG/3 ML  3 mL Nebulization Q6H RT         Labs: Results:       Chemistry Recent Labs      10/26/17   0200  10/25/17   0241  10/24/17   0340   GLU  75  107*  117*   NA  137  136  137   K  3.3*  3.6  2.7*   CL  107  107  107   CO2  22  20*  18*   BUN  40*  26*  43*   CREA  5.17*  4.18*  6.06*   CA  8.6  8.1*  8.2*   AGAP  8  9  12   BUCR  8*  6*  7*      CBC w/Diff Recent Labs      10/26/17   0200  10/25/17   0241  10/24/17   0340   WBC  8.4  10.4  9.1   RBC  2.70*  2.78*  2.82*   HGB  8.9*  9.3*  9.3*   HCT  26.8*  27.3*  28.2*   PLT  162  145  151   GRANS  76*  84*  85*   LYMPH  12*  2*  6*   EOS  2  2  1      Coagulation No results for input(s): PTP, INR, APTT in the last 72 hours. No lab exists for component: INREXT, INREXT    Liver Enzymes No results for input(s): TP, ALB, TBIL, AP, SGOT, GPT in the last 72 hours.     No lab exists for component: DBIL   ABG Lab Results   Component Value Date/Time    PHI 7.365 10/26/2017 04:51 AM    PCO2I 37.4 10/26/2017 04:51 AM    PO2I 97 10/26/2017 04:51 AM    HCO3I 21.4 (L) 10/26/2017 04:51 AM    FIO2I 30 10/26/2017 04:51 AM      Microbiology Recent Labs      10/24/17   1130   CULT  NO GROWTH 2 DAYS      Pleural fluid 10/24:  LDH- 265  Protein 4  Glucose 78      Telemetry: []Sinus []A-flutter [x]Paced    []A-fib []Multiple PVCs                    Imaging:  CXR Results  (Last 48 hours)               10/26/17 1354  XR CHEST PORT Final result    Impression:  Impression:       1. Left-sided pneumothorax. Drainage catheter in place. Persistent rounded   contour of the contracted left lung. No evidence of pneumothorax on the right   side. 2.  Enlargement of the cardiac silhouette. Narrative:  Procedure:  Chest portable. Indication:  Attempted CVP. Checking for pneumothorax. Comparison:  Earlier study at 04:46. Findings:       - ET tube distal tip at 4.37 as above the arvin.   - NG tube distal portion in the stomach. The proximal side-port may be located   at or just below the level of the diaphragmatic hiatus. - Left-sided pigtail drainage catheter in the pleural cavity. The left-sided   pneumothorax appears similar to the previous study. No convincing evidence for   pneumothorax is identified in the right side. The lung aeration pattern is essentially unchanged in the interval.  The   contracted left lung demonstrates rounded contour especially in the left lower   lung zone. The cardiac silhouette is mild to moderately increased. 10/26/17 0605  XR CHEST PORT Final result    Impression:  IMPRESSION:       1. High riding position of the NG tube. Unchanged. Other support devices   appear adequate in position. 2.  Persistent left-sided pneumothorax. Suggestive of trapped lung. 3.  Increased retrocardiac opacity, suggestive of reaccumulation of fluid. Narrative:  PROCEDURE:  Chest Portable       CPT code 17975       INDICATION:  Intubation. Acute respiratory failure.   Status post pleural   catheter placement. COMPARISON:  10/25/17. FINDINGS:         - A pleural catheter is again noted coursing in from the left lower lung zone to   the left upper lung zone in an oblique fashion. The curled end of the pleural   drainage catheter is identified at about the level of the aortic arch. - A cardiac pacemaker hub is identified in the left upper torso region with 2   intact leads through the left subclavian approach. - An endotracheal tube is identified in the midline, with the distal tip at 4.7   cm above the arvin.   - An NG tube has been placed, with the distal tip in the stomach. The proximal   side-port of the NG tube is identified at about the level of the diaphragmatic   hiatus. The NG tube position is unchanged. There is persistent left-sided pneumothorax, with the pleural air collection   largely located in the left lower lung zone costophrenic angle region. Is the   contracted left lung continues to show a curved contour. Increasing opacity is   suggested in the retrocardiac region suggestive of reaccumulation of pleural   fluid. 10/25/17 0614  XR CHEST PORT Final result    Impression:  IMPRESSION:       Left pneumothorax is significantly increased as compared to previous chest x-ray   on 10/20/2017. Mild to moderate hypoventilation of right lung with minimal pulmonary vascular   congestion without infiltrates. The ET tube is 5.1 cm above arvin. Narrative:  Portable chest x-ray, semierect AP view, time 0453 hours on 10/25/2017:               INDICATION:       Left-sided pneumothorax. Left chest tube in position. Patient has been on   ventilation. Follow-up evaluation. COMPARISON STUDY: Chest x-ray on 10/24/2017, 10/23/2017. FINDINGS:       The study again demonstrates pigtail chest tubes in left hemithorax with the   inner end of the chest tube in place. In the left suprahilar area.  There is moderately large left pneumothorax with subtotal collapse of left lung. The   basilar portion of pneumothorax is prominent, measuring 10 cm vertically and 8.6   cm transversely, increased as compared to previous study. Around the site of   left lung the pneumothorax is also increases compared to previous study on   10/20/2017. In subtotal collapsed left lung there are moderate opacities due to   atelectasis is but infiltrates are not excluded. The ET tube is 5.1 cm above arvin. NG tube extends into upper body of stomach. Right lung is mild to moderately hypoventilated and more so as compared to   previous study. There is minimal pulmonary vascular congestion in right lung. No   other focal abnormality in right lung. The cardiac size is borderline or mildly   enlarged. CT Results  (Last 48 hours)               10/25/17 0004  CT CHEST WO CONT Final result    Impression:  Impression:       Persistent moderate to large left pneumothorax with pigtail catheter in place. There is also a small volume of left pleural effusion remaining. Extensive incomplete expansion of the left upper lobe and left lower lobe with   opacities in periphery of both the left upper and left lower lobes. There is   likely a component of volume loss in these opacities with a stellate appearance   of the bronchovascular bundles suggesting predominantly volume loss, possibly   rounded atelectasis or scar. However, underlying mass remains difficult to   entirely exclude particularly in the left upper lobe with a more focal rounded   area measuring 1.8 x 1.3 cm. Continued follow-up is advised. PET/CT could be   utilized for further imaging evaluation. Small to moderate right pleural effusion with associated atelectasis and/or   infiltrates. Prominent precarinal lymph node       Diffuse thickening of the adrenal glands, left greater than right suggesting   adrenal hyperplasia.        Atrophic kidneys and prior cholecystectomy. Reviewed with ICU physician at the time of dictation. Narrative:  CT chest without enhancement        INDICATION: Shortness of breath. Follow-up lung expansion and pneumothorax. Chest tube remains to suction. .       TECHNIQUE: 5 mm collimation axial images obtained from the thoracic inlet to the   level of the diaphragm without administration of low osmolar, nonionic   intravenous contrast.       Dose reduction techniques used: Automated exposure control, adjustment of the   mAs and/or kVp according to patient size, standardized low-dose protocol, and/or   iterative reconstruction technique. COMPARISON: October 21, 2017. CHEST FINDINGS:        Lymph nodes: Prominent precarinal lymph node measures 1.4 cm. No definite new   adenopathy identified. .       Thyroid: Heterogeneous thyroid. Mediastinum: Intubated. NG tube is in place. Heart is moderately enlarged in   size. No significant pericardial effusion. Three-vessel coronary artery disease. Scattered atherosclerosis. Lungs: Moderate to large left pneumothorax with pigtail catheter in place. Persistent small volume of pleural effusion. There are densities in the   periphery of both the left upper and left lower lobes. There is a stellate   configuration of swirling vessels within these which suggests predominantly   volume loss. However, an underlying mass cannot be entirely excluded   particularly in the periphery of the left upper lobe as seen on axial image 26   with a more focally rounded area measuring 8 cm x 1.3 cm. There is a small to   moderate right pleural effusion with extensive associated atelectasis or   infiltrates. No convincing bronchial occlusion is identified. ABDOMEN:        Thickening of the adrenal glands, left greater than right. Atrophic native   kidneys. Prior cholecystectomy. .       Bones: Degenerative changes of the spine.                    IMPRESSION:   76 yo female with hx of ESRD on HD w/ worsening dyspnea x 1 month with progression to hypercarbic resp failure, now intubated. Large L pleural effusion s/p thoracentesis and chest tube placement now with persistent L sided PTX. · Acute hypercapneic respitatory failure, difficult intubation  · L pleural effusion, s/p thoracentesis -pleural fluid exudative by Light's criteria   · Pneumothorax, s/p chest tube insertion, ?trapped lung  · Labile BP needing intermittent vasopressor support; hx of Hypertrophic obstructive cardiomyopathy  · Hypokalemia  · ESRD on HD  · H/o 2nd deg block s/p pacemaker placement w/ NSVT with intermittent pacing and ventricular bigeminy- cardiology following. Pacemaker interrogated- no prolonged arrhythmias. ECHO c/w hypertrophic cardiomyopathy        PLAN:   · Resp - VAP bundle; SBT as appropriate - attempted today however failed. Titrate FiO2 to keep Sp)2 > 90%. Chest tube to suction. CT surgery following - plan for surgery once hemodynamically stable. · ID - follow blood, respiratory and fluid cultures --all so far negative to date. Continue   empiric cefepime, vancomycin  · CVS - actively wean levophed - has been on low dose. Pt historically has BPs in range of 80-90s. After discussion with Cardiology, will attempt to wean and discontinue levophed altogether; use IVF hydration/ boluses as need for hypotension; and should pt become persistently hypotensive, will start phenylephrine gtt. Target SBP around 85-90 mmHg.   · Heme/onc - transfusion per nephrology during dialysis  · Metabolic - replace K; trend rest of electrolytes  · Renal - hemodialysis per nephrology- MWF  · Endocrine - frequent glycemic checks while on ventilator   · Neuro/ Pain/ Sedation - fentanyl sedation as needed only  · GI -tolerating enteral nutrition; monitor gastric residuals  · Prophylaxis - DVT- will restart pt on heparin 5000 SQ q8h, GI-famotidine  · Discussed in interdisciplinary rounds          Divya Ghosh KACIE MADISON

## 2017-10-26 NOTE — PROGRESS NOTES
[facility]. Pulmonary, Critical Care, and Sleep Medicine    Name: Junito Nieto MRN: [id number]   : 1950 Hospital: [address] [place] [address] Dr   Date: 10/26/2017  Admission Date: 10/21/2017     74 yo woman with ESRD on dialysis, large L and small R pleural transudative effusions, with complete collapse of L lung due to compression from L effusion, s/p thoracentesis and pigtail chest catheter placement, with incomplete L lung expansion and pneumothorax. Persistent low BP readings have delayed surgery; patient is too ill at this time. Chest tube draining total 900cc. Patient is arousable on the ventilator today. No air leak noted. No complaints. 2 daughters at bedside were updated on her condition and plans. Cultures remain negative to date. [x]The patient is critically ill on      [x]Mechanical ventilation []Pressors   []BiPAP []   Events and notes from last 24 hours reviewed. Care plan discussed on multidisciplinary rounds.     Patient Active Problem List   Diagnosis Code    Renal failure N19    UTI (urinary tract infection) N39.0    Anemia requiring transfusions D64.9    End stage renal failure untreated by renal replacement therapy ([facility] N18.6    Near syncope R55    HCVD (hypertensive cardiovascular disease) I11.9    Second degree heart block I44.1    Vomiting R11.10    Diarrhea R19.7    Abdominal pain R10.9    Cough R05    Myalgia M79.1    Gastroenteritis K52.9    End-stage renal disease needing dialysis ([facility] N18.6, Z99.2    DVT (deep venous thrombosis) ([facility]) I82.409    High-grade AV block I44.30    Essential hypertension I10    Clotted dialysis access [facility]) T82.49XA    Arteriovenous fistula thrombosis ([facility]) X09.707S    Acute respiratory failure ([facility]) J96.00    Pleural effusion J90    ESRD on dialysis ([facility] N18.6, Z99.2    ESRD (end stage renal disease) ([facility]) N18.6    Respiratory failure ([facility]) J96.90    Altered mental status R41.82       Vital Signs: Visit Vitals    /53    Pulse 69    Temp 99.7 °F (37.6 °C)    Resp 20    Ht 5' 5\" (1.651 m)    Wt 82.9 kg (182 lb 12.2 oz)    SpO2 99%    BMI 30.41 kg/m2             Temp (24hrs), Av.7 °F (37.1 °C), Min:97.8 °F (36.6 °C), Max:99.7 °F (37.6 °C)         Intake/Output Summary (Last 24 hours) at 10/26/17 1702  Last data filed at 10/26/17 1000   Gross per 24 hour   Intake             1075 ml   Output               50 ml   Net             1025 ml      Ventilator Settings:  Ventilator  Mode: Assist control, Volume control  Respiratory Rate  Back-Up Rate: 15  Insp Time (sec): 1 sec  I:E Ratio: 1:2  Ventilator Volumes  Vt Set (ml): 375 ml  Vt Exhaled (Machine Breath) (ml): 400 ml  Vt Spont (ml): 1114 ml  Ve Observed (l/min): 7.69 l/min  Ventilator Pressures  PIP Observed (cm H2O): 19 cm H2O  Plateau Pressure (cm H2O): 20 cm H2O  MAP (cm H2O): 10  PEEP/VENT (cm H2O): 5 cm H20  Auto PEEP Observed (cm H2O): 0 cm H2O  FIO2 40%, weaned to 35%.     Current Facility-Administered Medications   Medication Dose Route Frequency    [START ON 10/28/2017] Vancomycin random level  1 Each Other Rx Dosing/Monitoring    heparin (porcine) injection 5,000 Units  5,000 Units SubCUTAneous Q8H    polyethylene glycol (MIRALAX) packet 17 g  17 g Oral DAILY    epoetin windy (EPOGEN;PROCRIT) injection 2,000 Units  2,000 Units IntraVENous Q TUE, THU & SAT    PHENYLephrine 90,000 mcg in 0.9% sodium chloride 250 ml (NEOSYNEPHRINE) (premix or compounded) infusion   mcg/min IntraVENous TITRATE    potassium, sodium phosphates (NEUTRA-PHOS) packet 1 Packet  1 Packet Per G Tube BID    chlorhexidine (PERIDEX) 0.12 % mouthwash 10 mL  10 mL Oral BID    cefepime (MAXIPIME) 500 g in 0.9% sodium chloride 100 mL IVPB  500 g IntraVENous Q24H    famotidine (PF) (PEPCID) 20 mg in sodium chloride 0.9 % 10 mL injection  20 mg IntraVENous Q24H    doxercalciferol (HECTOROL) 4 mcg/2 mL injection 6 mcg  6 mcg IntraVENous DIALYSIS TUE, THU & SAT    VANCOMYCIN INFORMATION NOTE   Other Rx Dosing/Monitoring    albuterol-ipratropium (DUO-NEB) 2.5 MG-0.5 MG/3 ML  3 mL Nebulization Q6H RT       Telemetry:     Physical Exam:   General: Intubated, Awake and alert   HEENT:  Anicteric sclerae; pink palpebral conjunctivae; mucosa moist  Resp:  Symmetrical chest expansion, EET in place on vent, equal breath sounds BL, no wheezes or rales  CV:  S1, S2 present; regular rate and rhythm  GI:  Abdomen soft, non-tender; (+) active bowel sounds  Extremities:  no edema/ cyanosis/ clubbing noted   Skin:  Warm; no rashes/ lesions noted, CT L chest wall  Neurologic:  Follows directions, no focal deficits  Devices:  NGT, ETT, chest tube, R femoral HD cath    DATA:    Labs:  Recent Labs      10/26/17   0200  10/25/17   0241  10/24/17   0340   WBC  8.4  10.4  9.1   HGB  8.9*  9.3*  9.3*   HCT  26.8*  27.3*  28.2*   PLT  162  145  151     Recent Labs      10/26/17   0200  10/25/17   0241  10/24/17   0340   NA  137  136  137   K  3.3*  3.6  2.7*   CL  107  107  107   CO2  22  20*  18*   GLU  75  107*  117*   BUN  40*  26*  43*   CREA  5.17*  4.18*  6.06*   CA  8.6  8.1*  8.2*   MG   --    --   2.1   PHOS  2.2*  1.6*  2.4*     Recent Labs      10/26/17   0451  10/25/17   0507  10/24/17   0427   FIO2I  30  35  40   HCO3I  21.4*  21.7*  18.0*   PCO2I  37.4  34.2*  34.3*   PHI  7.365  7.411  7.327*   PO2I  97  120*  142*     Pleural fluid analysis:  Reviewed. Imaging:  [x]                           I have personally reviewed the patients radiographs and reports  CXR today after RIJ attempt:  1. Left-sided pneumothorax. Drainage catheter in place. Persistent rounded  contour of the contracted left lung. No evidence of pneumothorax on the right  side. 2.  Enlargement of the cardiac silhouette. High complexity decision making was performed during this consultation and evaluation. [x]       Pt is at high risk for further organ failure and dysfunction.      Critical care time spent 40 minutes with patient exclusive of procedures. IMPRESSION:   · ESRD on dialysis TTS  · L pleural effusion, transudative, non-infected  · Incomplete expansion of L lung with persistent PTX, with CT to suction, awaiting surgery if she improves medically  · Hypotensive, on empiric vanco  · Acute respiratory failure, failed weaning 10/26/2017 with RSBI >110 after 7 minutes on PS7, CPAP 5   · Paced rhythm, stable cardiac status; off amiodarone  · GI; Obesity; on Nepro 20/hr  · ICU PPX      PLAN:   1. Monitor Culture results  2. Possible VAT per CT surgery if patient stabilizes medically  3. Continue chest tube to 30 cm suction for PTX  4. Follow daily CXR  5. Vanco, cefipime  6. ESRD management per renal  7. Maintain on mechanical ventilation, Duoneb  8.   Dispo: critically ill, with high risk of deterioration, multiple conditions presenting immediate threat to life; is on intensive support         Pamela Orellana MD   Pulmonary and critical care medicine  10/26/2017  5:10 PM

## 2017-10-26 NOTE — PROGRESS NOTES
Red port of dialysis lumen accessed to give levophed, 10 cc blank drawn, then line flushed with 10 cc saline before using lumen also permission to use it was cleared with Prasad Olvera

## 2017-10-26 NOTE — ROUTINE PROCESS
Dr Sean Roche called and spoke with Isamar milan regarding hypotension  Treatment bp dropping to 70 systolic this afternoon, pt remains very awake, note earlier when pt received fentanyl it did not appear to affect her, she was only slightly drowsy but pain free

## 2017-10-26 NOTE — PROGRESS NOTES
[facility]    Levo restarted for hypotension in SBP to 70's, now on 4 mcg with SBP  by cuff. OR timing in evolution pending patient status. Vital signs:   Visit Vitals    /54    Pulse 71    Temp 98.6 °F (37 °C)    Resp 22    Ht 5' 5\" (1.651 m)    Wt 82.9 kg (182 lb 12.2 oz)    SpO2 100%    BMI 30.41 kg/m2     Temp (24hrs), Av.6 °F (37 °C), Min:97.8 °F (36.6 °C), Max:99.1 °F (37.3 °C)    Admission Weight: Last Weight   Weight: 91.6 kg (202 lb) Weight: 82.9 kg (182 lb 12.2 oz)     CXR:  IMPRESSION:     1. High riding position of the NG tube. Unchanged. Other support devices  appear adequate in position.     2.  Persistent left-sided pneumothorax. Suggestive of trapped lung.     3. Increased retrocardiac opacity, suggestive of reaccumulation of fluid. CT chest 10/25/17  Impression:     Persistent moderate to large left pneumothorax with pigtail catheter in place. There is also a small volume of left pleural effusion remaining.     Extensive incomplete expansion of the left upper lobe and left lower lobe with  opacities in periphery of both the left upper and left lower lobes. There is  likely a component of volume loss in these opacities with a stellate appearance  of the bronchovascular bundles suggesting predominantly volume loss, possibly  rounded atelectasis or scar. However, underlying mass remains difficult to  entirely exclude particularly in the left upper lobe with a more focal rounded  area measuring 1.8 x 1.3 cm. Continued follow-up is advised.  PET/CT could be  utilized for further imaging evaluation.     Small to moderate right pleural effusion with associated atelectasis and/or  infiltrates.     Prominent precarinal lymph node     Diffuse thickening of the adrenal glands, left greater than right suggesting  adrenal hyperplasia.     Atrophic kidneys and prior cholecystectomy.     Reviewed with ICU physician at the time of dictation.             Saroj Vann PA-C CVTS  10/26/17, 12:18 PM      CARDIOTHORACIC ATTENDING STAFF NOTE    Patient resting comfortably in bed. Pressors shot off last night, but with hypotension, re-started this morning. Notes no pain at chest tube site. Stable VS.     Air leak not present. Discussed case with PA on our service and with patient and her family. Need to have patient stable, off pressors before proceeding with decortication, as the fluid shifts with this operation will be risky for the patient if she is not. Continue present care, chest tube to suction for now.     Matias Oneli MD

## 2017-10-26 NOTE — PROGRESS NOTES
[facility]  -  Progress Note      Patient: Norma Finn MRN: [id number]  SSN: [id number]    YOB: 1950  Age: 75 y.o. Sex: female      Admit Date: 10/21/2017     The patient was seen, examined, and independently evaluated and I agree with the below assessment and plan by Talha Le PA-C with the following comments. Will continue blood pressure support for now with low dose Levophed, but historically this patient usually has a fairly low blood pressure in the 26-59 systolic range normally and she does have a hypertrophic cardiomyopathy with left ventricular outflow tract obstruction so were going to need to be very cautious if her blood pressure drops not to push medication like levo fed and I would recommend possibly switching to Mika-Synephrine and giving fluids if the patient became significantly hypotensive so we do not create a problem with tachycardia which could increase her left ventricular outflow tract obstruction. Assessment:     -Acute hypercarbic respiratory failure with respiratory acidosis, intubated  -NSVT in setting of respiratory failure, PTX, pleural effusion  -Hypertrophic cardiomyopathy on echo 10/23/2017: markedly increased LV wall thickness with dynamic obstruction at rest and mid-cavity obliteration and peak gradient 38 mmHg.  -Echo 10/2017: small LV cavity, EF 65-70%, no obvious wall motion abnormalities, mildly dilated LA/RA, moderate IL  -Hx high-grade 2nd degree AV block.  Pt with documented Mobitz type II, second-degree AV block and 2:1 AV block with near syncope.   -S/p PPM placement - Medtronic Adapta [id number]  -Large left pleural effusion, cardiothoracic surgery following  -Hydropneumothorax, being followed by cardiothoracic surgery, who plans on performing lung decortication once more hemodynamically stable/off pressors  -Hx HTN  -ESRD, on HD T/R/S   -Anemia of chronic disease  -Secondary hyperparathyroidism of CKD  -Hx upper extremity DVT  -Quadriplegic, bed bound   -History of CVA with right sided weakness   -HCVD with severe LVH and normal EF 65% 2011, hyperdynamic LF function 75-80% on echo this admission   -Hx sleep apnea, but apparently improved with wt loss      Primary cardiologist is Dr. Robel Bassett:     She has been started back on Levophed this morning. Dr. Salome Urbano was planning on thoracotomy for lung decortication but pt needs to be more hemodynamically stable prior to surgery. Will continue to follow. Holding BB currently due to hemodynamics, will follow and add once hemodynamics improve. Subjective:     Hypotensive overnight. Intubated. Denies pain.     Objective:      Patient Vitals for the past 8 hrs:   Temp Pulse Resp BP SpO2   10/26/17 0930 - 81 18 - 100 %   10/26/17 0900 - 70 17 107/54 -   10/26/17 0800 - 65 16 (!) 73/40 100 %   10/26/17 0741 - 65 19 - 100 %   10/26/17 0715 98.8 °F (37.1 °C) - - - -   10/26/17 0600 - 61 15 (!) 82/53 100 %   10/26/17 0500 - 69 18 97/54 100 %   10/26/17 0437 - 71 16 - 100 %   10/26/17 0400 - 81 22 (!) 74/58 92 %   10/26/17 0300 - 69 15 (!) 83/45 100 %         Patient Vitals for the past 96 hrs:   Weight   10/25/17 2300 82.9 kg (182 lb 12.2 oz)   10/25/17 0000 82 kg (180 lb 12.4 oz)   10/24/17 0601 82 kg (180 lb 12.4 oz)   10/23/17 0600 77.2 kg (170 lb 3.1 oz)   10/23/17 0400 77.2 kg (170 lb 3.1 oz)         Intake/Output Summary (Last 24 hours) at 10/26/17 1005  Last data filed at 10/26/17 0600   Gross per 24 hour   Intake             1390 ml   Output             2750 ml   Net            -1360 ml       Physical Exam:  General:  alert, cooperative, no distress, appears stated age  Lungs:  Coarse breath sounds to anterior lung fields, intubated  Heart:  Irregular rhythm  Extremities:  no edema    Data Review:     Labs: Results:       Chemistry Recent Labs      10/26/17   0200  10/25/17   0241  10/24/17   0340   GLU  75  107*  117*   NA  137  136  137   K  3.3*  3.6  2.7*   CL  107  107 107   CO2  22  20*  18*   BUN  40*  26*  43*   CREA  5.17*  4.18*  6.06*   CA  8.6  8.1*  8.2*   MG   --    --   2.1   PHOS  2.2*  1.6*  2.4*   AGAP  8  9  12   BUCR  8*  6*  7*      CBC w/Diff Recent Labs      10/26/17   0200  10/25/17   0241  10/24/17   0340   WBC  8.4  10.4  9.1   RBC  2.70*  2.78*  2.82*   HGB  8.9*  9.3*  9.3*   HCT  26.8*  27.3*  28.2*   PLT  162  145  151   GRANS  76*  84*  85*   LYMPH  12*  2*  6*   EOS  2  2  1      Cardiac Enzymes Lab Results   Component Value Date/Time     (H) 10/25/2017 04:00 PM    CKMB 9.0 (H) 10/25/2017 04:00 PM    CKND1 1.8 10/25/2017 04:00 PM    TROIQ 0.12 (H) 10/25/2017 04:00 PM      Lipid Panel Lab Results   Component Value Date/Time    Cholesterol, total 110 05/16/2011 09:50 AM    HDL Cholesterol 34 05/16/2011 09:50 AM    LDL, calculated 43.6 05/16/2011 09:50 AM    VLDL, calculated 32.4 05/16/2011 09:50 AM    Triglyceride 162 05/16/2011 09:50 AM    CHOL/HDL Ratio 3.2 05/16/2011 09:50 AM      Thyroid Studies Lab Results   Component Value Date/Time    TSH 0.89 09/03/2014 07:40 AM          .

## 2017-10-26 NOTE — PROGRESS NOTES
attended the interdisciplinary rounds for [facility], who is a 75 y.o.,female. Patients Primary Language is: [place]. According to the patients EMR Druze Affiliation is: [facility].     The reason the Patient came to the hospital is:   Patient Active Problem List    Diagnosis Date Noted    Acute respiratory failure ([address] 10/21/2017    Pleural effusion 10/21/2017    ESRD on dialysis ([address] 10/21/2017    ESRD (end stage renal disease) ([address] 10/21/2017    Respiratory failure ([address] 10/21/2017    Altered mental status 10/21/2017    Clotted dialysis access ([address] 09/21/2017    Arteriovenous fistula thrombosis ([address] 09/21/2017    DVT (deep venous thrombosis) ([address] 02/17/2016    High-grade AV block 02/17/2016    Essential hypertension 02/17/2016    End-stage renal disease needing dialysis ([address] 09/29/2015    Vomiting 02/03/2015    Diarrhea 02/03/2015    Abdominal pain 02/03/2015    Cough 02/03/2015    Myalgia 02/03/2015    Gastroenteritis 02/03/2015    HCVD (hypertensive cardiovascular disease) 10/15/2014    Second degree heart block 10/15/2014    Near syncope 09/02/2014    Renal failure 06/26/2014    UTI (urinary tract infection) 06/26/2014    Anemia requiring transfusions 06/26/2014    End stage renal failure untreated by renal replacement therapy ([address] 06/26/2014      Plan:  Chaplains will continue to follow and will provide pastoral care on an as needed/requested basis.  recommends bedside caregivers page  on duty if patient shows signs of acute spiritual or emotional distress.     [address] [employer] [facility] [employer]  Board Certified [address] [facility]   [phone]

## 2017-10-27 NOTE — PROGRESS NOTES
[facility]  -  Progress Note      Patient: Susan Esquivel MRN: [id number]  SSN: [id number]    YOB: 1950  Age: 75 y.o. Sex: female      Admit Date: 10/21/2017    Assessment:     -Acute hypercarbic respiratory failure with respiratory acidosis, intubated  -NSVT in setting of respiratory failure, PTX, pleural effusion  -Hypertrophic cardiomyopathy on echo 10/23/2017: markedly increased LV wall thickness with dynamic obstruction at rest and mid-cavity obliteration and peak gradient 38 mmHg.  -Echo 10/2017: small LV cavity, EF 65-70%, no obvious wall motion abnormalities, mildly dilated LA/RA, moderate CO  -Hx high-grade 2nd degree AV block.  Pt with documented Mobitz type II, second-degree AV block and 2:1 AV block with near syncope. -S/p PPM placement - [employer] Adapta [id number]  -Large left pleural effusion, cardiothoracic surgery following  -Hydropneumothorax, being followed by cardiothoracic surgery, who plans on performing lung decortication once more hemodynamically stable/off pressors  -Hx HTN  -ESRD, on HD T/R/S   -Anemia of chronic disease  -Secondary hyperparathyroidism of CKD  -Hx upper extremity DVT  -Quadriplegic, bed bound   -History of CVA with right sided weakness   -HCVD with severe LVH and normal EF 65% 2011, hyperdynamic LF function 75-80% on echo this admission   -Hx sleep apnea, but apparently improved with wt loss      Primary cardiologist is Dr. Loring Goldmann:     Has been maintaining BP on Neosynephrine drip overnight. Echo this week with hyperdynamic left ventricular and mid-cavitary obstruction. On dialysis for ESRD. I discussed with ICU team that patient likely dehydrated and needs higher LVEDP to prevent obstruction (reason for Mika to prevent worsening/hyperdynamic obstruction in setting of hyperdynamic left ventricular function). Trying to wean in preparation for surgery by Dr. Mariajose Lindquist.     Can give IVF from my standpoint and wean Mika to keep SBP > 90 mmHg. Subjective: Intubated. Denies pain. On Mika drip.     Objective:      Patient Vitals for the past 8 hrs:   Temp Pulse Resp BP SpO2   10/27/17 0900 - 78 19 138/60 100 %   10/27/17 0845 - 85 18 126/82 -   10/27/17 0835 - 86 26 - 97 %   10/27/17 0815 - 73 20 114/56 100 %   10/27/17 0800 - 68 14 123/64 95 %   10/27/17 0742 99.5 °F (37.5 °C) - - - -   10/27/17 0715 - 68 15 106/59 100 %   10/27/17 0700 - 65 (!) 7 110/59 100 %   10/27/17 0645 - 67 (!) 0 118/59 100 %   10/27/17 0630 - 89 14 102/67 -   10/27/17 0600 - 62 14 128/68 100 %   10/27/17 0530 - 76 15 114/50 100 %   10/27/17 0500 - 81 15 117/71 100 %   10/27/17 0430 - 71 15 119/63 100 %   10/27/17 0400 96.7 °F (35.9 °C) 72 16 113/71 100 %   10/27/17 0330 - 68 17 125/74 100 %   10/27/17 0318 - - 15 - -   10/27/17 0315 - 75 18 127/50 100 %   10/27/17 0300 - 70 16 (!) 80/57 100 %   10/27/17 0245 - 76 16 133/46 100 %   10/27/17 0230 - 72 15 102/51 100 %   10/27/17 0200 - 63 15 115/52 100 %   10/27/17 0148 - - - - 100 %         Patient Vitals for the past 96 hrs:   Weight   10/26/17 2149 83.2 kg (183 lb 6.8 oz)   10/25/17 2300 82.9 kg (182 lb 12.2 oz)   10/25/17 0000 82 kg (180 lb 12.4 oz)   10/24/17 0601 82 kg (180 lb 12.4 oz)         Intake/Output Summary (Last 24 hours) at 10/27/17 0935  Last data filed at 10/27/17 0200   Gross per 24 hour   Intake          1130.25 ml   Output              750 ml   Net           380.25 ml       Physical Exam:  General:  alert, cooperative, no distress, appears stated age  Lungs:  clear to auscultation bilaterally to anterior lung fields  Heart:  regularly irregular rhythm  Abdomen:  abdomen is soft without significant tenderness, masses, organomegaly or guarding  Extremities:  no edema    Data Review:     Labs: Results:       Chemistry Recent Labs      10/27/17   0131  10/26/17   0200  10/25/17   0241   GLU  89  75  107*   NA  137  137  136   K  3.7  3.3*  3.6   CL  108  107  107   CO2  22  22  20*   BUN  25* 40*  26*   CREA  3.76*  5.17*  4.18*   CA  8.0*  8.6  8.1*   MG  2.0   --    --    PHOS  1.7*  2.2*  1.6*   AGAP  7  8  9   BUCR  7*  8*  6*      CBC w/Diff Recent Labs      10/27/17   0131  10/26/17   0200  10/25/17   0241   WBC  8.2  8.4  10.4   RBC  2.97*  2.70*  2.78*   HGB  9.7*  8.9*  9.3*   HCT  29.8*  26.8*  27.3*   PLT  158  162  145   GRANS  86*  76*  84*   LYMPH  6*  12*  2*   EOS  1  2  2      Lipid Panel Lab Results   Component Value Date/Time    Cholesterol, total 110 05/16/2011 09:50 AM    HDL Cholesterol 34 05/16/2011 09:50 AM    LDL, calculated 43.6 05/16/2011 09:50 AM    VLDL, calculated 32.4 05/16/2011 09:50 AM    Triglyceride 162 05/16/2011 09:50 AM    CHOL/HDL Ratio 3.2 05/16/2011 09:50 AM      Thyroid Studies Lab Results   Component Value Date/Time    TSH 0.89 09/03/2014 07:40 AM

## 2017-10-27 NOTE — PROGRESS NOTES
NUTRITION    Nutrition Screen      RECOMMENDATIONS / PLAN:     - Continue tube feeding of Nepro at goal rate of 45 mL/hr with 100 mL q 6 hour water flushes.   - Continue RD inpatient monitoring and evaluation. Goal Regimen: Nepro at 45 mL/hr + 100 mL q 6 hour water flushes to provide: 1944 kcal, 87 gm protein, 104 gm fat, 180 gm CHO, 17 gm fiber, 783 mL free water, 1183 mL total water, 100% RDIs     NUTRITION INTERVENTIONS & DIAGNOSIS:     [x] Enteral nutrition support: continue   [x] Coordination of care: interdisciplinary rounds    Nutrition Diagnosis: Inadequate oral intake related to inability to tolerate po as evidenced by pt NPO. ASSESSMENT:     10/27: Tolerating feeds. Phosphorus replaced. HD 10/26.   10/24: Tolerating feeds at goal. HD today. 10/23: Intubated 10/22. S/p thoracentesis and bronch. ESRD on HD. NGT to low continuous suction, will start feeds today. EN infusion adequate to meet patients estimated nutritional needs:   [x] Yes     [] No   [] Unable to determine at this time    Tube Feeding: Nepro at 45 mL/hr via NGT   Water Flushes: 100 mL q 6 hours   Residuals: 0 mL    Diet: DIET TUBE FEEDING   Food Allergies: NKFA  Current Appetite:   [] Good     [] Fair     [] Poor     [x] Other: NPO  Appetite/meal intake prior to admission:   [] Good     [] Fair     [] Poor     [x] Other: unknown  Feeding Limitations:  [] Swallowing difficulty    [] Chewing difficulty    [x] Other: intubated  Current Meal Intake: No data found.     Anuric   BM: 10/27  Skin Integrity: WDL; chest tube   Edema: trace LEs and LUE, 2+ RUE  Pertinent Medications: Reviewed: Zofran, pepcid, miralax, neutraphos, neosynephrine     Recent Labs      10/27/17   0131  10/26/17   0200  10/25/17   0241   NA  137  137  136   K  3.7  3.3*  3.6   CL  108  107  107   CO2  22  22  20*   GLU  89  75  107*   BUN  25*  40*  26*   CREA  3.76*  5.17*  4.18*   CA  8.0*  8.6  8.1*   MG  2.0   --    --    PHOS  1.7*  2.2*  1.6* Intake/Output Summary (Last 24 hours) at 10/27/17 1110  Last data filed at 10/27/17 1057   Gross per 24 hour   Intake          1708.86 ml   Output              930 ml   Net           778.86 ml       Anthropometrics:  Ht Readings from Last 1 Encounters:   10/25/17 5' 5\" (1.651 m)     Last 3 Recorded Weights in this Encounter    10/25/17 0000 10/25/17 2300 10/26/17 2149   Weight: 82 kg (180 lb 12.4 oz) 82.9 kg (182 lb 12.2 oz) 83.2 kg (183 lb 6.8 oz)     Body mass index is 30.52 kg/(m^2). Obese, Class I    Weight History:   Weight Metrics 10/26/2017 9/21/2017 4/17/2017 2/17/2016 9/29/2015 2/3/2015 11/10/2014   Weight 183 lb 6.8 oz 176 lb 2.4 oz 168 lb 180 lb 8.9 oz 155 lb 6.8 oz 167 lb 172 lb   BMI 30.52 kg/m2 28.43 kg/m2 26.31 kg/m2 28.27 kg/m2 24.34 kg/m2 26.97 kg/m2 27.77 kg/m2        Admitting Diagnosis: Pleural effusion  Acute respiratory failure (HCC)  ESRD on dialysis (HCC)  Acute respiratory failure (HCC)  ESRD (end stage renal disease) ([facility]  Pleural effusion  Respiratory failure (HCC)  Altered mental status  Pertinent PMHx: ESRD on HD, HTN, stroke     Education Needs:        [x] None identified  [] Identified - Not appropriate at this time  []  Identified and addressed - refer to education log  Learning Limitations:   [] None identified  [x] Identified: intubated     Cultural, Bahai & ethnic food preferences:  [x] None identified    [] Identified and addressed     ESTIMATED NUTRITION NEEDS:     Calories: 5309-7642 kcal ([id number]) based on  [x] Actual BW 77 kg     [] IBW   Protein:  gm (1.2-2 gm/kg) based on  [x] Actual BW      [] IBW   Fluid: 3186-2566 mL     MONITORING & EVALUATION:     Nutrition Goal(s):   1. Nutritional needs will be met through adequate oral intake or nutrition support within the next 7 days.   Outcome:  [x] Met/Ongoing    []  Not Met    [] New/Initial Goal      Monitoring:   [x] EN tolerance   [x] EN infusion   [] Supplement intake   [x] GI symptoms/ability to tolerate po diet   [x] Respiratory status   [x] Plan of care      Previous Recommendations (for follow-up assessments only):     [x]   Implemented       []   Not Implemented (RD to address)     [] No Recommendation Made     Discharge Planning: pending ability to tolerate po and plan of care, enteral nutrition until able to tolerate po   [x] Participated in care planning, discharge planning, & interdisciplinary rounds as appropriate      Rey Johnson, [address] [place] [address]    Pager: [phone]

## 2017-10-27 NOTE — PROGRESS NOTES
[facility] Hospitalist Group  Progress Note    Patient: Akiko Salmeron Age: 75 y.o. : 1950 MR#: [id number] SSN: [id number]  Date: 10/27/2017     Subjective: no current complaints    Assessment/Plan:   1. Acute hypercarbic respiratory failure with respiratory acidosis -    Persistent left sided pneumonthorax, noted plans per CTS for thoracotomy and decortication once BP more stable / off pressors and plan to biospy intraoperatively. Cont chest tube to suction per CTS. Continue vent management per PCCM. 2.  ESRD on HD - nephrology following. Plan for dialysis 10/28. Cont Tues-Thurs - Sat schedule  3. Ventricular Tachycardia - in setting of acute illness; s/p PPM in   4. Anemia of chronic disease - cont procrit, stable, follow labs  5. Hypotension - titrate off pressor as able. Add low dose metoprolol as BP allows. 6.  Mild hyponatremia - resolved  7. Tremor - new onset since admission, in setting of acute illness; follow  8. SIRS in setting of pleural effusion - all cultures have remained negative, vancomycin discontinued. Case discussed with:  [x]Patient  [x]Family  [x]Nursing  []Case Management  DVT Prophylaxis:  []Lovenox  [x]Hep SQ  []SCDs  []Coumadin   []On Heparin gtt    Objective:   VS:   Visit Vitals    /55    Pulse 74    Temp 98.8 °F (37.1 °C)    Resp 18    Ht 5' 5\" (1.651 m)    Wt 83.2 kg (183 lb 6.8 oz)    SpO2 100%    BMI 30.52 kg/m2      Tmax/24hrs: Temp (24hrs), Av.6 °F (37 °C), Min:96.7 °F (35.9 °C), Max:99.7 °F (37.6 °C)      Intake/Output Summary (Last 24 hours) at 10/27/17 1426  Last data filed at 10/27/17 1400   Gross per 24 hour   Intake          1888.86 ml   Output              930 ml   Net           958.86 ml     General: awake, intubated, follows commands, appears in no distress  HEENT: non icteric, ET/NGT in place  Neck: No JVD  Cardiovascular: RRR  C/L: bilateral vent breath sounds, no rales/wheezing.   Abdomen: soft, + BS  Ext: Right femoral HD cath, no LE edema     Labs:    Recent Results (from the past 24 hour(s))   GLUCOSE, POC    Collection Time: 10/26/17  4:55 PM   Result Value Ref Range    Glucose (POC) 85 70 - 110 mg/dL   GLUCOSE, POC    Collection Time: 10/26/17 10:58 PM   Result Value Ref Range    Glucose (POC) 82 70 - 239 mg/dL   METABOLIC PANEL, BASIC    Collection Time: 10/27/17  1:31 AM   Result Value Ref Range    Sodium 137 136 - 145 mmol/L    Potassium 3.7 3.5 - 5.5 mmol/L    Chloride 108 100 - 108 mmol/L    CO2 22 21 - 32 mmol/L    Anion gap 7 3.0 - 18 mmol/L    Glucose 89 74 - 99 mg/dL    BUN 25 (H) 7.0 - 18 MG/DL    Creatinine 3.76 (H) 0.6 - 1.3 MG/DL    BUN/Creatinine ratio 7 (L) 12 - 20      GFR est AA 14 (L) >60 ml/min/1.73m2    GFR est non-AA 12 (L) >60 ml/min/1.73m2    Calcium 8.0 (L) 8.5 - 10.1 MG/DL   CBC WITH AUTOMATED DIFF    Collection Time: 10/27/17  1:31 AM   Result Value Ref Range    WBC 8.2 4.6 - 13.2 K/uL    RBC 2.97 (L) 4.20 - 5.30 M/uL    HGB 9.7 (L) 12.0 - 16.0 g/dL    HCT 29.8 (L) 35.0 - 45.0 %    .3 (H) 74.0 - 97.0 FL    MCH 32.7 24.0 - 34.0 PG    MCHC 32.6 31.0 - 37.0 g/dL    RDW 16.5 (H) 11.6 - 14.5 %    PLATELET 650 102 - 877 K/uL    MPV 10.7 9.2 - 11.8 FL    NEUTROPHILS 86 (H) 42 - 75 %    LYMPHOCYTES 6 (L) 20 - 51 %    MONOCYTES 7 2 - 9 %    EOSINOPHILS 1 0 - 5 %    BASOPHILS 0 0 - 3 %    ABS. NEUTROPHILS 7.0 1.8 - 8.0 K/UL    ABS. LYMPHOCYTES 0.5 (L) 0.8 - 3.5 K/UL    ABS. MONOCYTES 0.6 0 - 1.0 K/UL    ABS. EOSINOPHILS 0.1 0.0 - 0.4 K/UL    ABS.  BASOPHILS 0.0 0.0 - 0.06 K/UL    DF AUTOMATED      PLATELET COMMENTS ADEQUATE PLATELETS      RBC COMMENTS ANISOCYTOSIS  1+        RBC COMMENTS MACROCYTOSIS  1+       MAGNESIUM    Collection Time: 10/27/17  1:31 AM   Result Value Ref Range    Magnesium 2.0 1.6 - 2.6 mg/dL   PHOSPHORUS    Collection Time: 10/27/17  1:31 AM   Result Value Ref Range    Phosphorus 1.7 (L) 2.5 - 4.9 MG/DL   POC G3    Collection Time: 10/27/17  5:26 AM Result Value Ref Range    Device: VENT      FIO2 (POC) 30 %    pH (POC) 7.378 7.35 - 7.45      pCO2 (POC) 38.6 35.0 - 45.0 MMHG    pO2 (POC) 115 (H) 80 - 100 MMHG    HCO3 (POC) 22.7 22 - 26 MMOL/L    sO2 (POC) 98 (H) 92 - 97 %    Base deficit (POC) 2 mmol/L    Mode ASSIST CONTROL      Tidal volume 375 ml    Set Rate 15 bpm    PEEP/CPAP (POC) 5 cmH2O    Allens test (POC) N/A      Total resp.  rate 16      Site RIGHT RADIAL      Specimen type (POC) ARTERIAL      Performed by Eliel Marcial     Volume control YES     GLUCOSE, POC    Collection Time: 10/27/17  5:58 AM   Result Value Ref Range    Glucose (POC) 93 70 - 110 mg/dL   GLUCOSE, POC    Collection Time: 10/27/17 11:37 AM   Result Value Ref Range    Glucose (POC) 91 70 - 110 mg/dL     Signed By: Keyana Kenney NP     October 27, 2017

## 2017-10-27 NOTE — DIALYSIS
ACUTE HEMODIALYSIS FLOW SHEET    HEMODIALYSIS ORDERS: Physician: Chang Bear     Dialyzer: revaclear         Duration: 2.5  hr  BFR: 300   DFR: 600   Dialysate:  Temp  37 K+  3     Ca+  2.5      Na 140       Bicarb 30   Weight:    Bed Scale [x]     Unable to Obtain []      Dry weight/UF Goal: 500 ml   Access  CVL    Needle Gauge    Heparin []  Bolus      Units    [] Hourly       Units    [x]None     Catheter locking solution    Pre BP:99/42    Pulse:83   Temperature: 98.7  Respirations: 16  Tx: NS       ml/Bolus  Other        [x] N/A   Labs: Pre        Post:        [x] N/A   Additional Orders(medications, blood products, hypotension management):       [x] N/A     [x] Time Out/Safety Check  [x] [facility] Consent Verified     CATHETER ACCESS: [x]N/A   [x]Right   []Left   []IJ     [x]Fem   [] First use X-ray verified     [x]Tunnel                [] Non Tunneled   [x]No S/S infection  []Redness  []Drainage []Cultured []Swelling []Pain   [x]Medical Aseptic Prep Utilized   []Dressing Changed  [] Biopatch  Date:       []Clotted   [x]Patent   Flows: [x]Good  []Poor  []Reversed   If access problem,  notified: []Yes    [x]N/A  Date:           GRAFT/FISTULA ACCESS:  [x]N/A     []Right     []Left     []UE     []LE   []AVG   []AVF        []Buttonhole    []Medical Aseptic Prep Utilized   [x]No S/S infection  []Redness  []Drainage []Cultured []Swelling []Pain    Bruit:   [] Strong    [] Weak       Thrill :   [] Strong    [] Weak       Needle Gauge:    Length:     If access problem,  notified: []Yes     [x]N/A  Date:        Please describe access if present and not used:       GENERAL ASSESSMENT:    LUNGS:  Rate  SaO2%   98     [x] N/A    [] Clear  [] Coarse  [] Crackles  [] Wheezing        [x] Diminished     Location : []RLL   []LLL    []RUL  []CHI   Cough: []Productive  []Dry  [x]N/A   Respirations:  [x]Easy  []Labored   Therapy:  []RA  []NC  l/min    Mask: []NRB []Venti       O2%                  [x]Ventilator [x]Intubated  [] Trach  [] BiPaP   CARDIAC: [x]Regular      [] Irregular   [] Pericardial Rub  [] JVD        []  Monitored  [] Bedside  [] Remotely monitored [] N/A  Rhythm:    EDEMA: [x] None  []Generalized  [] Pitting [] 1    [] 2    [] 3    [] 4                 [] Facial  [] Pedal  []  UE  [] LE   SKIN:   [x] Warm  [] Hot     [] Cold   [x] Dry     [] Pale   [] Diaphoretic                  [] Flushed  [] Jaundiced  [] Cyanotic  [] Rash  [] Weeping   LOC:    [x] Alert      [x]Oriented:    [x] Person     [x] Place  [x]Time               [] Confused  [] Lethargic  [] Medicated  [] Non-responsive     GI / ABDOMEN:  [] Flat    [] Distended    [x] Soft    [] Firm   []  Obese                             [] Diarrhea  [x] Bowel Sounds  [] Nausea  [] Vomiting       / URINE ASSESSMENT:[] Voiding   [x] Oliguria  [] Anuria   []  Saunders     [] Incontinent    []  Incontinent Brief      []  Bathroom Privileges     PAIN: [x] 0 []1  []2   []3   []4   []5   []6   []7   []8   []9   []10            Scale 0-10  Action/Follow Up:    MOBILITY:  [] Amb    [] Amb/Assist    [x] Bed    [] Wheelchair  [] Stretcher      All Vitals and Treatment Details on Attached [address]: [facility]          Room # 305     [] 1st Time Acute  [] Stat  [x] Routine  [] Urgent     [x] Acute Room  []  Bedside  [x] ICU/CCU  [] ER   Isolation Precautions:  [x] Dialysis   [] Airborne   [] Contact    [] Reverse   Special Considerations:         [] Blood Consent Verified [x]N/A     ALLERGIES:   [] NKA       Shellfish derived product   Code Status:  [] Full Code  [x] DNR  [] Other           HBsAg ONLY: Date Drawn 10/24/17         [x]Negative []Positive []Unknown   HBsAb: Date  10/24/17    [x] Susceptible   [] [id number] []Not Drawn  [] Drawn     Current Labs:    Date of Labs: Today [x]        Cut and paste current labs here. DIET:  [x] Renal    [] Other     [] NPO     []  Diabetic      PRIMARY NURSE REPORT: First initial/Last name/Title      Pre Dialysis: Kinga GONG RN    Time: 200      EDUCATION:    [x] Patient [] Other         Knowledge Basis: []None [x]Minimal [] Substantial   Barriers to learning  [x]N/A   [] Access Care     [] S&S of infection     [] Fluid Management     []K+     [x]Procedural    []Albumin     [] Medications     [] Tx Options     [] Transplant     [] Diet     [] Other   Teaching Tools:  [x] Explain  [] Demo  [] Handouts [] Video  Patient response:   [x] Verbalized understanding  [] Teach back  [] Return demonstration [] Requires follow up   Inappropriate due to            [address] Before each treatment:     Machine Number:                   [facility]                                  [x] Unit Machine # 3 with centralized RO                                  [] Portable Machine #1/RO serial # [id number]                                  [] Portable Machine #2/RO serial # [id number]                                  [] Portable Machine #3/RO serial # [id number]                                                                                                       [facility]                                  [] Portable Machine #11/RO serial # [id number]                                   [] Portable Machine #12/RO serial # [id number]                                  [] Portable Machine #13/RO serial #  [id number]      Alarm Test:  Pass time 9349         Other:         [x] RO/Machine Log Complete   Temp    37            [x]Extracorporeal Circuit Tested for integrity   Dialysate: pH  7.4 Conductivity: Meter   14     HD Machine   14                  TCD: 14  Dialyzer Lot # [id number]        Blood Tubing Lot # [id number]   Saline Lot #  [id number]     CHLORINE TESTING-Before each treatment and every 4 hours    Total Chlorine: [x] less than 0.1 ppm  Time: 1820 4 Hr/2nd Check Time:    (if greater than 0.1 ppm from Primary then every 30 minutes from Secondary)     TREATMENT INITIATION  with Dialysis Precautions:   [x] All Connections Secured                 [x] Saline Line Double Clamped   [x] Venous Parameters Set                  [x] Arterial Parameters Set    [x] Prime Given  250 ml                        [x]Air Foam Detector Engaged      Treatment Initiation Note:  Arrived to ICU room, pt alert, intubated, on vent. No distress noted prior to HD tx. Right femoral HD cath, accessed and treatment initiated without difficulty. Medication Dose Volume Route Initials Dialyzer Cleared: [x] Good [] Fair  [] Poor    Blood processed:  48 L  UF Removed  600 Ml    Post Wt: kg  POst BP: 105/63  Pulse: 73    Respirations: 18  Temperature: 97.8                    Post Tx Vascular Access: AVF/AVG: Bleeding stopped Art  min. Howard. Min                              Catheter: Locking solution: Heparin 1:1000 Art. 2.3 Howard. 2.4  N/A                             Post Assessment:                             Skin:  [x] Warm  [x] Dry [] Diaphoretic    [] Flushed  [] Pale [] Cyanotic   [facility] Signatures Title Initials  Time Lungs: [x] Clear    [] Course  [] Crackles  [] Wheezing [] Diminished   Kathee Comment RN NP  Cardiac: [x] Regular   [] Irregular   [] Monitor  [] N/A  Rhythm:           Edema:  [x] None    [] General     [] Facial   [] Pedal    [] UE    [] LE       Pain: [x]0  []1  []2   []3  []4   []5   []6   []7   []8   []9   []10         Post Treatment Note: Pt has completed HD tx, well tolerated. Net fluid removed 600 ml. Transfused 1 unit PRBC during HD tx. No acute distress noted during or post HD treatment.       POST TREATMENT PRIMARY NURSE HANDOFF REPORT:     First initial/Last name/Title         Post Dialysis: Zena Franco RN Time:  2110     Abbreviations: AVG-arterial venous graft, AVF-arterial venous fistula, IJ-Internal Jugular, Subcl-Subclavian, Fem-Femoral, Tx-treatment, AP/HR-apical heart rate, DFR-dialysate flow rate, BFR-blood flow rate, AP-arterial pressure, -venous pressure, UF-ultrafiltrate, TMP-transmembrane pressure, Howard-Venous, Art-Arterial, RO-Reverse Osmosis

## 2017-10-27 NOTE — PROGRESS NOTES
[facility]  ICU Progress Note      Name: Sarah Garcia   : 1950   MRN: [id number]   Date: 10/27/2017 9:27 AM     [x]I have reviewed the flowsheet and previous days notes. Events overnight reviewed and discussed with nursing staff. Vital signs and records reviewed. Subjective:  74 yo woman with ESRD on dialysis, large L and small R pleural transudative effusions, with complete collapse of L lung due to compression from L effusion, s/p thoracentesis and pigtail chest catheter placement, with incomplete L lung expansion and pneumothorax. Required lots of suctioning overnight. HD yesterday. Awaiting to be off pressors prior to surgery. No acute events overnight. No complaints. [x]The patient is unable to give any meaningful history or review of systems because the patient is:  [x]Intubated []Sedated   []Unresponsive      [x]The patient is critically ill on      [x]Mechanical ventilation [x]Pressors   []BiPAP []                 ROS:Review of systems not obtained due to patient factors.     Medication Review:  · Pressors - neosynephrine @45  · Sedation - none  · Antibiotics - cefepime, vanc  · Pain - fentanyl PRN, midazolam PRN  · GI/ DVT - pepcid/Heparin  · Others (other gtts)    Safety Bundles: Electrolyte Replacement Protocol    Vital Signs:    Visit Vitals    /60    Pulse 78    Temp 99.5 °F (37.5 °C)    Resp 19    Ht 5' 5\" (1.651 m)    Wt 83.2 kg (183 lb 6.8 oz)    SpO2 100%    BMI 30.52 kg/m2       O2 Device: Ventilator   O2 Flow Rate (L/min): 3 l/min   Temp (24hrs), Av.6 °F (37 °C), Min:96.7 °F (35.9 °C), Max:99.7 °F (37.6 °C)       Intake/Output:   Last shift:         Last 3 shifts: 10/25 1901 - 10/27 0700  In: 1960.3 [I.V.:135.3]  Out: 800     Intake/Output Summary (Last 24 hours) at 10/27/17 0927  Last data filed at 10/27/17 0200   Gross per 24 hour   Intake          1130.25 ml   Output              750 ml   Net           380.25 ml      serosanguinous    Ventilator Settings:  Ventilator  Mode: Assist control, Volume control  Respiratory Rate  Back-Up Rate: 15  Insp Time (sec): 1 sec  Insp Flow (l/min): 41 l/min  I:E Ratio: 1:3.0  Ventilator Volumes  Vt Set (ml): 375 ml  Vt Exhaled (Machine Breath) (ml): 396 ml  Vt Spont (ml): 1114 ml  Ve Observed (l/min): 9.2 l/min  Ventilator Pressures  PIP Observed (cm H2O): 28 cm H2O  Plateau Pressure (cm H2O): 20 cm H2O  MAP (cm H2O): 12  PEEP/VENT (cm H2O): 5 cm H20  Auto PEEP Observed (cm H2O): 0 cm H2O    Physical Exam:    General: Intubated, Awake and interactive   HEENT:  Anicteric sclerae; pink palpebral conjunctivae; mucosa moist  Resp:  Symmetrical chest expansion, no accessory muscle use; good airway entry, coarse breath sounds BL  CV:  S1, S2 present; regular rate and rhythm  GI:  Abdomen soft, non-tender; (+) active bowel sounds  Extremities:  +2 pulses on all extremities; no edema/ cyanosis/ clubbing noted  Skin:  Warm; no rashes/ lesions noted  Neurologic:  Non-focal, follows commands  Devices:  OGT, R femoral HD cath ETT, L chest tube to suction      DATA:     Current Facility-Administered Medications   Medication Dose Route Frequency    [START ON 10/28/2017] Vancomycin random level  1 Each Other Rx Dosing/Monitoring    heparin (porcine) injection 5,000 Units  5,000 Units SubCUTAneous Q8H    polyethylene glycol (MIRALAX) packet 17 g  17 g Oral DAILY    0.9% sodium chloride infusion 250 mL  250 mL IntraVENous PRN    epoetin windy (EPOGEN;PROCRIT) injection 2,000 Units  2,000 Units IntraVENous Q TUE, THU & SAT    PHENYLephrine 90,000 mcg in 0.9% sodium chloride 250 ml (NEOSYNEPHRINE) (premix or compounded) infusion   mcg/min IntraVENous TITRATE    fentaNYL citrate (PF) injection 25-50 mcg  25-50 mcg IntraVENous Q4H PRN    chlorhexidine (PERIDEX) 0.12 % mouthwash 10 mL  10 mL Oral BID    glucose chewable tablet 16 g  4 Tab Oral PRN    glucagon (GLUCAGEN) injection 1 mg  1 mg IntraMUSCular PRN    dextrose (D50W) injection syrg 12.5-25 g  25-50 mL IntraVENous PRN    midazolam (VERSED) injection 1-2 mg  1-2 mg IntraVENous Q4H PRN    cefepime (MAXIPIME) 500 g in 0.9% sodium chloride 100 mL IVPB  500 g IntraVENous Q24H    famotidine (PF) (PEPCID) 20 mg in sodium chloride 0.9 % 10 mL injection  20 mg IntraVENous Q24H    0.9% sodium chloride infusion  100 mL/hr IntraVENous DIALYSIS PRN    doxercalciferol (HECTOROL) 4 mcg/2 mL injection 6 mcg  6 mcg IntraVENous DIALYSIS TUE, THU & SAT    alteplase (CATHFLO) 2 mg in sterile water (preservative free) 2 mL injection  2 mg InterCATHeter ONCE PRN    heparin (porcine) 1,000 unit/mL injection 1,000 Units  1,000 Units InterCATHeter DIALYSIS PRN    VANCOMYCIN INFORMATION NOTE   Other Rx Dosing/Monitoring    diphenhydrAMINE (BENADRYL) injection 12.5 mg  12.5 mg IntraVENous Q4H PRN    ondansetron (ZOFRAN) injection 4 mg  4 mg IntraVENous Q4H PRN    acetaminophen (TYLENOL) suppository 650 mg  650 mg Rectal Q4H PRN    albuterol-ipratropium (DUO-NEB) 2.5 MG-0.5 MG/3 ML  3 mL Nebulization Q6H RT         Labs: Results:       Chemistry Recent Labs      10/27/17   0131  10/26/17   0200  10/25/17   0241   GLU  89  75  107*   NA  137  137  136   K  3.7  3.3*  3.6   CL  108  107  107   CO2  22  22  20*   BUN  25*  40*  26*   CREA  3.76*  5.17*  4.18*   CA  8.0*  8.6  8.1*   AGAP  7  8  9   BUCR  7*  8*  6*      CBC w/Diff Recent Labs      10/27/17   0131  10/26/17   0200  10/25/17   0241   WBC  8.2  8.4  10.4   RBC  2.97*  2.70*  2.78*   HGB  9.7*  8.9*  9.3*   HCT  29.8*  26.8*  27.3*   PLT  158  162  145   GRANS  86*  76*  84*   LYMPH  6*  12*  2*   EOS  1  2  2      Coagulation No results for input(s): PTP, INR, APTT in the last 72 hours. No lab exists for component: INREXT    Liver Enzymes No results for input(s): TP, ALB, TBIL, AP, SGOT, GPT in the last 72 hours.     No lab exists for component: DBIL   ABG Lab Results   Component Value Date/Time PHI 7.378 10/27/2017 05:26 AM    PCO2I 38.6 10/27/2017 05:26 AM    PO2I 115 (H) 10/27/2017 05:26 AM    HCO3I 22.7 10/27/2017 05:26 AM    FIO2I 30 10/27/2017 05:26 AM      Microbiology Recent Labs      10/24/17   1130   CULT  NO GROWTH 3 DAYS        Cultures w/ no growth to date    Telemetry: []Sinus []A-flutter [x]Paced    []A-fib []Multiple PVCs                    Imaging:  CXR 10/27:  1. Stable left pneumothorax. Left chest tube remains in similar position.  -Lines and tubes otherwise, as above. 2. Persistent opacities throughout the left lung, possibly atelectasis. Superimposed infiltrate not entirely excluded. 3. Stable enlargement of the cardiac silhouette. Mild pulmonary vascular  Congestion    [x]I have personally reviewed the patients radiographs  [x]Radiographs reviewed with radiologist        IMPRESSION:   76 yo female with worsening dyspena, L pleural effusion s/p chest tube w/ incomplete expansion of L lung and persistent PTX  · Acute repiratory failure- was difficult intubation, on mech ventilation  · L pleural effusion now w/ persistent PTX despite chest tube- CT surgery following, awaiting improved hemodynamics prior to intervention. On empiric abx, though cultures neg at this point, no leukocytosis  · ESRD on HD TTS, anuric  · Hypotensive- currently on neosynephrine  · Hypophosphatemia- 1.7  · H/o 2nd deg heart block s/p pacemaker. ECHO c/w hypertrophic cardiomyopathy- not currently on beta blocker due to hypotension w/ need for pressors        PLAN:   · Resp -  Continue mech vent, advance ETT 1.5 cm, pulm hygiene; FiO2 30%, PEEP5, , rate 15, Peak pressure 36, duo-nebs.  Possible VAT per CT surgery if pt stabilizes medically, continue chest tube to to 30cm suction, daily CXR, wean from vent as able  · ID - continue cefepime and vanc  · CVS - continue neosynephrine for BP control, wean as able  · Heme/onc -   Continue to monitor  · Metabolic - replete phos, continue to monitor electrolytes  · Renal - anuric, continue HD TTS per nephrology  · Endocrine - continue to monitor  · Neuro/ Pain/ Sedation - fentanyl prn  · GI - continue tube feeds with nepro @45, miralax  · Prophylaxis - DVT, GI  · Discussed in interdisciplinary rounds          The patient is: [] acutely ill Risk of deterioration: [x] moderate    [x] critically ill  [] high       My assessment/plan was discussed with:  [x]nursing []PT/OT    []respiratory therapy [x]Dr. Margaux Haskins   []family []     [x]Total critical care time exclusive of procedures   30    minutes    Yayo Garland MD

## 2017-10-27 NOTE — PROGRESS NOTES
attended the interdisciplinary rounds for [facility], who is a 75 y.o.,female. Patients Primary Language is: Marilyn Britton. According to the patients EMR Denominational Affiliation is: [facility].     The reason the Patient came to the hospital is:   Patient Active Problem List    Diagnosis Date Noted    Acute respiratory failure ([address] 10/21/2017    Pleural effusion 10/21/2017    ESRD on dialysis ([address] 10/21/2017    ESRD (end stage renal disease) ([address] 10/21/2017    Respiratory failure ([address] 10/21/2017    Altered mental status 10/21/2017    Clotted dialysis access ([address] 09/21/2017    Arteriovenous fistula thrombosis ([address] 09/21/2017    DVT (deep venous thrombosis) ([address] 02/17/2016    High-grade AV block 02/17/2016    Essential hypertension 02/17/2016    End-stage renal disease needing dialysis ([address] 09/29/2015    Vomiting 02/03/2015    Diarrhea 02/03/2015    Abdominal pain 02/03/2015    Cough 02/03/2015    Myalgia 02/03/2015    Gastroenteritis 02/03/2015    HCVD (hypertensive cardiovascular disease) 10/15/2014    Second degree heart block 10/15/2014    Near syncope 09/02/2014    Renal failure 06/26/2014    UTI (urinary tract infection) 06/26/2014    Anemia requiring transfusions 06/26/2014    End stage renal failure untreated by renal replacement therapy ([address] 06/26/2014      Plan:  Chaplains will continue to follow and will provide pastoral care on an as needed/requested basis.  recommends bedside caregivers page  on duty if patient shows signs of acute spiritual or emotional distress.     [address] [employer]  Board Certified [address] [facility]   [phone]

## 2017-10-27 NOTE — ROUTINE PROCESS
Bedside, Verbal and Written shift change report given to fareed (oncoming nurse) by Brionna Escobedo rn (offgoing nurse). Report included the following information SBAR, Kardex, ED Summary, OR Summary, Procedure Summary, Intake/Output, MAR, Accordion, Recent Results and Med Rec Status.

## 2017-10-27 NOTE — PROGRESS NOTES
[facility]      Chart and images reviewed. Remains on haroon 35. Awake and interactive on vent. 180 ml drainage from left chest tube since 0700. Left chest tube dressing clean and dry. PLAN:  Continue chest tube on suction. OR for decortication when medically stable. Discussed with family in room. CARDIOTHORACIC ATTENDING STAFF NOTE    Patient resting comfortably in bed, awake and alert. No significant events in past 24 hours. Remains on phenylephrine for BP support. Denies chest pain. Notes mild pain at chest tube site. Stable VS.     Air leak not present. Drainage from chest tube last 24 hours: 50 cc, serosanguinous in nature. Hct 26  WBC 8.4  K+ 3.3  creat 5.1    CXR unchanged in appearance, still with pneumothorax present on left. Discussed case with PA on our service. Continue present care, chest tube to suction for now. Discussed with patient and family who were in attendance. We will await stabilization of hemodynamics off pressors before proceeding with decortication.     Fang Haines MD

## 2017-10-28 NOTE — PROGRESS NOTES
RENAL DAILY PROGRESS NOTE      IMPRESSION:   ESRD, TTS schedule, dur for HD today   Anemia, on epo during HD  Access; left arm fistula,   Hypotension, on low dose haroon  Elevated PTH, on  hectrol during HD   pleural effusion, hydroneumothorax s/p chest tube placement   PLAN:    DC iv fluid   Adjust all meds per current renal function status. HD with 3K bath UF goal 1 L as tolerated , time 3.5hr    At 11:00 AM on 10/28/2017, I saw and examined patient during hemodialysis treatment. The patient was receiving hemodialysis for treatment of  renal failure. I have also reviewed vital signs, intake and output, lab results and recent events, and agreed with today's dialysis order. HD rounding    Blood pressure 98/48, pulse 84, temperature 98.5 °F (36.9 °C), temperature source Axillary, resp. rate 15, height 5' 5\" (1.651 m), weight 83 kg (182 lb 15.7 oz), SpO2 100 %.   Temp (24hrs), Av.9 °F (37.2 °C), Min:98.5 °F (36.9 °C), Max:99.5 °F (37.5 °C)      Blood Pressure: BP: 98/48  Pulse: Pulse (Heart Rate): 84  Temp:  Temp: 98.5 °F (36.9 °C)    Artificial Kidney Dialyzer/Set Up Inspection: Revaclear   hours Duration of Treatment (hours): 2.5 hours   Heparin Bolus Heparin Bolus (units): 0 units  Blood flow rate Blood Flow Rate (ml/min): 300 ml/min   Dialysate rate     Arterial Access Pressure Arterial Access Pressure (mmHg): -140  Venous Return Pressure Venous Return Pressure (mmHg): 150  Ultrafiltration Rate Goal/Amount of Fluid to Remove (mL): 1000 mL  Fluid Removal Fluid Removed (mL): 400  Net Fluid Removal NET Fluid Removed (mL): 600 ml                Subjective:     69y F with ESRD  Complaint:  Overnight events noted  Remain intubated, on neosynephrine       Current Facility-Administered Medications   Medication Dose Route Frequency    cefepime (MAXIPIME) 0.5 g in 0.9% sodium chloride 100 mL IVPB  0.5 g IntraVENous Q24H    heparin (porcine) injection 5,000 Units  5,000 Units SubCUTAneous Q8H    polyethylene glycol (MIRALAX) packet 17 g  17 g Oral DAILY    0.9% sodium chloride infusion 250 mL  250 mL IntraVENous PRN    epoetin windy (EPOGEN;PROCRIT) injection 2,000 Units  2,000 Units IntraVENous Q TUE, THU & SAT    PHENYLephrine 90,000 mcg in 0.9% sodium chloride 250 ml (NEOSYNEPHRINE) (premix or compounded) infusion   mcg/min IntraVENous TITRATE    fentaNYL citrate (PF) injection 25-50 mcg  25-50 mcg IntraVENous Q4H PRN    chlorhexidine (PERIDEX) 0.12 % mouthwash 10 mL  10 mL Oral BID    glucose chewable tablet 16 g  4 Tab Oral PRN    glucagon (GLUCAGEN) injection 1 mg  1 mg IntraMUSCular PRN    dextrose (D50W) injection syrg 12.5-25 g  25-50 mL IntraVENous PRN    midazolam (VERSED) injection 1-2 mg  1-2 mg IntraVENous Q4H PRN    famotidine (PF) (PEPCID) 20 mg in sodium chloride 0.9 % 10 mL injection  20 mg IntraVENous Q24H    0.9% sodium chloride infusion  100 mL/hr IntraVENous DIALYSIS PRN    doxercalciferol (HECTOROL) 4 mcg/2 mL injection 6 mcg  6 mcg IntraVENous DIALYSIS TUE, THU & SAT    alteplase (CATHFLO) 2 mg in sterile water (preservative free) 2 mL injection  2 mg InterCATHeter ONCE PRN    heparin (porcine) 1,000 unit/mL injection 1,000 Units  1,000 Units InterCATHeter DIALYSIS PRN    diphenhydrAMINE (BENADRYL) injection 12.5 mg  12.5 mg IntraVENous Q4H PRN    ondansetron (ZOFRAN) injection 4 mg  4 mg IntraVENous Q4H PRN    acetaminophen (TYLENOL) suppository 650 mg  650 mg Rectal Q4H PRN    albuterol-ipratropium (DUO-NEB) 2.5 MG-0.5 MG/3 ML  3 mL Nebulization Q6H RT       Review of Symptoms: intubated   Objective:     Patient Vitals for the past 24 hrs:   Temp Pulse Resp BP SpO2   10/28/17 1045 - 84 15 98/48 -   10/28/17 1030 - 88 11 102/42 100 %   10/28/17 1015 - 88 18 112/44 100 %   10/28/17 1000 - 87 18 103/47 100 %   10/28/17 0945 98.5 °F (36.9 °C) 80 10 101/41 100 %   10/28/17 0757 98.9 °F (37.2 °C) - - - -   10/28/17 0731 - 88 19 - 100 %   10/28/17 0700 - 77 19 96/47 100 %   10/28/17 0600 - 68 15 (!) 79/39 100 %   10/28/17 0530 - 72 18 98/47 99 %   10/28/17 0500 - 72 15 114/55 100 %   10/28/17 0430 - 69 16 121/47 100 %   10/28/17 0400 - 72 15 92/40 100 %   10/28/17 0336 - - 15 - -   10/28/17 0330 - 73 16 97/41 100 %   10/28/17 0300 - 75 15 (!) 86/43 100 %   10/28/17 0230 - 73 15 (!) 105/34 100 %   10/28/17 0200 - 79 16 122/45 100 %   10/28/17 0134 - - - - 100 %   10/28/17 0100 - 70 15 (!) 107/39 100 %   10/28/17 0030 - 73 18 109/54 100 %   10/28/17 0000 99.3 °F (37.4 °C) 82 16 100/61 100 %   10/27/17 2330 - 71 15 94/48 100 %   10/27/17 2310 - 75 20 - 100 %   10/27/17 2300 - 72 15 (!) 114/39 100 %   10/27/17 2230 - 75 15 129/53 100 %   10/27/17 2200 - 75 16 127/61 100 %   10/27/17 2130 - 92 19 128/55 100 %   10/27/17 2100 - 92 22 112/42 100 %   10/27/17 2030 - 77 15 107/57 100 %   10/27/17 2000 98.6 °F (37 °C) 69 21 95/45 100 %   10/1950 98.6 °F (37 °C) - - - -   10/27/17 1944 - - 23 - 97 %   10/27/17 1930 - 66 16 102/58 100 %   10/27/17 1900 - 71 16 106/47 100 %   10/27/17 1830 - 80 19 106/53 100 %   10/27/17 1800 - (!) 101 26 118/83 93 %   10/27/17 1730 - 77 18 108/73 100 %   10/27/17 1706 - 76 21 - 100 %   10/27/17 1700 - 69 16 112/62 100 %   10/27/17 1630 - 78 18 108/57 100 %   10/27/17 1600 - 69 20 95/52 100 %   10/27/17 1541 99.5 °F (37.5 °C) - - - -   10/27/17 1530 - 77 19 (!) 79/50 99 %   10/27/17 1500 - 69 15 127/62 100 %   10/27/17 1430 - 76 19 122/66 100 %   10/27/17 1400 - 77 21 116/58 100 %   10/27/17 1330 - 74 18 118/55 100 %   10/27/17 1300 - 75 19 109/58 100 %   10/27/17 1230 - 85 24 135/73 100 %   10/27/17 1224 - 74 17 - 100 %   10/27/17 1200 - 71 21 131/72 100 %   10/27/17 1135 98.8 °F (37.1 °C) - - - -   10/27/17 1130 - 75 17 124/65 100 %   10/27/17 1100 - 73 15 113/62 100 %        Weight change:      10/26 1901 - 10/28 0700  In: 4754.5 [I.V.:2549.5]  Out: 1225     Intake/Output Summary (Last 24 hours) at 10/28/17 1059  Last data filed at 10/28/17 0800 Gross per 24 hour   Intake           3370.6 ml   Output              325 ml   Net           3045.6 ml     Physical Exam:   General: intubated   HEENT  no thyromegaly  CVS: S1S2 heard,  no rub  RS: + air entry b/l,   Abd: Soft, Non tender,   Neuro: non focal, awake  Extrm: ++ edema, no cyanosis, clubbing   Skin: no visible  Rash  Access: left UE fistula, + thrill         Data Review:     LABS:   Hematology:   Recent Labs      10/28/17   0315  10/27/17   0131  10/26/17   0200   WBC  8.1  8.2  8.4   HGB  8.8*  9.7*  8.9*   HCT  27.1*  29.8*  26.8*     Chemistry:   Recent Labs      10/28/17   0315  10/27/17   0131  10/26/17   0200   BUN  39*  25*  40*   CREA  4.79*  3.76*  5.17*   CA  8.5  8.0*  8.6   ALB  2.0*   --    --    K  3.7  3.7  3.3*   NA  140  137  137   CL  110*  108  107   CO2  23  22  22   PHOS   --   1.7*  2.2*   GLU  90  89  75              Procedures/imaging: see electronic medical records for all procedures, Xrays and details which were not copied into this note but were reviewed prior to creation of Plan          Assessment & Plan:     As above         Fransisco Ojeda MD  10/28/2017  3:17 PM

## 2017-10-28 NOTE — PROGRESS NOTES
Dr. Sinan Mckeon saw pt and family again, pt is communicating that she wants to stop care, and have compassionate extubation,spoke with pastoral care, he is at [facility] but will try to get over to [facility]

## 2017-10-28 NOTE — ROUTINE PROCESS
Bedside shift change report given to [address] (oncoming nurse) by Shelly Browne (offgoing nurse). Report included the following information SBAR, Kardex, Intake/Output and MAR.

## 2017-10-28 NOTE — PROGRESS NOTES
conducted a Follow up consultation and Spiritual Assessment for Hugh Griffin, who is a 75 y.o.,female. The  provided the following Interventions:  Continued the relationship of care and support. Listened empathically. Offered prayer and assurance of continued prayer on patients behalf. Chart reviewed. The following outcomes were achieved:  Patient expressed gratitude for 's visit. Assessment:  There are no spiritual or [employer] issues which require intervention at this time. Plan:  Chaplains will continue to follow and will provide pastoral care on an as needed/requested basis.  recommends bedside caregivers page  on duty if patient shows signs of acute spiritual or emotional distress. Janet Rodriguez M.Div.   [facility] [address]  [phone]

## 2017-10-28 NOTE — ROUTINE PROCESS
Bedside and Verbal shift change report given to Amanda (oncoming nurse) by Maryam Wall (offgoing nurse). Report included the following information SBAR, Kardex and MAR.

## 2017-10-28 NOTE — PROGRESS NOTES
[facility] - Progress Note  Admit Date: 10/21/2017    Assessment:     -Acute hypercarbic respiratory failure with respiratory acidosis, intubated  -NSVT in setting of respiratory failure, PTX, pleural effusion  -Hypertrophic cardiomyopathy on echo 10/23/2017: markedly increased LV wall thickness with dynamic obstruction at rest and mid-cavity obliteration and peak gradient 38 mmHg.  -Echo 10/2017: small LV cavity, EF 65-70%, no obvious wall motion abnormalities, mildly dilated LA/RA, moderate MS  -Hx high-grade 2nd degree AV block.  Pt with documented Mobitz type II, second-degree AV block and 2:1 AV block with near syncope. -S/p PPM placement - [employer] Adapta [id number]  -Large left pleural effusion, cardiothoracic surgery following  -Hydropneumothorax, being followed by cardiothoracic surgery, who plans on performing lung decortication once more hemodynamically stable/off pressors  -Hx HTN  -ESRD, on HD T/R/S   -Anemia of chronic disease  -Secondary hyperparathyroidism of CKD  -Hx upper extremity DVT  -Quadriplegic, bed bound   -History of CVA with right sided weakness   -HCVD with severe LVH and normal EF 65% 2011, hyperdynamic LF function 75-80% on echo this admission   -Hx sleep apnea, but apparently improved with wt loss  -DNR status      Primary cardiologist is Dr. Gerry Hopkins:     Still on phenylephrine. Plan to keep LVEDP high if possible to prevent obstruction and try to decrease need for phenylephrine. Subjective:     Still on Mika, getting HD.        Objective:      Patient Vitals for the past 8 hrs:   Temp Pulse Resp BP SpO2   10/28/17 1045 - 84 15 98/48 -   10/28/17 1030 - 88 11 102/42 100 %   10/28/17 1015 - 88 18 112/44 100 %   10/28/17 1000 - 87 18 103/47 100 %   10/28/17 0945 98.5 °F (36.9 °C) 80 10 101/41 100 %   10/28/17 0757 98.9 °F (37.2 °C) - - - -   10/28/17 0731 - 88 19 - 100 %   10/28/17 0700 - 77 19 96/47 100 %   10/28/17 0600 - 68 15 (!) 79/39 100 %   10/28/17 0530 - 72 18 98/47 99 %   10/28/17 0500 - 72 15 114/55 100 %   10/28/17 0430 - 69 16 121/47 100 %   10/28/17 0400 - 72 15 92/40 100 %   10/28/17 0336 - - 15 - -   10/28/17 0330 - 73 16 97/41 100 %         Patient Vitals for the past 96 hrs:   Weight   10/28/17 0827 83 kg (182 lb 15.7 oz)   10/26/17 2149 83.2 kg (183 lb 6.8 oz)   10/25/17 2300 82.9 kg (182 lb 12.2 oz)   10/25/17 0000 82 kg (180 lb 12.4 oz)                    Intake/Output Summary (Last 24 hours) at 10/28/17 1106  Last data filed at 10/28/17 0800   Gross per 24 hour   Intake           3325.6 ml   Output              325 ml   Net           3000.6 ml       Physical Exam:  General:  intubated  Neck:  nontender  Lungs:  decreased  Heart:  regular rate and rhythm, S1, S2 normal, no murmur, click, rub or gallop  Abdomen:  abdomen is soft without significant tenderness, masses, organomegaly or guarding  Extremities:  extremities normal, atraumatic, no cyanosis or edema    Data Review:     Labs: Results:       Chemistry Recent Labs      10/28/17   0315  10/27/17   0131  10/26/17   0200   GLU  90  89  75   NA  140  137  137   K  3.7  3.7  3.3*   CL  110*  108  107   CO2  23  22  22   BUN  39*  25*  40*   CREA  4.79*  3.76*  5.17*   CA  8.5  8.0*  8.6   MG   --   2.0   --    PHOS   --   1.7*  2.2*   AGAP  7  7  8   BUCR  8*  7*  8*   AP  142*   --    --    TP  5.9*   --    --    ALB  2.0*   --    --    GLOB  3.9   --    --    AGRAT  0.5*   --    --       CBC w/Diff Recent Labs      10/28/17   0315  10/27/17   0131  10/26/17   0200   WBC  8.1  8.2  8.4   RBC  2.68*  2.97*  2.70*   HGB  8.8*  9.7*  8.9*   HCT  27.1*  29.8*  26.8*   PLT  183  158  162   GRANS  75*  86*  76*   LYMPH  10*  6*  12*   EOS  2  1  2      Cardiac Enzymes No results found for: CPK, CK, CKMMB, CKMB, RCK3, CKMBT, CKNDX, CKND1, BONI, TROPT, TROIQ, SREEDHAR, TROPT, TNIPOC, BNP, BNPP   Coagulation No results for input(s): PTP, INR, APTT in the last 72 hours.     No lab exists for component: INREXT Lipid Panel Lab Results   Component Value Date/Time    Cholesterol, total 110 05/16/2011 09:50 AM    HDL Cholesterol 34 05/16/2011 09:50 AM    LDL, calculated 43.6 05/16/2011 09:50 AM    VLDL, calculated 32.4 05/16/2011 09:50 AM    Triglyceride 162 05/16/2011 09:50 AM    CHOL/HDL Ratio 3.2 05/16/2011 09:50 AM      BNP No results found for: BNP, BNPP, XBNPT   Liver Enzymes Recent Labs      10/28/17   0315   TP  5.9*   ALB  2.0*   AP  142*   SGOT  20      Digoxin    Thyroid Studies Lab Results   Component Value Date/Time    TSH 0.89 09/03/2014 07:40 AM          Signed By: Nguyen Rudd MD     October 28, 2017

## 2017-10-28 NOTE — ROUTINE PROCESS
here speaking with family and patient all family members including children allowed to visit with patient

## 2017-10-28 NOTE — ROUTINE PROCESS
A terminal extubation was ordered. Per the on coming nurse this will happen when family request so. The ventilator check will not be done due to respect for many family members in the patient room. I will administer quality respiratory care until properly relived.

## 2017-10-28 NOTE — PROGRESS NOTES
[facility]      Seen in room on dialysis. CXR with stable left hydropneumothorax. 260 ml serous drainage last 21 hours, no air leak on 30 suction. Remains on haroon 25 mcg. PLAN:  Continue chest tube to suction. OR when off pressors. CARDIOTHORACIC ATTENDING STAFF NOTE    Patient resting comfortably in bed, undergoing dialysis. No significant events in past 24 hours. Remains on pressors      505 cc serous fluid out chest tube last 24 hours. Hct 27, WBC 8K    K+ 3.7, creat 4.8    Discussed case with PA on our service. Will hold on surgery until BP better and off pressors. Continue present care, chest tube to suction for now.     Alexis Hernandez MD

## 2017-10-28 NOTE — ROUTINE PROCESS
Family.  writer and Dr. Maciej Buck present pt is communicating that she does not want surgery, wants the et tube out stating she is tired and is expressing wanting to be comfort care

## 2017-10-28 NOTE — PROGRESS NOTES
[facility]  Progress Note    Patient: Chung Arce Age: 75 y.o. : 1950 MR#: [id number] SSN: [id number]  Date: 10/28/2017     Subjective: no current complaints    Assessment/Plan:   1. Acute hypercarbic respiratory failure with respiratory acidosis -    Persistent left sided pneumonthorax, CTS had noted plans for surgery, however have been unable to get off pressor. 2.  ESRD on HD - nephrology following. Plan for dialysis 10/28. Cont Tues-Thurs - Sat schedule  3. Ventricular Tachycardia - in setting of acute illness; s/p PPM in   4. Anemia of chronic disease - cont procrit, stable, follow labs  5. Hypotension - titrate off pressor as able. Add low dose metoprolol as BP allows. 6.  Mild hyponatremia - resolved  7. Tremor - new onset since admission, in setting of acute illness; follow  8. SIRS in setting of pleural effusion - all cultures have remained negative, vancomycin discontinued. **discussed with Dr. Dagoberto Han and see conversation surround comfort goal.  Will be available to support patient and family at this time to aid in comfort level.       Case discussed with:  [x]Patient  [x]Family  [x]Nursing  []Case Management  DVT Prophylaxis:  []Lovenox  [x]Hep SQ  []SCDs  []Coumadin   []On Heparin gtt    Objective:   VS:   Visit Vitals    /59    Pulse 77    Temp 99.6 °F (37.6 °C)    Resp 16    Ht 5' 5\" (1.651 m)    Wt 83 kg (182 lb 15.7 oz)    SpO2 100%    BMI 30.45 kg/m2      Tmax/24hrs: Temp (24hrs), Av.9 °F (37.2 °C), Min:98.5 °F (36.9 °C), Max:99.6 °F (37.6 °C)      Intake/Output Summary (Last 24 hours) at 10/28/17 1907  Last data filed at 10/28/17 1509   Gross per 24 hour   Intake           2910.4 ml   Output              260 ml   Net           2650.4 ml     General: awake, intubated, follows commands, appears in no distress  HEENT: non icteric, ET/NGT in place  Neck: No JVD  Cardiovascular: RRR  C/L: bilateral vent breath sounds, no rales/wheezing. Abdomen: soft, + BS  Ext: Right femoral HD cath, no LE edema     Labs:    Recent Results (from the past 24 hour(s))   GLUCOSE, POC    Collection Time: 10/28/17  2:21 AM   Result Value Ref Range    Glucose (POC) 82 70 - 110 mg/dL   CBC WITH AUTOMATED DIFF    Collection Time: 10/28/17  3:15 AM   Result Value Ref Range    WBC 8.1 4.6 - 13.2 K/uL    RBC 2.68 (L) 4.20 - 5.30 M/uL    HGB 8.8 (L) 12.0 - 16.0 g/dL    HCT 27.1 (L) 35.0 - 45.0 %    .1 (H) 74.0 - 97.0 FL    MCH 32.8 24.0 - 34.0 PG    MCHC 32.5 31.0 - 37.0 g/dL    RDW 16.6 (H) 11.6 - 14.5 %    PLATELET 224 850 - 720 K/uL    MPV 10.2 9.2 - 11.8 FL    NEUTROPHILS 75 (H) 40 - 73 %    LYMPHOCYTES 10 (L) 21 - 52 %    MONOCYTES 13 (H) 3 - 10 %    EOSINOPHILS 2 0 - 5 %    BASOPHILS 0 0 - 2 %    ABS. NEUTROPHILS 6.1 1.8 - 8.0 K/UL    ABS. LYMPHOCYTES 0.8 (L) 0.9 - 3.6 K/UL    ABS. MONOCYTES 1.0 0.05 - 1.2 K/UL    ABS. EOSINOPHILS 0.2 0.0 - 0.4 K/UL    ABS. BASOPHILS 0.0 0.0 - 0.1 K/UL    DF AUTOMATED     METABOLIC PANEL, COMPREHENSIVE    Collection Time: 10/28/17  3:15 AM   Result Value Ref Range    Sodium 140 136 - 145 mmol/L    Potassium 3.7 3.5 - 5.5 mmol/L    Chloride 110 (H) 100 - 108 mmol/L    CO2 23 21 - 32 mmol/L    Anion gap 7 3.0 - 18 mmol/L    Glucose 90 74 - 99 mg/dL    BUN 39 (H) 7.0 - 18 MG/DL    Creatinine 4.79 (H) 0.6 - 1.3 MG/DL    BUN/Creatinine ratio 8 (L) 12 - 20      GFR est AA 11 (L) >60 ml/min/1.73m2    GFR est non-AA 9 (L) >60 ml/min/1.73m2    Calcium 8.5 8.5 - 10.1 MG/DL    Bilirubin, total 0.4 0.2 - 1.0 MG/DL    ALT (SGPT) 15 13 - 56 U/L    AST (SGOT) 20 15 - 37 U/L    Alk.  phosphatase 142 (H) 45 - 117 U/L    Protein, total 5.9 (L) 6.4 - 8.2 g/dL    Albumin 2.0 (L) 3.4 - 5.0 g/dL    Globulin 3.9 2.0 - 4.0 g/dL    A-G Ratio 0.5 (L) 0.8 - 1.7     POC G3    Collection Time: 10/28/17  5:38 AM   Result Value Ref Range    Device: VENT      FIO2 (POC) 0.30 %    pH (POC) 7.357 7.35 - 7.45      pCO2 (POC) 38.2 35.0 - 45.0 MMHG    pO2 (POC) 116 (H) 80 - 100 MMHG    HCO3 (POC) 21.4 (L) 22 - 26 MMOL/L    sO2 (POC) 98 (H) 92 - 97 %    Base deficit (POC) 4 mmol/L    Mode ASSIST CONTROL      Tidal volume 375 ml    Set Rate 15 bpm    PEEP/CPAP (POC) 5 cmH2O    PIP (POC) 30      Allens test (POC) YES      Total resp.  rate 16      Site RIGHT RADIAL      Specimen type (POC) ARTERIAL      Performed by Marci Yen     Volume control YES     GLUCOSE, POC    Collection Time: 10/28/17  6:39 AM   Result Value Ref Range    Glucose (POC) 100 70 - 110 mg/dL   GLUCOSE, POC    Collection Time: 10/28/17 11:28 AM   Result Value Ref Range    Glucose (POC) 87 70 - 110 mg/dL     Signed By: Dick Simms NP     October 28, 2017

## 2017-10-28 NOTE — ROUTINE PROCESS
Bedside and Verbal shift change report given to CASA Mena (oncoming nurse) by Lorena Rogers RN (offgoing nurse). Report included the following information SBAR, Kardex, MAR and Recent Results. SITUATION:    Code Status: DNR   Reason for Admission: Pleural effusion   Acute respiratory failure ([facility]   ESRD on dialysis ([facility]   Acute respiratory failure ([facility]   ESRD (end stage renal disease) ([facility]   Pleural effusion   Respiratory failure ([facility]   Altered mental status    [facility] day: 6   Problem List:       Hospital Problems  Date Reviewed: 4/17/2017          Codes Class Noted POA    Acute respiratory failure ([facility] ICD-10-CM: J96.00  ICD-9-CM: 518.81  10/21/2017 Unknown        Pleural effusion ICD-10-CM: J90  ICD-9-CM: 511.9  10/21/2017 Unknown        ESRD on dialysis [facility]) ICD-10-CM: N18.6, Z99.2  ICD-9-CM: 585.6, V45.11  10/21/2017 Unknown        ESRD (end stage renal disease) ([facility] ICD-10-CM: N18.6  ICD-9-CM: 585.6  10/21/2017 Unknown        Respiratory failure ([facility] ICD-10-CM: J96.90  ICD-9-CM: 518.81  10/21/2017 Unknown        Altered mental status ICD-10-CM: R41.82  ICD-9-CM: 780.97  10/21/2017 Unknown              BACKGROUND:    Past Medical History:   Past Medical History:   Diagnosis Date    Cardiac echocardiogram 09/03/2014    Sm LV cavity. Hyperdynamic LV function. EF 75-80%. No WMA. Severe conc LVH. RVSP 25-30 mmHg. Mod-marked LAE. Mild MR.  Carotid duplex 04/26/2005    No significant occlusive disease bilaterally.     Chronic kidney disease     Hypertension     Murmur 2005    Second degree AV block 2014    Medtronic dual-chamber permanent pacemaker    Stroke [facility])          Patient taking anticoagulants yes     ASSESSMENT:    Changes in Assessment Throughout Shift: able to wean Mika to 25mcg- pt remains alert, mouths words, uses letter board- lg amts oral secretions cont- very freq sx     Patient has Central Line: no Reasons if yes: n/a   Patient has Saunders Cath: no Reasons if yes: n/a      Last Vitals:     Vitals:    10/27/17 1930 10/27/17 1944 10[birth year]1949 10/27/17 2000   BP: 102/58   95/45   Pulse: 66   69   Resp: 16 23 21   Temp:   98.6 °F (37 °C)    TempSrc:       SpO2: 100% 97%  100%   Weight:       Height:            IV and DRAINS (will only show if present)   Peripheral IV 10/26/17 Right External jugular-Site Assessment: Clean, dry, & intact  Airway - Continuous Aspiration of Subglottic Secretions (CARMINA) Tube 10/22/17 Oral-Site Assessment: Clean, dry, & intact  [REMOVED] Peripheral IV 10/21/17 Right Antecubital-Site Assessment: Clean, dry, & intact  Hemodialysis Catheter -Site Assessment: Clean, dry, & intact  [REMOVED] Peripheral IV 10/22/17 Right Forearm-Site Assessment: Clean, dry, & intact  Orogastric Tube 10/23/17-Site Assessment: Clean, dry, & intact  [REMOVED] Peripheral IV 10/23/17 Right;Superior Arm-Site Assessment: Clean, Dry  Peripheral IV 10/24/17 Right;Upper Arm-Site Assessment: Clean, dry, & intact     WOUND (if present)   Wound Type:  none   Dressing present Dressing Present : Yes   Wound Concerns/Notes:  none     PAIN    Pain Assessment    Pain Intensity 1: 0 (10/27/17 1600)    Pain Location 1: Knee    Pain Intervention(s) 1: Medication (see MAR), Emotional support, Elevation, Relaxation technique, Repositioned    Patient Stated Pain Goal: 0  o Interventions for Pain:  See mar  o Intervention effective: yes  o Time of last intervention: 1347   o Reassessment Completed: yes      Last 3 Weights:  Last 3 Recorded Weights in this Encounter    10/25/17 0000 10/25/17 2300 10/26/17 2149   Weight: 82 kg (180 lb 12.4 oz) 82.9 kg (182 lb 12.2 oz) 83.2 kg (183 lb 6.8 oz)     Weight change: 0.3 kg (10.6 oz)     INTAKE/OUPUT    Current Shift:      Last three shifts: 10/26 0701 - 10/27 1900  In: 2634.1 [I.V.:699.1]  Out: 1030      LAB RESULTS     Recent Labs      10/27/17   0131  10/26/17   0200  10/25/17   0241   WBC  8.2  8.4  10.4   HGB  9.7*  8.9*  9.3* HCT  29.8*  26.8*  27.3*   PLT  158  162  145        Recent Labs      10/27/17   0131  10/26/17   0200  10/25/17   0241   NA  137  137  136   K  3.7  3.3*  3.6   GLU  89  75  107*   BUN  25*  40*  26*   CREA  3.76*  5.17*  4.18*   CA  8.0*  8.6  8.1*   MG  2.0   --    --        RECOMMENDATIONS AND DISCHARGE PLANNING     1. Pending tests/procedures/ Plan of Care or Other Needs: am labs, cxr, abgs- dialysis in am     2. Discharge plan for patient and Needs/Barriers: unk    3. Estimated Discharge Date: unk Posted on Whiteboard in Patients Room: no      4. The patient's care plan was reviewed with the oncoming nurse. \"HEALS\" SAFETY CHECK      Fall Risk    Total Score: 2    Safety Measures: Safety Measures: Bed/Chair-Wheels locked, Bed in low position, Call light within reach, Emergency bedside equipment    A safety check occurred in the patient's room between off going nurse and oncoming nurse listed above. The safety check included the below items  Area Items   H  High Alert Medications - Verify all high alert medication drips (heparin, PCA, etc.)   E  Equipment - Suction is set up for ALL patients (with sindy)  - Red plugs utilized for all equipment (IV pumps, etc.)  - WOWs wiped down at end of shift.  - Room stocked with oxygen, suction, and other unit-specific supplies   A  Alarms - Bed alarm is set for fall risk patients  - Ensure chair alarm is in place and activated if patient is up in a chair   L  Lines - Check IV for any infiltration  - Saunders bag is empty if patient has a Saunders   - Tubing and IV bags are labeled   S  Safety   - Room is clean, patient is clean, and equipment is clean. - Hallways are clear from equipment besides carts. - Fall bracelet on for fall risk patients  - Ensure room is clear and free of clutter  - Suction is set up for ALL patients (with sindy)  - Hallways are clear from equipment besides carts.    - Isolation precautions followed, supplies available outside room, sign posted     Kate Dan RN

## 2017-10-28 NOTE — PROGRESS NOTES
[facility]. Pulmonary, Critical Care, and Sleep Medicine    Name: Shania Reyes MRN: [id number]   : 1950 Hospital: [address] [place] [address] Dr   Date: 10/28/2017  Admission Date: 10/21/2017     76 yo woman with ESRD on dialysis, large L and small R pleural transudative effusions, with complete collapse of L lung due to compression from L effusion, s/p thoracentesis and pigtail chest catheter placement, with incomplete L lung expansion and pneumothorax. Persistent low BP readings have delayed surgery; patient remains ventilator dependent. Chest tube draining total 1120 cc. No air leak noted. Patient is arousable on the ventilator today. No air leak noted. No complaints. Family at bedside were updated on her condition and plans. Cultures remain negative to date. PALLIATIVE CARE DISCUSSION: When patient was informed about additional delay in surgery on AM rounds, and the possibility that she may not be a candidate since she has remained pressor dependent with no obvious remediable condition (i.e., she has chronic hypotension, noted for a long time in outpatient dialysis), she stated she does not want to be prolonged on life support (mechanical ventilation, intubation) while waiting for surgery that she is likely not to be a candidate for. Discussion ensued about withdrawal of life support, which she wants. It was explained that she may eexpire if she chooses that course, and she is ready for that. She would like comfort care, and to be kept comfortable after extubation. She favors a natural death, not on life support machines. Her children were with her at the time of this iscussion. She is of sound mind, and has been able to communicate with a letter board throughout her ICU stay, spelling out what she wants to say, with excellent accuracy and consistency.     She was given the rest of the day to confer with pastors, other family then asked the same question again, with her son present. She nodded yes again to if she wanted us to remove the ETT, and nodded no to if she wanted to fight and keep going until she could have surgery, even if that may take many days. We will therefore convert her to comfort care, and withdraw mechanical ventilation, pressors. I will keep chest tube in for now to minimize dyspnea and pain, but this may be discontinued later as well if it proves to be a burden. [x]The patient is critically ill on      [x]Mechanical ventilation [x]Pressors   []BiPAP []   Events and notes from last 24 hours reviewed. Care plan discussed on multidisciplinary rounds.     Patient Active Problem List   Diagnosis Code    Renal failure N19    UTI (urinary tract infection) N39.0    Anemia requiring transfusions D64.9    [facility] N18.6    Near syncope R55    HCVD (hypertensive cardiovascular disease) I11.9    Second degree heart block I44.1    Vomiting R11.10    Diarrhea R19.7    Abdominal pain R10.9    Cough R05    Myalgia M79.1    Gastroenteritis K52.9    End-stage renal disease needing dialysis ([facility] N18.6, Z99.2    DVT (deep venous thrombosis) ([facility]) I82.409    High-grade AV block I44.30    Essential hypertension I10    Clotted dialysis access [facility]) T82.49XA    Arteriovenous fistula thrombosis (HCC) J25.534Z    Acute respiratory failure ([facility]) J96.00    Pleural effusion J90    ESRD on dialysis ([facility]) N18.6, Z99.2    ESRD (end stage renal disease) ([facility]) N18.6    Respiratory failure ([facility]) J96.90    Altered mental status R41.82       Vital Signs:  Visit Vitals    /40    Pulse 87    Temp 99.6 °F (37.6 °C)    Resp 26    Ht 5' 5\" (1.651 m)    Wt 83 kg (182 lb 15.7 oz)    SpO2 100%    BMI 30.45 kg/m2             Temp (24hrs), Av.9 °F (37.2 °C), Min:98.5 °F (36.9 °C), Max:99.6 °F (37.6 °C)         Intake/Output Summary (Last 24 hours) at 10/28/17 1700  Last data filed at 10/28/17 1509   Gross per 24 hour   Intake          3223.46 ml   Output              260 ml   Net          2963.46 ml      Ventilator Settings:  Ventilator  Mode: Assist control, Volume control  Respiratory Rate  Back-Up Rate: 15  Insp Time (sec): 1 sec  Insp Flow (l/min): 41 l/min  I:E Ratio: 1:3.0  Ventilator Volumes  Vt Set (ml): 375 ml  Vt Exhaled (Machine Breath) (ml): 400 ml  Vt Spont (ml): 1114 ml  Ve Observed (l/min): 10 l/min  Ventilator Pressures  PIP Observed (cm H2O): 25 cm H2O  Plateau Pressure (cm H2O): 21 cm H2O  MAP (cm H2O): 13  PEEP/VENT (cm H2O): 5 cm H20  Auto PEEP Observed (cm H2O): 0 cm H2O  FIO2 40%, weaned to 35%.     Current Facility-Administered Medications   Medication Dose Route Frequency    cefepime (MAXIPIME) 0.5 g in 0.9% sodium chloride 100 mL IVPB  0.5 g IntraVENous Q24H    heparin (porcine) injection 5,000 Units  5,000 Units SubCUTAneous Q8H    polyethylene glycol (MIRALAX) packet 17 g  17 g Oral DAILY    epoetin windy (EPOGEN;PROCRIT) injection 2,000 Units  2,000 Units IntraVENous Q TUE, THU & SAT    PHENYLephrine 90,000 mcg in 0.9% sodium chloride 250 ml (NEOSYNEPHRINE) (premix or compounded) infusion   mcg/min IntraVENous TITRATE    chlorhexidine (PERIDEX) 0.12 % mouthwash 10 mL  10 mL Oral BID    famotidine (PF) (PEPCID) 20 mg in sodium chloride 0.9 % 10 mL injection  20 mg IntraVENous Q24H    doxercalciferol (HECTOROL) 4 mcg/2 mL injection 6 mcg  6 mcg IntraVENous DIALYSIS TUE, THU & SAT    albuterol-ipratropium (DUO-NEB) 2.5 MG-0.5 MG/3 ML  3 mL Nebulization Q6H RT       Physical Exam:   General: Intubated, Awake and alert   HEENT:  Anicteric sclerae; pink palpebral conjunctivae; mucosa moist  Resp:  Symmetrical chest expansion, EET in place on vent, equal breath sounds BL, no wheezes or rales  CV:  S1, S2 present; regular rate and rhythm  GI:  Abdomen soft, non-tender; (+) active bowel sounds  Extremities:  no edema/ cyanosis/ clubbing noted   Skin:  Warm; no rashes/ lesions noted, CT L chest wall  Neurologic:  Follows directions, A&O, communicates by nodding and spelling on a letter board, no focal deficits  Devices:  NGT, ETT, L chest tube, R femoral HD cath    DATA:    Labs:  Recent Labs      10/28/17   0315  10/27/17   0131  10/26/17   0200   WBC  8.1  8.2  8.4   HGB  8.8*  9.7*  8.9*   HCT  27.1*  29.8*  26.8*   PLT  183  158  162     Recent Labs      10/28/17   0315  10/27/17   0131  10/26/17   0200   NA  140  137  137   K  3.7  3.7  3.3*   CL  110*  108  107   CO2  23  22  22   GLU  90  89  75   BUN  39*  25*  40*   CREA  4.79*  3.76*  5.17*   CA  8.5  8.0*  8.6   MG   --   2.0   --    PHOS   --   1.7*  2.2*   ALB  2.0*   --    --    SGOT  20   --    --    ALT  15   --    --      Recent Labs      10/28/17   0538  10/27/17   0526  10/26/17   0451   FIO2I  0.30  30  30   HCO3I  21.4*  22.7  21.4*   PCO2I  38.2  38.6  37.4   PHI  7.357  7.378  7.365   PO2I  116*  115*  97     Pleural fluid analysis:  10/24 sample was consistent with non-infected exudate by LDH (my last interp was prior to availability of the LDH; which changed the character of the fluid to exudative, not transudative.)  Cytology was negative for malignancy, and remarkable only for inflammatory cells. Cultures are negative. Imaging:  [x]                           I have personally reviewed the patients radiographs and reports  CXR t10/28:  ET tube tip is about 5 cm above arvin. NG tube in the gastric fundus. Pigtail  catheter in the left chest. Pacemaker, stable. Mild cardiomegaly, stable. No  vascular congestion no significant consolidation. No pleural fluid. No  pneumothorax. High complexity decision making was performed during this consultation and evaluation. [x]       Pt is at high risk for further organ failure and dysfunction. Critical care time spent 50 minutes with patient, family, discussion with colleagues, exclusive of procedures. IMPRESSION:   · ESRD on dialysis TTS. Comfort care decision discussed with Dr. Jamie Kirk. · L pleural effusion, transudative, non-infected, resolved with chest tube drainage, still draining too much fluid to remove at this time if conventional therapy. · L lung with persistent PTX, now appears completely resolved and lung expanded, with CT to suction, not a candidate for surgery, but now may not need it. Message left with Dr. Jeanette Palomino service to inform him of patient's decision. · Hypotensive, on empiric vanco, pressors, fluids. · Acute respiratory failure, on mechanical ventilation, patient wishes to be extubated and have comfort care. · Paced rhythm, stable cardiac status  · GI; Obesity; on Nepro 20/hr  · ICU PPX      PLAN:   1. Monitor Culture results  2. Possible VAT per CT surgery if patient stabilizes medically  3. Continue chest tube to 30 cm suction for PTX, may try water seal tomorrow. 4.  Follow daily CXR  5. Vanco, cefipime  6. ESRD management per renal  7. Comfort care transition today  8.   Dispo: critically ill, with high risk of deterioration, multiple conditions presenting immediate threat to life; is on intensive support, being withdrawn         Kelley Schaffer MD   Pulmonary and critical care medicine  10/28/2017  5:29 PM

## 2017-10-28 NOTE — DIALYSIS
ACUTE HEMODIALYSIS FLOW SHEET    HEMODIALYSIS ORDERS: Physician: Myrna Braga     Dialyzer: Revaclear        Duration: 2.5 hr  BFR: 300   DFR: 600   Dialysate:  Temp 36 K+   3    Ca+  2.5 Na 140 Bicarb 30   Weight:   kg    Bed Scale []     Unable to Obtain []      Dry weight/UF Goal: 1000 Access RLE CVC  Needle Gauge     Heparin []  Bolus      Units    [] Hourly       Units    [x]None      Catheter locking solution heparin 1:1000   Pre BP:   104/51    Pulse:     84     Temperature:   98.5  Respirations: 18  Tx: NS       ml/Bolus  Other        [x] N/A   Labs: Pre        Post:        [x] N/A   Additional Orders(medications, blood products, hypotension management): Albumin, hectorol, epogen, phenylephrine     [] N/A     [x] [facility] Consent Verified     CATHETER ACCESS: []N/A   [x]Right   []Left   []IJ     [x]Fem   [] First use X-ray verified     [x]Tunnel                [] Non Tunneled   [x]No S/S infection  []Redness  []Drainage []Cultured []Swelling []Pain   [x]Medical Aseptic Prep Utilized   []Dressing Changed  [x] Biopatch  Date: 10/24/17      []Clotted   [x]Patent   Flows: [x]Good  []Poor  []Reversed   If access problem,  notified: []Yes    [x]N/A  Date:           GRAFT/FISTULA ACCESS:  [x]N/A     []Right     []Left     []UE     []LE   []AVG   []AVF        []Buttonhole    []Medical Aseptic Prep Utilized   []No S/S infection  []Redness  []Drainage []Cultured []Swelling []Pain    Bruit:   [] Strong    [] Weak       Thrill :   [] Strong    [] Weak       Needle Gauge:    Length:     If access problem,  notified: []Yes     []N/A  Date:        Please describe access if present and not used:       GENERAL ASSESSMENT:    LUNGS:  Rate 18 SaO2%  100      [] N/A    [] Clear  [x] Coarse  [] Crackles  [] Wheezing        [] Diminished     Location : [x]RLL   [x]LLL    [x][facility]UL  [x][facility]   Cough: []Productive  []Dry  [x]N/A   Respirations:  [x]Easy  []Labored   Therapy:  []RA  []NC  l/min    Mask: []NRB []Venti       O2% [x]Ventilator  []Intubated  [] Trach  [] BiPaP   CARDIAC: []Regular      [x] Irregular   [] Pericardial Rub  [] JVD        [x]  Monitored  [x] Bedside  [] Remotely monitored [] N/A  Rhythm:    EDEMA: [] None  [x]Generalized  [] Pitting [] 1    [] 2    [] 3    [] 4                 [] Facial  [] Pedal  []  UE  [] LE   SKIN:   [x] Warm  [] Hot     [] Cold   [x] Dry     [] Pale   [] Diaphoretic                  [] Flushed  [] Jaundiced  [] Cyanotic  [] Rash  [] Weeping   LOC:    [x] Alert      [x]Oriented:    [] Person     [] Place  []Time               [] Confused  [] Lethargic  [] Medicated  [] Non-responsive     GI / ABDOMEN:  [] Flat    [] Distended    [x] Soft    [] Firm   []  Obese                             [] Diarrhea  [x] Bowel Sounds  [] Nausea  [] Vomiting       / URINE ASSESSMENT:[] Voiding   [] Oliguria  [x] Anuria   []  Saunders     [] Incontinent    []  Incontinent Brief      []  Bathroom Privileges     PAIN: [x] 0 []1  []2   []3   []4   []5   []6   []7   []8   []9   []10            Scale 0-10  Action/Follow Up:    MOBILITY:  [] Amb    [] Amb/Assist    [x] Bed    [] Wheelchair  [] Stretcher      All Vitals and Treatment Details on Attached [address]: [facility]          Room # 305     [] 1st Time Acute  [] Stat  [x] Routine  [] Urgent     [x] Acute Room  []  Bedside  [] ICU/CCU  [] ER   Isolation Precautions:  [x] Dialysis   [] Airborne   [] Contact    [] Reverse   Special Considerations:         [] Blood Consent Verified [x]N/A     ALLERGIES: Shellfish derivatives  [] NKA          Code Status:  [] Full Code  [x] DNR  [] Other           HBsAg ONLY: Date Drawn 10/24/17         [x]Negative []Positive []Unknown   HBsAb: Date 10/24/17    [x] Susceptible   [] [id number] []Not Drawn  [] Drawn     Current Labs:    Date of Labs: Today [x]     Results for Emily Burt (MRN [id number]) as of 10/28/2017 10:20   Ref.  Range 10/28/2017 03:15   WBC Latest Ref Range: 4.6 - 13.2 K/uL 8.1 RBC Latest Ref Range: 4.20 - 5.30 M/uL 2.68 (L)   HGB Latest Ref Range: 12.0 - 16.0 g/dL 8.8 (L)   HCT Latest Ref Range: 35.0 - 45.0 % 27.1 (L)   MCV Latest Ref Range: 74.0 - 97.0 .1 (H)   MCH Latest Ref Range: 24.0 - 34.0 PG 32.8   MCHC Latest Ref Range: 31.0 - 37.0 g/dL 32.5   RDW Latest Ref Range: 11.6 - 14.5 % 16.6 (H)   PLATELET Latest Ref Range: 135 - 420 K/uL 183   MPV Latest Ref Range: 9.2 - 11.8 FL 10.2   NEUTROPHILS Latest Ref Range: 40 - 73 % 75 (H)   LYMPHOCYTES Latest Ref Range: 21 - 52 % 10 (L)   MONOCYTES Latest Ref Range: 3 - 10 % 13 (H)   EOSINOPHILS Latest Ref Range: 0 - 5 % 2   BASOPHILS Latest Ref Range: 0 - 2 % 0   DF Latest Units:   AUTOMATED   ABS. NEUTROPHILS Latest Ref Range: 1.8 - 8.0 K/UL 6.1   ABS. LYMPHOCYTES Latest Ref Range: 0.9 - 3.6 K/UL 0.8 (L)   ABS. MONOCYTES Latest Ref Range: 0.05 - 1.2 K/UL 1.0   ABS. EOSINOPHILS Latest Ref Range: 0.0 - 0.4 K/UL 0.2   ABS. BASOPHILS Latest Ref Range: 0.0 - 0.1 K/UL 0.0     Results for Mervin Omalley (MRN [id number]) as of 10/28/2017 10:20   Ref.  Range 10/28/2017 03:15   Sodium Latest Ref Range: 136 - 145 mmol/L 140   Potassium Latest Ref Range: 3.5 - 5.5 mmol/L 3.7   Chloride Latest Ref Range: 100 - 108 mmol/L 110 (H)   CO2 Latest Ref Range: 21 - 32 mmol/L 23   Anion gap Latest Ref Range: 3.0 - 18 mmol/L 7   Glucose Latest Ref Range: 74 - 99 mg/dL 90   BUN Latest Ref Range: 7.0 - 18 MG/DL 39 (H)   Creatinine Latest Ref Range: 0.6 - 1.3 MG/DL 4.79 (H)   BUN/Creatinine ratio Latest Ref Range: 12 - 20   8 (L)   Calcium Latest Ref Range: 8.5 - 10.1 MG/DL 8.5   GFR est non-AA Latest Ref Range: >60 ml/min/1.73m2 9 (L)   GFR est AA Latest Ref Range: >60 ml/min/1.73m2 11 (L)   Bilirubin, total Latest Ref Range: 0.2 - 1.0 MG/DL 0.4   Protein, total Latest Ref Range: 6.4 - 8.2 g/dL 5.9 (L)   Albumin Latest Ref Range: 3.4 - 5.0 g/dL 2.0 (L)   Globulin Latest Ref Range: 2.0 - 4.0 g/dL 3.9   A-G Ratio Latest Ref Range: 0.8 - 1.7   0.5 (L)   ALT (SGPT) Latest Ref Range: 13 - 56 U/L 15   AST Latest Ref Range: 15 - 37 U/L 20   Alk.  phosphatase Latest Ref Range: 45 - 117 U/L 142 (H)                                                                                                                                 DIET:  [] Renal    [x] Other     [x] NPO     []  Diabetic      PRIMARY NURSE REPORT: First initial/Last name/Title      Pre Dialysis: Evan Kenney RN    Time: 0915      EDUCATION:    [x] Patient [] Other         Knowledge Basis:SETH []None []Minimal [] Substantial   Barriers to learning Pt intubated but alert, nonverbal and soft wrist restraints in place so limited communication []N/A   [x] Access Care     [] S&S of infection     [] Fluid Management     []K+     [x]Procedural    []Albumin     [x] Medications     [] Tx Options     [] Transplant     [] Diet     [] Other   Teaching Tools:  [x] Explain  [] Demo  [] Handouts [] Video  Patient response:   [] Verbalized understanding  [] Teach back  [] Return demonstration [x] Requires follow up   Inappropriate due to            [x] Time Out/Safety Check       RO/HEMODIALYSIS MACHINE SAFETY CHECKS  Before each treatment:     Machine Number:                   [facility]                                  [] Unit Machine #  with centralized RO                                  [x] Portable Machine #1/RO serial # [id number]                                  [] Portable Machine #2/RO serial # [id number]                                  [] Portable Machine #3/RO serial # [id number]                                                                                                       [facility]                                  [] Portable Machine #11/RO serial # [id number]                                   [] Portable Machine #12/RO serial # [id number]                                  [] Portable Machine #13/RO serial #  [id number]      Alarm Test:  Pass time 0930         Other:         [x] RO/Machine Log Complete Temp    36            [x]Extracorporeal Circuit Tested for integrity   Dialysate: pH  7.2 Conductivity: Meter   14.2     HD Machine   14.2                  TCD: 14.0  Dialyzer Lot # [id number]            Blood Tubing Lot # [id number]          Saline Lot #  [id number]     CHLORINE TESTING-Before each treatment and every 4 hours    Total Chlorine: [x] less than 0.1 ppm  Time: 0930 4 Hr/2nd Check Time:    (if greater than 0.1 ppm from Primary then every 30 minutes from Secondary)     TREATMENT INITIATION  with Dialysis Precautions:   [x] All Connections Secured                 [x] Saline Line Double Clamped   [x] Venous Parameters Set                  [x] Arterial Parameters Set    [x] Prime Given  250                              [x]Air Foam Detector Engaged      Treatment Initiation Note: Pt resting in bed, alert, SETH orientation but pt cooperative. Intubated, soft wrist restraints in place, chest tube in place with pink tinged drainage. On 30 mcg/min phenylephrine drip. And Nepro tube feeds running at 45 ml/hour. VSS and in no acute distress. Verified consent for treatment. Assessed and accessed without complications. Treatment initiated vie right femoral CVC without complications. Will continue to monitor throughout treatment. 6094 Dr. Chato Schulz increased goal from 500 ml to 1000 ml. Will continue to monitor. 1010 Dr. Chato Schulz increased run time from 2.5 hours to 3.5 hours. Will continue to monitor. 1120 ICU team requesting that no fluid be removed today. Verified with Dr. Chato Schulz and stopped pulling fluid this treatment. Pt removed 668 ml at this point. 1130 Pt's BP 92/45. Gave 100 ml normal saline and rechecked BP. Now 101/44.      Medication Dose Volume Route Initials Dialyzer Cleared: [] Good [x] Fair  [] Poor    Blood processed:  61.2 L  UF Removed  0 Ml    Post Wt:     kg  POst BP:   91/49       Pulse: 70      Respirations: 18  Temperature: 98.9     hectorol 6 mcg 3 ml IV SN Post Tx Vascular Access: AVF/AVG: Bleeding stopped Art  min. Howard. Min   N/A     Epogen 2000 units 1 ml IV SN Catheter: Locking solution: Heparin 1:1000 Art. 2.3  Howard. 2.4  N/A                                 Post Assessment:                                    Skin:  [x] Warm  [x] Dry [] Diaphoretic    [] Flushed  [] Pale [] Cyanotic   [facility] Signatures Title Initials  Time Lungs: [] Clear    [x] Course  [] Crackles  [] Wheezing [] Diminished   [place] Poag RN SN  Cardiac: [] Regular   [x] Irregular   [x] Monitor  [] N/A  Rhythm:           Edema:  [] None    [x] General     [] Facial   [] Pedal    [] UE    [] LE       Pain: [x]0  []1  []2   []3  []4   []5   []6   []7   []8   []9   []10         Post Treatment Note: Pt completed treatment with intermittent episodes of hypotension. Otherwise, VSS. All blood returned via femoral CVC. Pt alert, on vent, in no acute distress at handoff.       POST TREATMENT PRIMARY NURSE HANDOFF REPORT:     First initial/Last name/Title         Post Dialysis: Rosemarie Curtis RN Time:  80     Abbreviations: AVG-arterial venous graft, AVF-arterial venous fistula, IJ-Internal Jugular, Subcl-Subclavian, Fem-Femoral, Tx-treatment, AP/HR-apical heart rate, DFR-dialysate flow rate, BFR-blood flow rate, AP-arterial pressure, -venous pressure, UF-ultrafiltrate, TMP-transmembrane pressure, Howard-Venous, Art-Arterial, RO-Reverse Osmosis

## 2017-10-29 NOTE — PROGRESS NOTES
responded to Death of  Junaid Thomas, who was a 75 y.o.,female,     The  provided the following Interventions for Patient Family:  Provided crisis pastoral care, pastoral support and grief interventions. Offered prayers on behalf of the patient family. Chart reviewed. Plan:  Chaplains will continue to follow and will provide pastoral care on an as needed/requested basis and grief support for the family. Estuardo Bray M.Div.   [facility] [address]  [phone]

## 2017-10-30 LAB
ABO + RH BLD: NORMAL
BLD PROD TYP BPU: NORMAL
BLOOD GROUP ANTIBODIES SERPL: NORMAL
BPU ID: NORMAL
CALLED TO:,BCALL1: NORMAL
CROSSMATCH RESULT,%XM: NORMAL
SPECIMEN EXP DATE BLD: NORMAL
STATUS OF UNIT,%ST: NORMAL
UNIT DIVISION, %UDIV: 0

## 2017-10-30 NOTE — DISCHARGE SUMMARY
[facility]  [facility]       Patient: Isac Potts Age: 75 y.o. : 1950 MR#: [id number] SSN: [id number]  PCP on record: Fabio Arteaga MD  Admit date: 10/21/2017  Discharge date: 10/29/2017    Consults:    - Cardiology  - Cardiothoracic Surgery  - Pulmonary / Critical Care  - Nephrology    Procedures:  -   N/A  Significant Diagnostic Studies:   CT CHEST WO CONT on 10/24/2017:  -  Impression:     Persistent moderate to large left pneumothorax with pigtail catheter in place. There is also a small volume of left pleural effusion remaining.     Extensive incomplete expansion of the left upper lobe and left lower lobe with  opacities in periphery of both the left upper and left lower lobes. There is  likely a component of volume loss in these opacities with a stellate appearance  of the bronchovascular bundles suggesting predominantly volume loss, possibly  rounded atelectasis or scar. However, underlying mass remains difficult to  entirely exclude particularly in the left upper lobe with a more focal rounded  area measuring 1.8 x 1.3 cm. Continued follow-up is advised. PET/CT could be  utilized for further imaging evaluation.     Small to moderate right pleural effusion with associated atelectasis and/or  infiltrates.     Prominent precarinal lymph node     Diffuse thickening of the adrenal glands, left greater than right suggesting  adrenal hyperplasia.     Atrophic kidneys and prior cholecystectomy. CTA CHEST W OR W WO CONT on 10/21/2017  IMPRESSION:      Nondiagnostic for PE. Please see above. .      Large left effusion, and resultant near complete collapse of the left lung. Small to moderate right effusion and partial collapse of the dependent right  lower lobe.     Global cardiomegaly with significant enlargement of the right atrium. End-stage  kidney disease.     Diagnoses at time of death:                                           Patient Active Problem List   Diagnosis Code    Renal failure N19    UTI (urinary tract infection) N39.0    Anemia requiring transfusions D64.9    End stage renal failure untreated by renal replacement therapy ([facility] N18.6    Near syncope R55    HCVD (hypertensive cardiovascular disease) I11.9    Second degree heart block I44.1    Vomiting R11.10    Diarrhea R19.7    Abdominal pain R10.9    Cough R05    Myalgia M79.1    Gastroenteritis K52.9    End-stage renal disease needing dialysis ([facility] N18.6, Z99.2    DVT (deep venous thrombosis) ([facility]) I82.409    High-grade AV block I44.30    Essential hypertension I10    Clotted dialysis access [facility]) T82.49XA    Arteriovenous fistula thrombosis ([facility]) T07.644E    Acute respiratory failure ([facility]) J96.00    Pleural effusion J90    ESRD on dialysis ([facility] N18.6, Z99.2    ESRD (end stage renal disease) ([facility]) N18.6    Respiratory failure ([facility]) J96.90    Altered mental status R41.82     Hospital Course by Problem     1. Acute hypercarbic respiratory failure with respiratory acidosis - Patient was admitted with findings of very large left sided pleural effusion which was initially treated with bipap. Despite aggressive management patient had further decline and ultimately intubated on 10/22/2017 and chest tube was placed. There had been discussions of surgical decortication but this was held as patient remained on pressors. Level of alertness improved with but overall condition did not improve significantly and in discussion with pulmonary colleague patient and family with decision to extubate and transition to comfort goal.  Patient was ultimately extubated and  later that evening. 2.  ESRD on HD - Patient continued on hemodialysis per nephrology throughout hospital stay without issue. 3.  Supraventricular Tachycardia - Cardiology following during admission, in setting of acute illness including pneumothorax and pleural effusion, patient is s/p PPM in . 4.  Anemia of chronic disease - Patient was continued on procrit and no acute bleeding issues. 5.  Hypotension - Patient had persistent hypotension, efforts at titration of vasopressor were ultimately unsuccessful despite due diligence. 7. SIRS in setting of pleural effusion / Possible pneumonia POA- Patient was intermittently hypotensive and tachycardic all cultures had remained negative. Patient never developed leukocytosis or fevers. There was possibility of pneumonia being obscured by large pleural effusion, however despite treatment with vancomycin there was no change in condition thus antibiotic was stopped after 4 day course. 9.  NSTEMI ruled out - patient had mild elevation in troponin in setting of acute illness and extensive pleural effusion + ESRD. Cardiology followed throughout hospital admission. Disposition:     Patient  shortly after compassionate extubation.       Signed:  Shara Britt NP  10/29/2017  9:45 PM

## 2017-11-01 LAB
Lab: NORMAL
REFERENCE LAB,REFLB: NORMAL
TEST DESCRIPTION:,ATST: NORMAL

## 2017-11-27 LAB
BACTERIA SPEC CULT: NORMAL
FUNGUS SMEAR,FNGSMR: NORMAL
SERVICE CMNT-IMP: NORMAL

## 2020-02-13 NOTE — PROGRESS NOTES
Problem: Falls - Risk of  Goal: *Absence of Falls  Document Richard Fall Risk and appropriate interventions in the flowsheet.    Outcome: Progressing Towards Goal  Fall Risk Interventions:              Medication Interventions: Patient to call before getting OOB     Elimination Interventions: Call light in reach, Patient to call for help with toileting needs H

## 2024-01-01 NOTE — PROGRESS NOTES
OV note from 2024:  Right hip click  -US pending. Parents will notify me where they would like this completed ([facility]).    Routed to Dr. Cuadra for order.    RENAL DAILY PROGRESS NOTE      IMPRESSION:   ESRD, TTS schedule, no urgent indication for HD today   Anemia, on epo during HD  Access; left arm fistula,   Hypotension, on low dose haroon  Elevated PTH, on  hectrol during HD   pleural effusion, hydroneumothorax s/p chest tube placement   PLAN:    NO urgent indication for HD today. Adjust all meds per current renal function status.                Subjective:     69y F with ESRD  Complaint:  Overnight events noted  Grandson at bed side, updated about her current condition   Remain intubated, on neosynephrine       Current Facility-Administered Medications   Medication Dose Route Frequency    cefepime (MAXIPIME) 0.5 g in 0.9% sodium chloride 100 mL IVPB  0.5 g IntraVENous Q24H    [START ON 10/28/2017] Vancomycin random level  1 Each Other Rx Dosing/Monitoring    heparin (porcine) injection 5,000 Units  5,000 Units SubCUTAneous Q8H    polyethylene glycol (MIRALAX) packet 17 g  17 g Oral DAILY    0.9% sodium chloride infusion 250 mL  250 mL IntraVENous PRN    epoetin windy (EPOGEN;PROCRIT) injection 2,000 Units  2,000 Units IntraVENous Q TUE, THU & SAT    PHENYLephrine 90,000 mcg in 0.9% sodium chloride 250 ml (NEOSYNEPHRINE) (premix or compounded) infusion   mcg/min IntraVENous TITRATE    fentaNYL citrate (PF) injection 25-50 mcg  25-50 mcg IntraVENous Q4H PRN    chlorhexidine (PERIDEX) 0.12 % mouthwash 10 mL  10 mL Oral BID    glucose chewable tablet 16 g  4 Tab Oral PRN    glucagon (GLUCAGEN) injection 1 mg  1 mg IntraMUSCular PRN    dextrose (D50W) injection syrg 12.5-25 g  25-50 mL IntraVENous PRN    midazolam (VERSED) injection 1-2 mg  1-2 mg IntraVENous Q4H PRN    famotidine (PF) (PEPCID) 20 mg in sodium chloride 0.9 % 10 mL injection  20 mg IntraVENous Q24H    0.9% sodium chloride infusion  100 mL/hr IntraVENous DIALYSIS PRN    doxercalciferol (HECTOROL) 4 mcg/2 mL injection 6 mcg  6 mcg IntraVENous DIALYSIS TUE, THU & SAT    alteplase (CATHFLO) 2 mg in sterile water (preservative free) 2 mL injection  2 mg InterCATHeter ONCE PRN    heparin (porcine) 1,000 unit/mL injection 1,000 Units  1,000 Units InterCATHeter DIALYSIS PRN    VANCOMYCIN INFORMATION NOTE   Other Rx Dosing/Monitoring    diphenhydrAMINE (BENADRYL) injection 12.5 mg  12.5 mg IntraVENous Q4H PRN    ondansetron (ZOFRAN) injection 4 mg  4 mg IntraVENous Q4H PRN    acetaminophen (TYLENOL) suppository 650 mg  650 mg Rectal Q4H PRN    albuterol-ipratropium (DUO-NEB) 2.5 MG-0.5 MG/3 ML  3 mL Nebulization Q6H RT       Review of Symptoms: intubated   Objective:   Patient Vitals for the past 24 hrs:   Temp Pulse Resp BP SpO2   10/27/17 1430 - 76 19 122/66 100 %   10/27/17 1400 - 77 21 116/58 100 %   10/27/17 1330 - 74 18 118/55 100 %   10/27/17 1300 - 75 19 109/58 100 %   10/27/17 1230 - 85 24 135/73 100 %   10/27/17 1224 - 74 17 - 100 %   10/27/17 1200 - 71 21 131/72 100 %   10/27/17 1135 98.8 °F (37.1 °C) - - - -   10/27/17 1130 - 75 17 124/65 100 %   10/27/17 1100 - 73 15 113/62 100 %   10/27/17 1030 - 70 15 130/48 100 %   10/27/17 1019 - 70 18 120/68 100 %   10/27/17 1000 - 75 18 (!) 89/75 -   10/27/17 0930 - 80 15 100/50 -   10/27/17 0900 - 78 19 138/60 100 %   10/27/17 0845 - 85 18 126/82 -   10/27/17 0835 - 86 26 - 97 %   10/27/17 0815 - 73 20 114/56 100 %   10/27/17 0800 - 68 14 123/64 95 %   10/27/17 0742 99.5 °F (37.5 °C) - - - -   10/27/17 0715 - 68 15 106/59 100 %   10/27/17 0700 - 65 (!) 7 110/59 100 %   10/27/17 0645 - 67 (!) 0 118/59 100 %   10/27/17 0630 - 89 14 102/67 -   10/27/17 0600 - 62 14 128/68 100 %   10/27/17 0530 - 76 15 114/50 100 %   10/27/17 0500 - 81 15 117/71 100 %   10/27/17 0430 - 71 15 119/63 100 %   10/27/17 0400 96.7 °F (35.9 °C) 72 16 113/71 100 %   10/27/17 0330 - 68 17 125/74 100 %   10/27/17 0318 - - 15 - -   10/27/17 0315 - 75 18 127/50 100 %   10/27/17 0300 - 70 16 (!) 80/57 100 %   10/27/17 0245 - 76 16 133/46 100 %   10/27/17 0230 - 72 15 102/51 100 %   10/27/17 0200 - 63 15 115/52 100 %   10/27/17 0148 - - - - 100 %   10/27/17 0130 - 98 23 (!) 88/61 100 %   10/27/17 0100 - 65 15 99/58 100 %   10/27/17 0030 - 72 15 92/54 100 %   10/27/17 0009 - 75 15 - 100 %   10/27/17 0000 99.5 °F (37.5 °C) 73 15 102/53 100 %   10/26/17 2330 - 90 24 134/81 100 %   10/26/17 2300 - 76 16 137/62 100 %   10/26/17 2230 - 65 15 121/64 100 %   10/26/17 2200 - 69 20 (!) 85/51 100 %   10/26/17 2130 - 72 16 110/65 100 %   10/26/17 2120 - 64 22 - 100 %   10/26/17 2100 - 63 16 105/63 100 %   10/26/17 2045 - 67 13 101/64 100 %   10/26/17 2044 - 73 19 - 100 %   10/26/17 2030 - 78 23 108/67 100 %   10/26/17 2015 - 68 17 114/74 99 %   10/26/17 2000 98.5 °F (36.9 °C) 77 18 117/49 100 %   10/26/17 1945 98.5 °F (36.9 °C) 78 15 99/72 91 %   10/26/17 1930 97.9 °F (36.6 °C) (!) 109 24 152/61 90 %   10/26/17 1915 98.6 °F (37 °C) 77 22 105/47 100 %   10/26/17 1900 98.5 °F (36.9 °C) 63 19 98/56 100 %   10/26/17 1845 - 71 16 104/71 100 %   10/26/17 1830 - 68 19 (!) 82/40 100 %   10/26/17 1828 - 71 24 - 98 %   10/26/17 1815 - 83 16 99/42 100 %   10/26/17 1800 - 78 15 (!) 82/52 100 %   10/26/17 1745 - 83 19 - 100 %   10/26/17 1730 - 81 18 100/40 100 %   10/26/17 1704 - 69 17 - 100 %   10/26/17 1700 - 77 18 (!) 86/38 100 %   10/26/17 1645 - 80 15 - 100 %   10/26/17 1630 - 84 15 120/48 100 %   10/26/17 1600 - 69 20 128/53 99 %   10/26/17 1545 - 79 16 - 100 %   10/26/17 1530 - 76 14 (!) 81/40 100 %   10/26/17 1523 99.7 °F (37.6 °C) - - - -        Weight change: 0.3 kg (10.6 oz)     10/25 1901 - 10/27 0700  In: 1960.3 [I.V.:135.3]  Out: 800     Intake/Output Summary (Last 24 hours) at 10/27/17 1517  Last data filed at 10/27/17 1442   Gross per 24 hour   Intake             1891 ml   Output             1030 ml   Net              861 ml     Physical Exam:   General: intubated   HEENT  no thyromegaly  CVS: S1S2 heard,  no rub  RS: + air entry b/l,   Abd: Soft, Non tender,   Neuro: non focal, awake  Extrm: ++ edema, no cyanosis, clubbing   Skin: no visible  Rash  Access: left UE fistula, + thrill         Data Review:     LABS:   Hematology: Recent Labs      10/27/17   0131  10/26/17   0200  10/25/17   0241   WBC  8.2  8.4  10.4   HGB  9.7*  8.9*  9.3*   HCT  29.8*  26.8*  27.3*     Chemistry: Recent Labs      10/27/17   0131  10/26/17   0200  10/25/17   0241   BUN  25*  40*  26*   CREA  3.76*  5.17*  4.18*   CA  8.0*  8.6  8.1*   K  3.7  3.3*  3.6   NA  137  137  136   CL  108  107  107   CO2  22  22  20*   PHOS  1.7*  2.2*  1.6*   GLU  89  75  107*              Procedures/imaging: see electronic medical records for all procedures, Xrays and details which were not copied into this note but were reviewed prior to creation of Plan          Assessment & Plan:     As above         Lukas Vaughan MD  10/27/2017  3:17 PM

## (undated) DEVICE — (D)DRSG BORD MPLX SACRUM 23X23 -- DISC BY MFR USE ITEM 340908

## (undated) DEVICE — Device

## (undated) DEVICE — SUTURE PERMA-HAND SZ 3-0 L24IN NONABSORBABLE BLK W/O NDL SA74H

## (undated) DEVICE — PTA BALLOON DILATATION CATHETER: Brand: STERLING™

## (undated) DEVICE — GLOVE SURG SZ 7 L11.33IN FNGR THK9.8MIL STRW LTX POLYMER

## (undated) DEVICE — KIT CLN UP BON SECOURS MARYV

## (undated) DEVICE — GLOVE SURG SZ 7.5 L11.73IN FNGR THK9.8MIL STRW LTX POLYMER

## (undated) DEVICE — INTENDED FOR TISSUE SEPARATION, AND OTHER PROCEDURES THAT REQUIRE A SHARP SURGICAL BLADE TO PUNCTURE OR CUT.: Brand: BARD-PARKER SAFETY BLADES SIZE 11, STERILE

## (undated) DEVICE — 3M(TM) MEDIPORE(TM) +PAD SOFT CLOTH ADHESIVE WOUND DRESSING 3566: Brand: 3M™ MEDIPORE™

## (undated) DEVICE — AIRLIFE™ NASAL OXYGEN CANNULA CURVED, NONFLARED TIP WITH 14 FOOT (4.3 M) CRUSH-RESISTANT TUBING, OVER-THE-EAR STYLE: Brand: AIRLIFE™

## (undated) DEVICE — (D)PREP SKN CHLRAPRP APPL 26ML -- CONVERT TO ITEM 371833

## (undated) DEVICE — DRAPE XR C ARM 41X74IN LF --

## (undated) DEVICE — CATHETER ANGIO BER2 038 4 FRX65 CM TEMPO AQUA

## (undated) DEVICE — DEVICE INFL ENCORE MEDI 26

## (undated) DEVICE — SUTURE MCRYL SZ 4-0 L18IN ABSRB UD L19MM PS-2 3/8 CIR PRIM Y496G

## (undated) DEVICE — SOLUTION IV 1000ML 0.9% SOD CHL

## (undated) DEVICE — 3M(TM) MEDIPORE(TM) +PAD SOFT CLOTH ADHESIVE WOUND DRESSING 3569: Brand: 3M™ MEDIPORE™

## (undated) DEVICE — (D)STRIP SKN CLSR 0.5X4IN WHT --

## (undated) DEVICE — SUTURE PERMA-HAND SZ 2-0 L24IN NONABSORBABLE BLK W/O NDL SA75H

## (undated) DEVICE — (D)SYR 10ML 1/5ML GRAD NSAF -- PKGING CHANGE USE ITEM 338027

## (undated) DEVICE — REM POLYHESIVE ADULT PATIENT RETURN ELECTRODE: Brand: VALLEYLAB

## (undated) DEVICE — NEEDLE ANGIO 1 WALL ULT SMOOTH 19GAX7CM MERIT AVANCE

## (undated) DEVICE — GUIDEWIRE WITH ICE™ HYDROPHILIC COATING: Brand: V-18™ CONTROL WIRE™

## (undated) DEVICE — TABLE COVER: Brand: CONVERTORS